# Patient Record
Sex: MALE | Race: BLACK OR AFRICAN AMERICAN | Employment: OTHER | URBAN - METROPOLITAN AREA
[De-identification: names, ages, dates, MRNs, and addresses within clinical notes are randomized per-mention and may not be internally consistent; named-entity substitution may affect disease eponyms.]

---

## 2017-12-03 ENCOUNTER — HOSPITAL ENCOUNTER (EMERGENCY)
Age: 77
Discharge: HOME | End: 2017-12-03
Attending: EMERGENCY MEDICINE
Payer: MEDICARE

## 2017-12-03 VITALS
HEIGHT: 68 IN | SYSTOLIC BLOOD PRESSURE: 158 MMHG | WEIGHT: 194 LBS | HEART RATE: 78 BPM | BODY MASS INDEX: 29.4 KG/M2 | TEMPERATURE: 98.5 F | OXYGEN SATURATION: 94 % | RESPIRATION RATE: 25 BRPM | DIASTOLIC BLOOD PRESSURE: 74 MMHG

## 2017-12-03 DIAGNOSIS — I10 ESSENTIAL HYPERTENSION: Primary | ICD-10-CM

## 2017-12-03 LAB
ANION GAP BLD CALC-SCNC: 18 MMOL/L (ref 5–15)
ATRIAL RATE: 72 BPM
BUN BLD-MCNC: 12 MG/DL (ref 9–20)
CA-I BLD-MCNC: 1.23 MMOL/L (ref 1.12–1.32)
CALCULATED T AXIS, ECG11: 5 DEGREES
CHLORIDE BLD-SCNC: 104 MMOL/L (ref 98–107)
CO2 BLD-SCNC: 25 MMOL/L (ref 21–32)
CREAT BLD-MCNC: 1.2 MG/DL (ref 0.6–1.3)
DIAGNOSIS, 93000: NORMAL
GLUCOSE BLD-MCNC: 121 MG/DL (ref 65–100)
HCT VFR BLD CALC: 46 % (ref 36.6–50.3)
HGB BLD-MCNC: 15.6 GM/DL (ref 12.1–17)
P-R INTERVAL, ECG05: 216 MS
POTASSIUM BLD-SCNC: 3.9 MMOL/L (ref 3.5–5.1)
Q-T INTERVAL, ECG07: 388 MS
QRS DURATION, ECG06: 82 MS
QTC CALCULATION (BEZET), ECG08: 424 MS
SERVICE CMNT-IMP: ABNORMAL
SODIUM BLD-SCNC: 142 MMOL/L (ref 136–145)
TROPONIN I BLD-MCNC: <0.04 NG/ML (ref 0–0.08)
VENTRICULAR RATE, ECG03: 72 BPM

## 2017-12-03 PROCEDURE — 36415 COLL VENOUS BLD VENIPUNCTURE: CPT | Performed by: EMERGENCY MEDICINE

## 2017-12-03 PROCEDURE — 80047 BASIC METABLC PNL IONIZED CA: CPT

## 2017-12-03 PROCEDURE — 93005 ELECTROCARDIOGRAM TRACING: CPT

## 2017-12-03 PROCEDURE — 84484 ASSAY OF TROPONIN QUANT: CPT

## 2017-12-03 PROCEDURE — 99285 EMERGENCY DEPT VISIT HI MDM: CPT

## 2017-12-03 RX ORDER — ATORVASTATIN CALCIUM 10 MG/1
10 TABLET, FILM COATED ORAL DAILY
COMMUNITY
End: 2018-03-19 | Stop reason: SDUPTHER

## 2017-12-03 RX ORDER — METOPROLOL SUCCINATE 25 MG/1
25 TABLET, EXTENDED RELEASE ORAL DAILY
COMMUNITY
End: 2018-02-26 | Stop reason: DRUGHIGH

## 2017-12-03 RX ORDER — SODIUM CHLORIDE 0.9 % (FLUSH) 0.9 %
5-10 SYRINGE (ML) INJECTION AS NEEDED
Status: DISCONTINUED | OUTPATIENT
Start: 2017-12-03 | End: 2017-12-03 | Stop reason: HOSPADM

## 2017-12-03 RX ORDER — HYDRALAZINE HYDROCHLORIDE 20 MG/ML
10 INJECTION INTRAMUSCULAR; INTRAVENOUS
Status: DISCONTINUED | OUTPATIENT
Start: 2017-12-03 | End: 2017-12-03

## 2017-12-03 RX ORDER — SODIUM CHLORIDE 0.9 % (FLUSH) 0.9 %
5-10 SYRINGE (ML) INJECTION EVERY 8 HOURS
Status: DISCONTINUED | OUTPATIENT
Start: 2017-12-03 | End: 2017-12-03 | Stop reason: HOSPADM

## 2017-12-03 RX ORDER — LISINOPRIL 10 MG/1
10 TABLET ORAL DAILY
COMMUNITY
End: 2018-02-26 | Stop reason: DRUGHIGH

## 2017-12-03 RX ADMIN — Medication 10 ML: at 10:30

## 2017-12-03 NOTE — DISCHARGE INSTRUCTIONS
High Blood Pressure: Care Instructions  Your Care Instructions    If your blood pressure is usually above 140/90, you have high blood pressure, or hypertension. That means the top number is 140 or higher or the bottom number is 90 or higher, or both. Despite what a lot of people think, high blood pressure usually doesn't cause headaches or make you feel dizzy or lightheaded. It usually has no symptoms. But it does increase your risk for heart attack, stroke, and kidney or eye damage. The higher your blood pressure, the more your risk increases. Your doctor will give you a goal for your blood pressure. Your goal will be based on your health and your age. An example of a goal is to keep your blood pressure below 140/90. Lifestyle changes, such as eating healthy and being active, are always important to help lower blood pressure. You might also take medicine to reach your blood pressure goal.  Follow-up care is a key part of your treatment and safety. Be sure to make and go to all appointments, and call your doctor if you are having problems. It's also a good idea to know your test results and keep a list of the medicines you take. How can you care for yourself at home? Medical treatment  · If you stop taking your medicine, your blood pressure will go back up. You may take one or more types of medicine to lower your blood pressure. Be safe with medicines. Take your medicine exactly as prescribed. Call your doctor if you think you are having a problem with your medicine. · Talk to your doctor before you start taking aspirin every day. Aspirin can help certain people lower their risk of a heart attack or stroke. But taking aspirin isn't right for everyone, because it can cause serious bleeding. · See your doctor regularly. You may need to see the doctor more often at first or until your blood pressure comes down.   · If you are taking blood pressure medicine, talk to your doctor before you take decongestants or anti-inflammatory medicine, such as ibuprofen. Some of these medicines can raise blood pressure. · Learn how to check your blood pressure at home. Lifestyle changes  · Stay at a healthy weight. This is especially important if you put on weight around the waist. Losing even 10 pounds can help you lower your blood pressure. · If your doctor recommends it, get more exercise. Walking is a good choice. Bit by bit, increase the amount you walk every day. Try for at least 30 minutes on most days of the week. You also may want to swim, bike, or do other activities. · Avoid or limit alcohol. Talk to your doctor about whether you can drink any alcohol. · Try to limit how much sodium you eat to less than 2,300 milligrams (mg) a day. Your doctor may ask you to try to eat less than 1,500 mg a day. · Eat plenty of fruits (such as bananas and oranges), vegetables, legumes, whole grains, and low-fat dairy products. · Lower the amount of saturated fat in your diet. Saturated fat is found in animal products such as milk, cheese, and meat. Limiting these foods may help you lose weight and also lower your risk for heart disease. · Do not smoke. Smoking increases your risk for heart attack and stroke. If you need help quitting, talk to your doctor about stop-smoking programs and medicines. These can increase your chances of quitting for good. When should you call for help? Call 911 anytime you think you may need emergency care. This may mean having symptoms that suggest that your blood pressure is causing a serious heart or blood vessel problem. Your blood pressure may be over 180/110. ? For example, call 911 if:  ? · You have symptoms of a heart attack. These may include:  ¨ Chest pain or pressure, or a strange feeling in the chest.  ¨ Sweating. ¨ Shortness of breath. ¨ Nausea or vomiting.   ¨ Pain, pressure, or a strange feeling in the back, neck, jaw, or upper belly or in one or both shoulders or arms.  ¨ Lightheadedness or sudden weakness. ¨ A fast or irregular heartbeat. ? · You have symptoms of a stroke. These may include:  ¨ Sudden numbness, tingling, weakness, or loss of movement in your face, arm, or leg, especially on only one side of your body. ¨ Sudden vision changes. ¨ Sudden trouble speaking. ¨ Sudden confusion or trouble understanding simple statements. ¨ Sudden problems with walking or balance. ¨ A sudden, severe headache that is different from past headaches. ? · You have severe back or belly pain. ?Do not wait until your blood pressure comes down on its own. Get help right away. ?Call your doctor now or seek immediate care if:  ? · Your blood pressure is much higher than normal (such as 180/110 or higher), but you don't have symptoms. ? · You think high blood pressure is causing symptoms, such as:  ¨ Severe headache. ¨ Blurry vision. ? Watch closely for changes in your health, and be sure to contact your doctor if:  ? · Your blood pressure measures 140/90 or higher at least 2 times. That means the top number is 140 or higher or the bottom number is 90 or higher, or both. ? · You think you may be having side effects from your blood pressure medicine. ? · Your blood pressure is usually normal, but it goes above normal at least 2 times. Where can you learn more? Go to http://prabhjot-willie.info/. Enter J546 in the search box to learn more about \"High Blood Pressure: Care Instructions. \"  Current as of: September 21, 2016  Content Version: 11.4  © 4832-9606 [employer]. Care instructions adapted under license by [employer] (which disclaims liability or warranty for this information). If you have questions about a medical condition or this instruction, always ask your healthcare professional. [place] [address] any warranty or liability for your use of this information.          We hope that we have addressed all of your medical concerns. The examination and treatment you received in the Emergency Department were for an emergent problem and were not intended as complete care. It is important that you follow up with your healthcare provider(s) for ongoing care. If your symptoms worsen or do not improve as expected, and you are unable to reach your usual health care provider(s), you should return to the Emergency Department. Today's healthcare is undergoing tremendous change, and patient satisfaction surveys are one of the many tools to assess the quality of medical care. You may receive a survey from the [place] regarding your experience in the Emergency Department. I hope that your experience has been completely positive, particularly the medical care that I provided. As such, please participate in the survey; anything less than excellent does not meet my expectations or intentions. [facility] and [facility] participate in nationally recognized quality of care measures. If your blood pressure is greater than 120/80, as reported below, we urge that you seek medical care to address the potential of high blood pressure, commonly known as hypertension. Hypertension can be hereditary or can be caused by certain medical conditions, pain, stress, or \"white coat syndrome. \"       Please make an appointment with your health care provider(s) for follow up of your Emergency Department visit. VITALS:   Patient Vitals for the past 8 hrs:   Temp Pulse Resp BP SpO2   12/03/17 1030 - 78 25 158/74 94 %   12/03/17 1008 - - - (!) 184/109 -   12/03/17 1005 98.5 °F (36.9 °C) 91 16 (!) 191/106 98 %          Thank you for allowing us to provide you with medical care today. We realize that you have many choices for your emergency care needs. Please choose us in the future for any continued health care needs. Regards,           Richardson Pendleton, [address] [facility]: [phone]            Recent Results (from the past 24 hour(s))   EKG, 12 LEAD, INITIAL    Collection Time: 12/03/17 10:23 AM   Result Value Ref Range    Ventricular Rate 72 BPM    Atrial Rate 72 BPM    P-R Interval 216 ms    QRS Duration 82 ms    Q-T Interval 388 ms    QTC Calculation (Bezet) 424 ms    Calculated T Axis 5 degrees    Diagnosis       Sinus rhythm with marked sinus arrhythmia with 1st degree AV block  Nonspecific ST abnormality  Abnormal ECG  No previous ECGs available     POC TROPONIN-I    Collection Time: 12/03/17 10:32 AM   Result Value Ref Range    Troponin-I (POC) <0.04 0.00 - 0.08 ng/mL   POC CHEM8    Collection Time: 12/03/17 10:40 AM   Result Value Ref Range    Calcium, ionized (POC) 1.23 1.12 - 1.32 MMOL/L    Sodium (POC) 142 136 - 145 MMOL/L    Potassium (POC) 3.9 3.5 - 5.1 MMOL/L    Chloride (POC) 104 98 - 107 MMOL/L    CO2 (POC) 25 21 - 32 MMOL/L    Anion gap (POC) 18 (H) 5 - 15 mmol/L    Glucose (POC) 121 (H) 65 - 100 MG/DL    BUN (POC) 12 9 - 20 MG/DL    Creatinine (POC) 1.2 0.6 - 1.3 MG/DL    GFRAA, POC >60 >60 ml/min/1.73m2    GFRNA, POC 59 (L) >60 ml/min/1.73m2    Hemoglobin (POC) 15.6 12.1 - 17.0 GM/DL    Hematocrit (POC) 46 36.6 - 50.3 %    Comment Comment Not Indicated. No results found.

## 2017-12-03 NOTE — ED PROVIDER NOTES
HPI Comments: Patient is a very pleasant 27-year-old black male who presents to the emergency department with a chief complaint of hypertension. He states that he had a difficult time sleeping last night, and checked his blood pressure on numerous occasions and found to be running much higher than it normally does. He states that his baseline blood pressures run in the 438-727 systolic range. His pressures since last night has been in the 180-190 range. He states that he just feels generally \"off\", but denies chest pain, headache, weakness, gait instability, shortness of breath. He states his urine output has been normal. He has no other complaints at this time. He states that he has been under somewhat increased stress due to to his son's hospitalization recently, but other than that he identifies no other recent stressors. He denies feeling anxious. He does currently take 3 antihypertensive medications but normally cuts to have them in half because of feelings of lightheadedness and hypotension in the past.    The history is provided by the patient. Past Medical History:   Diagnosis Date    Diverticulitis     High cholesterol     Hypertension     Irregular heart beat        History reviewed. No pertinent surgical history. History reviewed. No pertinent family history. Social History     Social History    Marital status: N/A     Spouse name: N/A    Number of children: N/A    Years of education: N/A     Occupational History    Not on file. Social History Main Topics    Smoking status: Never Smoker    Smokeless tobacco: Never Used    Alcohol use Yes      Comment: socially    Drug use: No    Sexual activity: Not on file     Other Topics Concern    Not on file     Social History Narrative    No narrative on file         ALLERGIES: Review of patient's allergies indicates no known allergies. Review of Systems   Constitutional: Negative.   Negative for appetite change, fever and unexpected weight change. HENT: Negative. Negative for ear pain, hearing loss, nosebleeds, rhinorrhea, sore throat and trouble swallowing. Respiratory: Negative. Negative for cough, chest tightness and shortness of breath. Cardiovascular: Negative. Negative for chest pain and palpitations. Gastrointestinal: Negative. Negative for abdominal distention, abdominal pain, blood in stool and vomiting. Endocrine: Negative. Genitourinary: Negative for dysuria and hematuria. Musculoskeletal: Negative. Negative for back pain and myalgias. Skin: Negative. Negative for rash. Allergic/Immunologic: Negative. Neurological: Negative. Negative for dizziness, syncope, weakness and numbness. Hematological: Negative. Psychiatric/Behavioral: Positive for sleep disturbance. The patient is nervous/anxious. All other systems reviewed and are negative. Vitals:    12/03/17 1005 12/03/17 1008   BP: (!) 191/106 (!) 184/109   Pulse: 91    Resp: 16    Temp: 98.5 °F (36.9 °C)    SpO2: 98%    Weight: 88 kg (194 lb 0.1 oz)    Height: 5' 8\" (1.727 m)             Physical Exam   Constitutional: He is oriented to person, place, and time. He appears well-developed and well-nourished. No distress. HENT:   Head: Normocephalic and atraumatic. Right Ear: External ear normal.   Left Ear: External ear normal.   Nose: Nose normal.   Mouth/Throat: Oropharynx is clear and moist.   Eyes: Conjunctivae and EOM are normal. Pupils are equal, round, and reactive to light. Neck: Normal range of motion. Neck supple. No JVD present. No thyromegaly present. Cardiovascular: Normal rate, normal heart sounds and intact distal pulses. No murmur heard. Irregular     Pulmonary/Chest: Effort normal and breath sounds normal. No respiratory distress. He has no wheezes. He has no rales. Abdominal: Soft. Bowel sounds are normal. He exhibits no distension. There is no tenderness. Musculoskeletal: Normal range of motion.  He exhibits no edema. Neurological: He is alert and oriented to person, place, and time. No cranial nerve deficit. Skin: Skin is warm and dry. No rash noted. Psychiatric: He has a normal mood and affect. His behavior is normal. Thought content normal.   Mildly anxious     Vitals reviewed. MDM  Number of Diagnoses or Management Options  Diagnosis management comments: Assessment and plan: Essential hypertension. No signs of hypertensive emergency at this time. Increase in pressure may be secondary due to recent stress. Patient was not given antihypertensive meds in the  emergency department due to spontaneous lowering of his pressure down to 150's. He feels well and has no complaints. He was provided with reassurance. I have encouraged him to follow up with his primary care physician later this week for recheck. Amount and/or Complexity of Data Reviewed  Clinical lab tests: ordered and reviewed    Risk of Complications, Morbidity, and/or Mortality  Presenting problems: moderate  Diagnostic procedures: moderate  Management options: moderate    Patient Progress  Patient progress: improved    ED Course       Procedures    EKG interpretation: An EKG was performed at 10:23 AM. Rate is 72. It is a normal sinus rhythm with a first degree AV block and a sinus arrhythmia. ST-T segments are unremarkable, there is LVH with a mild strain pattern. There is no evidence of acute injury.

## 2017-12-03 NOTE — ED TRIAGE NOTES
Pt ambulates to treatment area he states that since yesterday afternoon his BP has been high and he has been feeling his heart beating in his chest.  He denies any chest pain, headache, nausea or dizziness with the high BP. He has taken his medication for the blood pressure but he feels it is not helping him today.

## 2017-12-12 ENCOUNTER — HOSPITAL ENCOUNTER (EMERGENCY)
Age: 77
Discharge: HOME | End: 2017-12-12
Attending: EMERGENCY MEDICINE
Payer: MEDICARE

## 2017-12-12 ENCOUNTER — APPOINTMENT (OUTPATIENT)
Dept: CT IMAGING | Age: 77
End: 2017-12-12
Attending: EMERGENCY MEDICINE
Payer: MEDICARE

## 2017-12-12 VITALS
OXYGEN SATURATION: 96 % | HEART RATE: 79 BPM | SYSTOLIC BLOOD PRESSURE: 181 MMHG | DIASTOLIC BLOOD PRESSURE: 90 MMHG | TEMPERATURE: 97.7 F | RESPIRATION RATE: 18 BRPM | BODY MASS INDEX: 29.63 KG/M2 | WEIGHT: 194.89 LBS

## 2017-12-12 DIAGNOSIS — H69.83 EUSTACHIAN TUBE DYSFUNCTION, BILATERAL: ICD-10-CM

## 2017-12-12 DIAGNOSIS — H93.13 TINNITUS OF BOTH EARS: Primary | ICD-10-CM

## 2017-12-12 PROCEDURE — 70450 CT HEAD/BRAIN W/O DYE: CPT

## 2017-12-12 PROCEDURE — 99282 EMERGENCY DEPT VISIT SF MDM: CPT

## 2017-12-12 NOTE — DISCHARGE INSTRUCTIONS
We hope that we have addressed all of your medical concerns. The examination and treatment you received in the Emergency Department were for an emergent problem and were not intended as complete care. It is important that you follow up with your healthcare provider(s) for ongoing care. If your symptoms worsen or do not improve as expected, and you are unable to reach your usual health care provider(s), you should return to the Emergency Department. Today's healthcare is undergoing tremendous change, and patient satisfaction surveys are one of the many tools to assess the quality of medical care. You may receive a survey from the [employer] regarding your experience in the Emergency Department. I hope that your experience has been completely positive, particularly the medical care that I provided. As such, please participate in the survey; anything less than excellent does not meet my expectations or intentions. [facility] and [address] [place] [address] participate in nationally recognized quality of care measures. If your blood pressure is greater than 120/80, as reported below, we urge that you seek medical care to address the potential of high blood pressure, commonly known as hypertension. Hypertension can be hereditary or can be caused by certain medical conditions, pain, stress, or \"white coat syndrome. \"       Please make an appointment with your health care provider(s) for follow up of your Emergency Department visit. VITALS:   Patient Vitals for the past 8 hrs:   Temp Pulse Resp BP SpO2   12/12/17 0204 - - - 153/70 -   12/12/17 0147 97.7 °F (36.5 °C) 85 18 (!) 218/88 95 %          Thank you for allowing us to provide you with medical care today. We realize that you have many choices for your emergency care needs. Please choose us in the future for any continued health care needs. Fredo Capellan, [address]: [phone]            No results found for this or any previous visit (from the past 24 hour(s)). Ct Head Wo Cont    Result Date: 12/12/2017  EXAM:  CT HEAD WO CONT INDICATION: Frontal headache and bilateral tinnitus since 1:00 AM. COMPARISON: None TECHNIQUE: Noncontrast head CT. Coronal and sagittal reformats. CT dose reduction was achieved through the use of a standardized protocol tailored for this examination and automatic exposure control for dose modulation. FINDINGS: The ventricles and sulci are age-appropriate without hydrocephalus. There is no mass effect or midline shift. There is no intracranial hemorrhage or extra-axial fluid collection. Faint wedge-shaped hypoattenuation in the right cerebellar hemisphere. Focal hypoattenuation in the right thalamus. The calvarium is intact. The visualized paranasal sinuses and mastoid air cells are clear. IMPRESSION: 1. No mass or intracranial hemorrhage. 2. 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 Tinnitus: Care Instructions  Your Care Instructions    Many people have some ringing sounds in their ears once in a while. You may hear a roar, a hiss, a tinkle, or a buzz. The sound usually lasts only a few minutes. If it goes on all the time, you may have tinnitus. Tinnitus is usually caused by long-term exposure to loud noise. This damages the nerves in the inner ear. It can occur with all types of hearing loss. It may be a symptom of almost any ear problem. Tinnitus may be caused by a buildup of earwax. Or it may be caused by ear infections or certain medicines (especially antibiotics or large amounts of aspirin). You can also hear noises in your ears because of an injury to the ears, drinking too much alcohol or caffeine, or a medical condition. You may need tests to evaluate your hearing and to find causes of long-lasting tinnitus.   Your doctor may suggest one or more treatments to help you cope with it. You can also do things at home to help reduce symptoms. Follow-up care is a key part of your treatment and safety. Be sure to make and go to all appointments, and call your doctor if you are having problems. It's also a good idea to know your test results and keep a list of the medicines you take. How can you care for yourself at home? · Limit or cut out alcohol and caffeine. They can make your symptoms worse. · Do not smoke or use other tobacco products. Nicotine reduces blood flow to the ear and makes tinnitus worse. If you need help quitting, talk to your doctor about stop-smoking programs and medicines. These can increase your chances of quitting for good. · Talk to your doctor about whether to stop taking aspirin and similar products such as ibuprofen or naproxen. · Get exercise often. It can improve blood flow to the ear. Ways to cope with noise  Some tinnitus may last a long time. To cope with noise, try to:  · Avoid noises that you think caused your tinnitus. If you can't avoid loud noises, wear earplugs or earmuffs. · Ignore the sound by paying attention to other things. · Relax using biofeedback, meditation, or yoga. Feeling stressed and being tired can make tinnitus worse. · Play music or white noise to help you sleep. Background noise may cover up the noise that you hear in your ears. You can buy a machine that makes soothing sounds, such as ocean waves. When should you call for help? Call 911 anytime you think you may need emergency care. For example, call if:  ? · You have symptoms of a stroke. These may include:  ¨ Sudden numbness, tingling, weakness, or loss of movement in your face, arm, or leg, especially on only one side of your body. ¨ Sudden vision changes. ¨ Sudden trouble speaking. ¨ Sudden confusion or trouble understanding simple statements. ¨ Sudden problems with walking or balance. ¨ A sudden, severe headache that is different from past headaches. ?Call your doctor now or seek immediate medical care if:  ? · You develop other symptoms. These may include hearing loss (or worse hearing loss), balance problems, dizziness, nausea, or vomiting. ? Watch closely for changes in your health, and be sure to contact your doctor if:  ? · Your tinnitus moves from both ears to one ear. ? · Your hearing loss gets worse within 1 day after an ear injury. ? · Your tinnitus or hearing loss does not get better within 1 week after an ear injury. ? · Your tinnitus bothers you enough that you want to take medicines to help you cope with it. Where can you learn more? Go to http://prabhjot-willie.info/. Enter S165 in the search box to learn more about \"Tinnitus: Care Instructions. \"  Current as of: May 12, 2017  Content Version: 11.4  © 1154-8079 [employer]. Care instructions adapted under license by [employer] (which disclaims liability or warranty for this information). If you have questions about a medical condition or this instruction, always ask your healthcare professional. [place] [address] any warranty or liability for your use of this information.

## 2017-12-12 NOTE — ED PROVIDER NOTES
HPI Comments: Jose Nance is a 78 y.o. male who presents via private vehicle to the ED with a c/o ringing in his ears. Pt states that it started about 24 hours ago. Pt denies any pain or lightheadedness. Pt also states that he has had some floaters in both eyes. PCP: Barbi Deleon  PMHx significant for: HTN, hypercholesterolemia, diverticulitis  PSHx significant for: none  Social Hx: Tobacco: denies EtOH: yes Illicit drug use: denies    There are no further complaints or symptoms at this time. Patient is a 78 y.o. male presenting with tinnitus. The history is provided by the patient. No  was used. Ringing in Ear    This is a new problem. The current episode started 12 to 24 hours ago. The problem occurs constantly. The problem has not changed since onset. Patient complains that both ears are affected. There has been no fever. The patient is experiencing no pain. Pertinent negatives include no headaches, no hearing loss, no rhinorrhea, no abdominal pain, no vomiting, no cough and no rash. Past Medical History:   Diagnosis Date    Diverticulitis     High cholesterol     Hypertension     Irregular heart beat        History reviewed. No pertinent surgical history. History reviewed. No pertinent family history. Social History     Social History    Marital status: SINGLE     Spouse name: N/A    Number of children: N/A    Years of education: N/A     Occupational History    Not on file. Social History Main Topics    Smoking status: Never Smoker    Smokeless tobacco: Never Used    Alcohol use Yes      Comment: socially    Drug use: No    Sexual activity: Not on file     Other Topics Concern    Not on file     Social History Narrative         ALLERGIES: Review of patient's allergies indicates no known allergies. Review of Systems   Constitutional: Negative for appetite change, chills, fever and unexpected weight change. HENT: Positive for tinnitus. Negative for ear pain, hearing loss, rhinorrhea and trouble swallowing. Eyes: Negative for pain and visual disturbance. Respiratory: Negative for cough, chest tightness and shortness of breath. Cardiovascular: Negative for chest pain and palpitations. Gastrointestinal: Negative for abdominal distention, abdominal pain, blood in stool and vomiting. Genitourinary: Negative for dysuria, hematuria and urgency. Musculoskeletal: Negative for back pain and myalgias. Skin: Negative for rash. Neurological: Negative for dizziness, syncope, weakness, numbness and headaches. Psychiatric/Behavioral: Negative for confusion and suicidal ideas. All other systems reviewed and are negative. Vitals:    12/12/17 0147 12/12/17 0204   BP: (!) 218/88 153/70   Pulse: 85    Resp: 18    Temp: 97.7 °F (36.5 °C)    SpO2: 95%    Weight: 88.4 kg (194 lb 14.2 oz)             Physical Exam   Constitutional: He is oriented to person, place, and time. He appears well-developed and well-nourished. No distress. HENT:   Head: Normocephalic and atraumatic. Right Ear: Hearing, external ear and ear canal normal. Tympanic membrane is retracted. Left Ear: Hearing, external ear and ear canal normal. Tympanic membrane is retracted. Nose: Nose normal.   Mouth/Throat: Uvula is midline. No uvula swelling. Posterior oropharyngeal erythema present. No oropharyngeal exudate, posterior oropharyngeal edema or tonsillar abscesses. Eyes: Conjunctivae and EOM are normal. Pupils are equal, round, and reactive to light. Right eye exhibits no discharge. Left eye exhibits no discharge. No scleral icterus. Neck: Normal range of motion. Neck supple. No JVD present. No tracheal deviation present. Cardiovascular: Normal rate, regular rhythm, normal heart sounds and intact distal pulses. Exam reveals no gallop and no friction rub. No murmur heard. Pulmonary/Chest: Effort normal and breath sounds normal. No stridor.  No respiratory distress. He has no decreased breath sounds. He has no wheezes. He has no rhonchi. He has no rales. He exhibits no tenderness. Abdominal: Soft. Bowel sounds are normal. He exhibits no distension. There is no tenderness. There is no rebound and no guarding. Musculoskeletal: Normal range of motion. He exhibits no edema or tenderness. Neurological: He is alert and oriented to person, place, and time. He has normal strength and normal reflexes. No cranial nerve deficit or sensory deficit. He exhibits normal muscle tone. Coordination normal. GCS eye subscore is 4. GCS verbal subscore is 5. GCS motor subscore is 6. Skin: Skin is warm and dry. No rash noted. He is not diaphoretic. No erythema. No pallor. Psychiatric: He has a normal mood and affect. His behavior is normal. Judgment and thought content normal.   Nursing note and vitals reviewed. MDM  Number of Diagnoses or Management Options  Eustachian tube dysfunction, bilateral:   Tinnitus of both ears:      Amount and/or Complexity of Data Reviewed  Tests in the radiology section of CPT®: ordered and reviewed    Risk of Complications, Morbidity, and/or Mortality  Presenting problems: moderate  Diagnostic procedures: moderate  Management options: moderate    Patient Progress  Patient progress: stable    ED Course       Procedures  Chief Complaint   Patient presents with   Jerelene [facility] in Ear       The patients presenting problems have been discussed, and they are in agreement with the care plan formulated and outlined with them. I have encouraged them to ask questions as they arise throughout their visit. MEDICATIONS GIVEN:  Medications - No data to display    LABS REVIEWED:  No results found for this or any previous visit (from the past 24 hour(s)).     VITAL SIGNS:  Patient Vitals for the past 12 hrs:   Temp Pulse Resp BP SpO2   12/12/17 0330 - 79 18 181/90 96 %   12/12/17 0204 - - - 153/70 -   12/12/17 0147 97.7 °F (36.5 °C) 85 18 (!) 218/88 95 % RADIOLOGY RESULTS:  The following have been ordered and reviewed:  CT HEAD WO CONT   Final Result        Study Result      EXAM:  CT HEAD WO CONT     INDICATION: Frontal headache and bilateral tinnitus since 1:00 AM.     COMPARISON: None     TECHNIQUE: Noncontrast head CT. Coronal and sagittal reformats. CT dose  reduction was achieved through the use of a standardized protocol tailored for  this examination and automatic exposure control for dose modulation.     FINDINGS: The ventricles and sulci are age-appropriate without hydrocephalus. There is no mass effect or midline shift. There is no intracranial hemorrhage or  extra-axial fluid collection. Faint wedge-shaped hypoattenuation in the right  cerebellar hemisphere. Focal hypoattenuation in the right thalamus.     The calvarium is intact. The visualized paranasal sinuses and mastoid air cells  are clear.     IMPRESSION  IMPRESSION:         1. No mass or intracranial hemorrhage. 2. 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 PROGRESS NOTES:  Pt feels better. Discussed results and plan with patient. Discussed OTC treatments for eustachian tube dysfunction. Patient will be discharged home with PCP and ENT followup. Patient instructed to return to the emergency room for any worsening symptoms or any other concerns. DIAGNOSIS:    1. Tinnitus of both ears    2.  Eustachian tube dysfunction, bilateral        PLAN:  Follow-up Information     Follow up With Details Comments Contact Info    Charleen Liu MD Schedule an appointment as soon as possible for a visit  [address] [facility] [phone]      Drake Morel MD Call in 1 week As needed [address] [place] [address],[address]      [address] [facility] DEPT  If symptoms worsen [facility] 14  [phone]        Discharge Medication List as of 12/12/2017  3:27 AM      CONTINUE these medications which have NOT CHANGED    Details   lisinopril (PRINIVIL, ZESTRIL) 10 mg tablet Take 10 mg by mouth daily. , Historical Med      atorvastatin (LIPITOR) 10 mg tablet Take 10 mg by mouth daily. , Historical Med      metoprolol succinate (TOPROL-XL) 25 mg XL tablet Take 25 mg by mouth daily. , Historical Med               ED COURSE: The patients hospital course has been uncomplicated.

## 2018-01-10 ENCOUNTER — HOSPITAL ENCOUNTER (EMERGENCY)
Age: 78
Discharge: HOME | End: 2018-01-10
Attending: EMERGENCY MEDICINE
Payer: MEDICARE

## 2018-01-10 VITALS
BODY MASS INDEX: 28.79 KG/M2 | HEART RATE: 74 BPM | RESPIRATION RATE: 14 BRPM | SYSTOLIC BLOOD PRESSURE: 188 MMHG | HEIGHT: 68 IN | OXYGEN SATURATION: 100 % | DIASTOLIC BLOOD PRESSURE: 88 MMHG | WEIGHT: 190 LBS | TEMPERATURE: 97.8 F

## 2018-01-10 DIAGNOSIS — H93.13 TINNITUS OF BOTH EARS: Primary | ICD-10-CM

## 2018-01-10 PROCEDURE — 99283 EMERGENCY DEPT VISIT LOW MDM: CPT

## 2018-01-10 RX ORDER — ALPRAZOLAM 0.25 MG/1
0.25 TABLET ORAL
Qty: 12 TAB | Refills: 0 | Status: SHIPPED | OUTPATIENT
Start: 2018-01-10 | End: 2018-06-20 | Stop reason: ALTCHOICE

## 2018-01-10 NOTE — DISCHARGE INSTRUCTIONS
Tinnitus: Care Instructions  Your Care Instructions    Many people have some ringing sounds in their ears once in a while. You may hear a roar, a hiss, a tinkle, or a buzz. The sound usually lasts only a few minutes. If it goes on all the time, you may have tinnitus. Tinnitus is usually caused by long-term exposure to loud noise. This damages the nerves in the inner ear. It can occur with all types of hearing loss. It may be a symptom of almost any ear problem. Tinnitus may be caused by a buildup of earwax. Or it may be caused by ear infections or certain medicines (especially antibiotics or large amounts of aspirin). You can also hear noises in your ears because of an injury to the ears, drinking too much alcohol or caffeine, or a medical condition. You may need tests to evaluate your hearing and to find causes of long-lasting tinnitus. Your doctor may suggest one or more treatments to help you cope with it. You can also do things at home to help reduce symptoms. Follow-up care is a key part of your treatment and safety. Be sure to make and go to all appointments, and call your doctor if you are having problems. It's also a good idea to know your test results and keep a list of the medicines you take. How can you care for yourself at home? · Limit or cut out alcohol and caffeine. They can make your symptoms worse. · Do not smoke or use other tobacco products. Nicotine reduces blood flow to the ear and makes tinnitus worse. If you need help quitting, talk to your doctor about stop-smoking programs and medicines. These can increase your chances of quitting for good. · Talk to your doctor about whether to stop taking aspirin and similar products such as ibuprofen or naproxen. · Get exercise often. It can improve blood flow to the ear. Ways to cope with noise  Some tinnitus may last a long time. To cope with noise, try to:  · Avoid noises that you think caused your tinnitus.  If you can't avoid loud noises, wear earplugs or earmuffs. · Ignore the sound by paying attention to other things. · Relax using biofeedback, meditation, or yoga. Feeling stressed and being tired can make tinnitus worse. · Play music or white noise to help you sleep. Background noise may cover up the noise that you hear in your ears. You can buy a machine that makes soothing sounds, such as ocean waves. When should you call for help? Call 911 anytime you think you may need emergency care. For example, call if:  ? · You have symptoms of a stroke. These may include:  ¨ Sudden numbness, tingling, weakness, or loss of movement in your face, arm, or leg, especially on only one side of your body. ¨ Sudden vision changes. ¨ Sudden trouble speaking. ¨ Sudden confusion or trouble understanding simple statements. ¨ Sudden problems with walking or balance. ¨ A sudden, severe headache that is different from past headaches. ?Call your doctor now or seek immediate medical care if:  ? · You develop other symptoms. These may include hearing loss (or worse hearing loss), balance problems, dizziness, nausea, or vomiting. ? Watch closely for changes in your health, and be sure to contact your doctor if:  ? · Your tinnitus moves from both ears to one ear. ? · Your hearing loss gets worse within 1 day after an ear injury. ? · Your tinnitus or hearing loss does not get better within 1 week after an ear injury. ? · Your tinnitus bothers you enough that you want to take medicines to help you cope with it. Where can you learn more? Go to http://prabhjot-willie.info/. Enter S165 in the search box to learn more about \"Tinnitus: Care Instructions. \"  Current as of: May 12, 2017  Content Version: 11.4  © 2452-4689 [employer]. Care instructions adapted under license by [employer] (which disclaims liability or warranty for this information).  If you have questions about a medical condition or this instruction, always ask your healthcare professional. [place] [address] any warranty or liability for your use of this information. We hope that we have addressed all of your medical concerns. The examination and treatment you received in the Emergency Department were for an emergent problem and were not intended as complete care. It is important that you follow up with your healthcare provider(s) for ongoing care. If your symptoms worsen or do not improve as expected, and you are unable to reach your usual health care provider(s), you should return to the Emergency Department. Today's healthcare is undergoing tremendous change, and patient satisfaction surveys are one of the many tools to assess the quality of medical care. You may receive a survey from the [facility] regarding your experience in the Emergency Department. I hope that your experience has been completely positive, particularly the medical care that I provided. As such, please participate in the survey; anything less than excellent does not meet my expectations or intentions. [facility] and [address] [place] [address] participate in nationally recognized quality of care measures. If your blood pressure is greater than 120/80, as reported below, we urge that you seek medical care to address the potential of high blood pressure, commonly known as hypertension. Hypertension can be hereditary or can be caused by certain medical conditions, pain, stress, or \"white coat syndrome. \"       Please make an appointment with your health care provider(s) for follow up of your Emergency Department visit. VITALS:   Patient Vitals for the past 8 hrs:   Temp Pulse Resp BP SpO2   01/10/18 1014 97.8 °F (36.6 °C) 74 14 188/88 100 %          Thank you for allowing us to provide you with medical care today. We realize that you have many choices for your emergency care needs.   Please choose us in the future for any continued health care needs. Felipa Me Alferd Bumpers, [address]: [phone]            No results found for this or any previous visit (from the past 24 hour(s)). No results found.

## 2018-01-10 NOTE — ED PROVIDER NOTES
Patient is a 78 y.o. male presenting with tinnitus and headaches. Ringing in Ear    Associated symptoms include headaches. Headache       The patient is a 79-year-old who presents to the emergency room with acute onset of one and a half months of ringing in the ear associated with a very mild headache and some stiffness in his upper back. He was seen here previously for this and had a negative CT scan per his history he states that the ringing has gotten much worse in the last few days and he has an appointment to see ENT on January 26 but he states it is difficult for him to sleep due to the ringing in the ears. Past Medical History:   Diagnosis Date    Diverticulitis     High cholesterol     Hypertension     Irregular heart beat        History reviewed. No pertinent surgical history. History reviewed. No pertinent family history. Social History     Social History    Marital status: SINGLE     Spouse name: N/A    Number of children: N/A    Years of education: N/A     Occupational History    Not on file. Social History Main Topics    Smoking status: Never Smoker    Smokeless tobacco: Never Used    Alcohol use Yes      Comment: socially    Drug use: No    Sexual activity: Not on file     Other Topics Concern    Not on file     Social History Narrative         ALLERGIES: Review of patient's allergies indicates no known allergies. Review of Systems   HENT: Positive for tinnitus. Neurological: Positive for headaches. Vitals:    01/10/18 1014   BP: 188/88   Pulse: 74   Resp: 14   Temp: 97.8 °F (36.6 °C)   SpO2: 100%   Weight: 86.2 kg (190 lb)   Height: 5' 8\" (1.727 m)            Physical Exam   Constitutional: He is oriented to person, place, and time. He appears well-developed and well-nourished. HENT:   Head: Normocephalic and atraumatic. Mouth/Throat: Oropharynx is clear and moist. No oropharyngeal exudate. Eyes: Conjunctivae are normal. No scleral icterus.    Neck: Neck supple. No thyromegaly present. Cardiovascular: Normal rate, regular rhythm and normal heart sounds. Exam reveals no gallop and no friction rub. No murmur heard. Pulmonary/Chest: Effort normal and breath sounds normal. No stridor. No respiratory distress. He has no wheezes. He has no rales. Abdominal: Soft. Bowel sounds are normal. There is no tenderness. There is no rebound and no guarding. Musculoskeletal: Normal range of motion. Lymphadenopathy:     He has no cervical adenopathy. Neurological: He is alert and oriented to person, place, and time. Skin: Skin is warm and dry. Psychiatric: He has a normal mood and affect.         [facility]  ED Course       Procedures

## 2018-01-10 NOTE — ED NOTES
The patient was discharged home by Dr. Juan Miguel Sandy in stable condition. The patient is alert and oriented, in no respiratory distress. The patient's diagnosis, condition and treatment were explained. The patient expressed understanding. One prescriptions given. No work/school note given. A discharge plan has been developed. A  was not involved in the process. Aftercare instructions were given. Pt ambulatory out of the ED.

## 2018-01-25 ENCOUNTER — OFFICE VISIT (OUTPATIENT)
Dept: FAMILY MEDICINE CLINIC | Age: 78
End: 2018-01-25

## 2018-01-25 DIAGNOSIS — Z76.89 ENCOUNTER TO ESTABLISH CARE: Primary | ICD-10-CM

## 2018-01-30 ENCOUNTER — OFFICE VISIT (OUTPATIENT)
Dept: FAMILY MEDICINE CLINIC | Age: 78
End: 2018-01-30

## 2018-01-30 VITALS
HEIGHT: 68 IN | DIASTOLIC BLOOD PRESSURE: 90 MMHG | SYSTOLIC BLOOD PRESSURE: 160 MMHG | WEIGHT: 195 LBS | OXYGEN SATURATION: 100 % | HEART RATE: 70 BPM | RESPIRATION RATE: 16 BRPM | TEMPERATURE: 98.2 F | BODY MASS INDEX: 29.55 KG/M2

## 2018-01-30 DIAGNOSIS — F41.9 ANXIETY: ICD-10-CM

## 2018-01-30 DIAGNOSIS — G47.09 OTHER INSOMNIA: ICD-10-CM

## 2018-01-30 DIAGNOSIS — I10 UNCONTROLLED HYPERTENSION: ICD-10-CM

## 2018-01-30 DIAGNOSIS — R53.83 MALAISE AND FATIGUE: ICD-10-CM

## 2018-01-30 DIAGNOSIS — I10 ESSENTIAL HYPERTENSION: ICD-10-CM

## 2018-01-30 DIAGNOSIS — E55.9 VITAMIN D DEFICIENCY: ICD-10-CM

## 2018-01-30 DIAGNOSIS — M25.511 CHRONIC PAIN OF BOTH SHOULDERS: ICD-10-CM

## 2018-01-30 DIAGNOSIS — R53.81 MALAISE AND FATIGUE: ICD-10-CM

## 2018-01-30 DIAGNOSIS — H93.12 TINNITUS OF LEFT EAR: Primary | ICD-10-CM

## 2018-01-30 DIAGNOSIS — M25.512 CHRONIC PAIN OF BOTH SHOULDERS: ICD-10-CM

## 2018-01-30 DIAGNOSIS — I49.9 IRREGULAR HEART RATE: ICD-10-CM

## 2018-01-30 DIAGNOSIS — E78.00 PURE HYPERCHOLESTEROLEMIA: ICD-10-CM

## 2018-01-30 DIAGNOSIS — G89.29 CHRONIC PAIN OF BOTH SHOULDERS: ICD-10-CM

## 2018-01-30 RX ORDER — METOPROLOL SUCCINATE 25 MG/1
25 TABLET, EXTENDED RELEASE ORAL
Refills: 0 | COMMUNITY
Start: 2017-12-27 | End: 2018-02-26 | Stop reason: SDUPTHER

## 2018-01-30 RX ORDER — ALPRAZOLAM 0.25 MG/1
0.25 TABLET ORAL
Refills: 0 | COMMUNITY
Start: 2018-01-10 | End: 2018-01-30 | Stop reason: SDUPTHER

## 2018-01-30 RX ORDER — LISINOPRIL 10 MG/1
10 TABLET ORAL
Refills: 0 | COMMUNITY
Start: 2018-01-16 | End: 2018-02-26 | Stop reason: SDUPTHER

## 2018-01-30 RX ORDER — FLUTICASONE PROPIONATE 50 MCG
2 SPRAY, SUSPENSION (ML) NASAL
Refills: 1 | COMMUNITY
Start: 2018-01-04 | End: 2018-02-26 | Stop reason: ALTCHOICE

## 2018-01-30 RX ORDER — ATORVASTATIN CALCIUM 10 MG/1
10 TABLET, FILM COATED ORAL
COMMUNITY
Start: 2018-01-29 | End: 2018-02-26 | Stop reason: SDUPTHER

## 2018-01-30 RX ORDER — LOSARTAN POTASSIUM 50 MG/1
50 TABLET ORAL DAILY
Qty: 30 TAB | Refills: 5 | Status: SHIPPED | OUTPATIENT
Start: 2018-01-30 | End: 2018-02-07

## 2018-01-30 RX ORDER — ALPRAZOLAM 0.25 MG/1
0.25 TABLET ORAL
Qty: 12 TAB | Refills: 0 | Status: SHIPPED | OUTPATIENT
Start: 2018-01-30 | End: 2018-02-26 | Stop reason: SDUPTHER

## 2018-01-30 NOTE — MR AVS SNAPSHOT
[address] [place], [place] [address] [address] [facility] 
[phone] Patient: Murali Beckwith MRN: [id number] OES:[id number] Visit Information Date & Time Provider Department Dept. Phone Encounter #  
 1/30/2018 10:00 AM Quinten Ochoa [phone] [id number] Upcoming Health Maintenance Date Due DTaP/Tdap/Td series (1 - Tdap) 12/24/1963 ZOSTER VACCINE AGE 60> 10/24/2002 GLAUCOMA SCREENING Q2Y 12/24/2007 Pneumococcal 65+ Low/Medium Risk (1 of 2 - PCV13) 12/24/2007 MEDICARE YEARLY EXAM 12/24/2007 Influenza Age 5 to Adult 8/1/2017 Allergies as of 1/30/2018  Review Complete On: 1/30/2018 By: Maryjane Lay MD  
 No Known Allergies Current Immunizations  Never Reviewed No immunizations on file. Not reviewed this visit You Were Diagnosed With   
  
 Codes Comments Tinnitus of left ear    -  Primary ICD-10-CM: H93.12 
ICD-9-CM: 388.30 Essential hypertension     ICD-10-CM: I10 
ICD-9-CM: 401.9 Pure hypercholesterolemia     ICD-10-CM: E78.00 ICD-9-CM: 272.0 Chronic pain of both shoulders     ICD-10-CM: M25.511, G89.29, M25.512 ICD-9-CM: 719.41, 338.29 Malaise and fatigue     ICD-10-CM: R53.81, R53.83 ICD-9-CM: 780.79 Other insomnia     ICD-10-CM: G47.09 
ICD-9-CM: 780.52 Vitamin D deficiency     ICD-10-CM: E55.9 ICD-9-CM: 268.9 Irregular heart rate     ICD-10-CM: I49.9 ICD-9-CM: 427.9 Uncontrolled hypertension     ICD-10-CM: I10 
ICD-9-CM: 401.9 Anxiety     ICD-10-CM: F41.9 ICD-9-CM: 300.00 Vitals BP Pulse Temp Resp Height(growth percentile) Weight(growth percentile) 160/90 (BP 1 Location: Right arm, BP Patient Position: Sitting) 70 98.2 °F (36.8 °C) (Oral) 16 5' 8\" (1.727 m) 195 lb (88.5 kg) SpO2 BMI Smoking Status 100% 29.65 kg/m2 Never Smoker Vitals History BMI and BSA Data Body Mass Index Body Surface Area  
 29.65 kg/m 2 2.06 m 2 Preferred Pharmacy Pharmacy Name Phone [facility][id number] [place], [address] [phone] Your Updated Medication List  
  
   
This list is accurate as of: 1/30/18 11:52 AM.  Always use your most recent med list.  
  
  
  
  
 ALPRAZolam 0.25 mg tablet Commonly known as:  Meri Marking Take 1 Tab by mouth every eight (8) hours as needed. Max Daily Amount: 0.75 mg.  
  
 atorvastatin 10 mg tablet Commonly known as:  LIPITOR Take 10 mg by mouth nightly. fluticasone 50 mcg/actuation nasal spray Commonly known as:  Angy Dillon 2 Sprays by Both Nostrils route once over twenty-four (24) hours. LIPOFLAVONOID PO Take 1 Tab by mouth once over twenty-four (24) hours. lisinopril 10 mg tablet Commonly known as:  Ara Cage Take 10 mg by mouth once over twenty-four (24) hours. losartan 50 mg tablet Commonly known as:  COZAAR Take 1 Tab by mouth daily for 30 days. metoprolol succinate 25 mg XL tablet Commonly known as:  TOPROL-XL Take 25 mg by mouth once over twenty-four (24) hours. multivitamin, tx-iron-ca-min 9 mg iron-400 mcg Tab tablet Commonly known as:  THERA-M w/ IRON Take 1 Tab by mouth daily. Prescriptions Printed Refills ALPRAZolam (XANAX) 0.25 mg tablet 0 Sig: Take 1 Tab by mouth every eight (8) hours as needed. Max Daily Amount: 0.75 mg. Class: Print Route: Oral  
  
Prescriptions Sent to Pharmacy Refills  
 losartan (COZAAR) 50 mg tablet 5 Sig: Take 1 Tab by mouth daily for 30 days. Class: Normal  
 Pharmacy: [facility]/pharmacy [id number], [address] Ph #: [phone] Route: Oral  
  
We Performed the Following AMB POC EKG ROUTINE W/ 12 LEADS, INTER & REP [74110 CPT(R)] CBC WITH AUTOMATED DIFF [62402 CPT(R)] CRP, HIGH SENSITIVITY [33464 CPT(R)] LIPID PANEL [42961 CPT(R)] METABOLIC PANEL, COMPREHENSIVE [49808 CPT(R)] REFERRAL TO CARDIOLOGY [RXT18 Custom] Comments:  
 77[facility] as [facility] today. He has had a long history of Tinnitus and recnelty over the past month, things have progressed with feeling very anxious, not sleeping well at night and also noted today on exam to have a very irregular heart rate. I note that the patient has seen ENT and had a previous provider and saw that person about 1-month ago. He recalls that when he was in the ER sometime ago, that another provider may have asked about possible A.fib, but he has never had a work up. Today, his EKG is suspicious for A.fib. He has been on metoprolol. I have sent for labs and he may benefit from further cardiology work up. I note that his EKG also seems to indicate a possible old infarct, but this could be because of the A.fib. Hopefully, by the time he sees Dr. Vani Manriquez, his labs will be back. I note that he had been on Lisinopril low dose, but it did not seem to help his BP and I wonder about possible side effects. Changed to Cozaar and he is also on XR Metoprolol. He has never seen a cardiologist.  
 [address] [facility] [place] [[id number] Custom] VITAMIN B12 & FOLATE [28556 CPT(R)] VITAMIN D, 25 HYDROXY [id number] CPT(R)] Referral Information Referral ID Referred By Referred To  
  
 [id number] Beto Carlisle MD   
   [facility] [address] Phone: [phone] Fax: [phone] Visits Status Start Date End Date 1 New Request 1/30/18 1/30/19 If your referral has a status of pending review or denied, additional information will be sent to support the outcome of this decision. Introducing [facility]! [facility] introduces [employer] patient portal. Now you can access parts of your medical record, email your doctor's office, and request medication refills online. 1. In your internet browser, go to [email or web address] 2. Click on the First Time User? Click Here link in the Sign In box. You will see the New Member Sign Up page. 3. Enter your PlaySquare Access Code exactly as it appears below. You will not need to use this code after youve completed the sign-up process. If you do not sign up before the expiration date, you must request a new code. · PlayS[employer] Access Code: [email or web address] Expires: 4/30/2018 11:38 AM 
 
4. Enter the last four digits of your Social Security Number (xxxx) and Date of Birth (mm/dd/yyyy) as indicated and click Submit. You will be taken to the next sign-up page. 5. Create a PoweredAnalyticst ID. This will be your PlaySquare login ID and cannot be changed, so think of one that is secure and easy to remember. 6. Create a PlaySquare password. You can change your password at any time. 7. Enter your Password Reset Question and Answer. This can be used at a later time if you forget your password. 8. Enter your e-mail address. You will receive e-mail notification when new information is available in [address] 9. Click Sign Up. You can now view and download portions of your medical record. 10. Click the Download Summary menu link to download a portable copy of your medical information. If you have questions, please visit the Frequently Asked Questions section of the Play[employer] website. Remember, PlaySquare is NOT to be used for urgent needs. For medical emergencies, dial 911. Now available from your iPhone and Android! Please provide this summary of care documentation to your next provider. Your primary care clinician is listed as [address]. If you have any questions after today's visit, please call [phone].

## 2018-01-30 NOTE — PROGRESS NOTES
Identified pt with two pt identifiers(name and ). Chief Complaint   Patient presents with    New Patient     Establish as NP - transferring from past PCP, Dr. Trena Anderson as this office is closer    Hypertension     BP fluctuates - did not take BP medication today    Ringing in Ear     has been seen by ENT and dx with Tinnitis    Labs     has had coffee with sweetener    Sleep Problem     experiencing trouble sleeping he believes d/t BP        Health Maintenance Due   Topic    DTaP/Tdap/Td series (1 - Tdap)    ZOSTER VACCINE AGE 60>     GLAUCOMA SCREENING Q2Y     Pneumococcal 65+ Low/Medium Risk (1 of 2 - PCV13)    MEDICARE YEARLY EXAM     Influenza Age 5 to Adult        Wt Readings from Last 3 Encounters:   18 195 lb (88.5 kg)     Temp Readings from Last 3 Encounters:   18 98.2 °F (36.8 °C) (Oral)     BP Readings from Last 3 Encounters:   18 160/90     Pulse Readings from Last 3 Encounters:   18 70         Learning Assessment:  :     Learning Assessment 2018   PRIMARY LEARNER Patient   BARRIERS PRIMARY LEARNER VISUAL   CO-LEARNER CAREGIVER No   PRIMARY LANGUAGE ENGLISH   LEARNER PREFERENCE PRIMARY DEMONSTRATION     LISTENING     READING   ANSWERED BY patient   RELATIONSHIP SELF       Depression Screening:  :     PHQ over the last two weeks 2018   Little interest or pleasure in doing things Not at all   Feeling down, depressed or hopeless Not at all   Total Score PHQ 2 0       Fall Risk Assessment:  :     Fall Risk Assessment, last 12 mths 2018   Able to walk? Yes   Fall in past 12 months? No       Abuse Screening:  :     Abuse Screening Questionnaire 2018   Do you ever feel afraid of your partner? N   Are you in a relationship with someone who physically or mentally threatens you? N   Is it safe for you to go home? Y           Coordination of Care Questionnaire:  :     1) Have you been to an emergency room, urgent care clinic since your last visit? Yes, seen in the ER beginning of this month for ringing in ears   Hospitalized since your last visit? no             2) Have you seen or consulted any other health care providers outside of [address] [place] [address] since your last visit? no  (Include any pap smears or colon screenings in this section.)    3) Do you have an Advance Directive on file? no  Are you interested in receiving information about Advance Directives? no    Patient is accompanied by self. Reviewed record in preparation for visit and have obtained necessary documentation. Medication reconciliation up to date and corrected with patient at this time.

## 2018-01-30 NOTE — PROGRESS NOTES
CHIEF COMPLAINT:   Chief Complaint   Patient presents with    New Patient     Establish as NP - transferring from past PCP, Dr. Lázaro Shaw as this office is closer    Hypertension     BP fluctuates - did not take BP medication today    Ringing in Ear     has been seen by ENT and dx with Tinnitis    Labs     has had coffee with sweetener    Sleep Problem     experiencing trouble sleeping he believes d/t BP       HISTORY OF PRESENT ILLNESS:   77M who is new to our practice. He is here to establish care, but also has exp      PAST MEDICAL HISTORY:  Past Medical History:   Diagnosis Date    Hyperlipidemia     Hypertension     Tinnitus          PAST SURGICAL HISTORY:  History reviewed. No pertinent surgical history. MEDICATIONS:  Current Outpatient Prescriptions   Medication Sig Dispense Refill    lisinopril (PRINIVIL, ZESTRIL) 10 mg tablet Take 10 mg by mouth once over twenty-four (24) hours. 0    ALPRAZolam (XANAX) 0.25 mg tablet Take 0.25 mg by mouth every eight (8) hours as needed. 0    fluticasone (FLONASE) 50 mcg/actuation nasal spray 2 Sprays by Both Nostrils route once over twenty-four (24) hours. 1    metoprolol succinate (TOPROL-XL) 25 mg XL tablet Take 25 mg by mouth once over twenty-four (24) hours. 0    atorvastatin (LIPITOR) 10 mg tablet Take 10 mg by mouth nightly.  multivitamin, tx-iron-ca-min (THERA-M W/ IRON) 9 mg iron-400 mcg tab tablet Take 1 Tab by mouth daily.  BIOFLAV,LEMON/VIT BCOMP,C (LIPOFLAVONOID PO) Take 1 Tab by mouth once over twenty-four (24) hours.              ALLERGIES:  No Known Allergies       SOCIAL HISTORY:     Social History    Marital status: SINGLE     Spouse name: N/A    Number of children: 1 son    Years of education: 11th grade     Occupational History    Retired      Social History Main Topics    Smoking status: Never Smoker    Smokeless tobacco: Never Used    Alcohol use No    Drug use: No    Sexual activity: No FAMILY HISTORY:   Family History   Problem Relation Age of Onset    Hypertension Mother     Stroke Father     Diabetes Sister     Hypertension Sister        REVIEW OF SYSTEMS:  Review of Systems - Negative except for documented in the HPI. PHYSICAL EXAMINATION:      LABORATORY DATA:  Pending./ordered      IMPRESSION AND PLAN:     ICD-10-CM ICD-9-CM    1. Tinnitus of left ear H93.12 388.30 REFERRAL TO CARDIOLOGY   2. Essential hypertension I10 401.9 losartan (COZAAR) 50 mg tablet      METABOLIC PANEL, COMPREHENSIVE   3. Pure hypercholesterolemia Y72.46 629.2 METABOLIC PANEL, COMPREHENSIVE      REFERRAL TO CARDIOLOGY   4. Chronic pain of both shoulders M25.511 719.41 LIPID PANEL    G89.29 338.29 CRP, HIGH SENSITIVITY    M25.512     5. Malaise and fatigue R53.81 780.79 THYROID CASCADE PROFILE    R53.83  CBC WITH AUTOMATED DIFF      VITAMIN B12 & FOLATE   6. Other insomnia G47.09 780.52 REFERRAL TO CARDIOLOGY   7. Vitamin D deficiency E55.9 268.9 VITAMIN D, 25 HYDROXY   8. Irregular heart rate I49.9 427.9 AMB POC EKG ROUTINE W/ 12 LEADS, INTER & REP      REFERRAL TO CARDIOLOGY      ALPRAZolam (XANAX) 0.25 mg tablet   9. Uncontrolled hypertension I10 401.9 REFERRAL TO CARDIOLOGY   10. Anxiety F41.9 300.00 ALPRAZolam (XANAX) 0.25 mg tablet     I have discussed the diagnosis with the patient and the intended treatment plan as seen in the above orders. The patient has received an after-visit summary and questions were answered concerning future plans. Asked to return should symptoms worsen or not improve with treatment. Any pending labs and studies will be relayed to patient when they become available. Pt verbalizes understanding of plan of care and denies further questions or concerns at this time. Follow-up Disposition:  Return if symptoms worsen or fail to improve.

## 2018-01-31 LAB
25(OH)D3+25(OH)D2 SERPL-MCNC: 46.6 NG/ML (ref 30–100)
ALBUMIN SERPL-MCNC: 4.3 G/DL (ref 3.5–4.8)
ALBUMIN/GLOB SERPL: 1.5 {RATIO} (ref 1.2–2.2)
ALP SERPL-CCNC: 70 IU/L (ref 39–117)
ALT SERPL-CCNC: 37 IU/L (ref 0–44)
AST SERPL-CCNC: 32 IU/L (ref 0–40)
BASOPHILS # BLD AUTO: 0 X10E3/UL (ref 0–0.2)
BASOPHILS NFR BLD AUTO: 1 %
BILIRUB SERPL-MCNC: 0.7 MG/DL (ref 0–1.2)
BUN SERPL-MCNC: 8 MG/DL (ref 8–27)
BUN/CREAT SERPL: 8 (ref 10–24)
CALCIUM SERPL-MCNC: 9.3 MG/DL (ref 8.6–10.2)
CHLORIDE SERPL-SCNC: 100 MMOL/L (ref 96–106)
CHOLEST SERPL-MCNC: 135 MG/DL (ref 100–199)
CO2 SERPL-SCNC: 25 MMOL/L (ref 18–29)
CREAT SERPL-MCNC: 1.05 MG/DL (ref 0.76–1.27)
CRP SERPL HS-MCNC: 4.19 MG/L (ref 0–3)
EOSINOPHIL # BLD AUTO: 0.2 X10E3/UL (ref 0–0.4)
EOSINOPHIL NFR BLD AUTO: 6 %
ERYTHROCYTE [DISTWIDTH] IN BLOOD BY AUTOMATED COUNT: 12.8 % (ref 12.3–15.4)
FOLATE SERPL-MCNC: >20 NG/ML
GFR SERPLBLD CREATININE-BSD FMLA CKD-EPI: 69 ML/MIN/1.73
GFR SERPLBLD CREATININE-BSD FMLA CKD-EPI: 80 ML/MIN/1.73
GLOBULIN SER CALC-MCNC: 2.8 G/DL (ref 1.5–4.5)
GLUCOSE SERPL-MCNC: 95 MG/DL (ref 65–99)
HCT VFR BLD AUTO: 43.1 % (ref 37.5–51)
HDLC SERPL-MCNC: 41 MG/DL
HGB BLD-MCNC: 14.4 G/DL (ref 13–17.7)
IMM GRANULOCYTES # BLD: 0 X10E3/UL (ref 0–0.1)
IMM GRANULOCYTES NFR BLD: 1 %
INTERPRETATION, 910389: NORMAL
LDLC SERPL CALC-MCNC: 68 MG/DL (ref 0–99)
LYMPHOCYTES # BLD AUTO: 1 X10E3/UL (ref 0.7–3.1)
LYMPHOCYTES NFR BLD AUTO: 34 %
MCH RBC QN AUTO: 31.2 PG (ref 26.6–33)
MCHC RBC AUTO-ENTMCNC: 33.4 G/DL (ref 31.5–35.7)
MCV RBC AUTO: 94 FL (ref 79–97)
MONOCYTES # BLD AUTO: 0.3 X10E3/UL (ref 0.1–0.9)
MONOCYTES NFR BLD AUTO: 11 %
NEUTROPHILS # BLD AUTO: 1.4 X10E3/UL (ref 1.4–7)
NEUTROPHILS NFR BLD AUTO: 47 %
PLATELET # BLD AUTO: 169 X10E3/UL (ref 150–379)
POTASSIUM SERPL-SCNC: 4.4 MMOL/L (ref 3.5–5.2)
PROT SERPL-MCNC: 7.1 G/DL (ref 6–8.5)
RBC # BLD AUTO: 4.61 X10E6/UL (ref 4.14–5.8)
SODIUM SERPL-SCNC: 143 MMOL/L (ref 134–144)
TRIGL SERPL-MCNC: 128 MG/DL (ref 0–149)
TSH SERPL DL<=0.005 MIU/L-ACNC: 2.32 UIU/ML (ref 0.45–4.5)
VIT B12 SERPL-MCNC: 890 PG/ML (ref 232–1245)
VLDLC SERPL CALC-MCNC: 26 MG/DL (ref 5–40)
WBC # BLD AUTO: 3 X10E3/UL (ref 3.4–10.8)

## 2018-02-01 NOTE — PROGRESS NOTES
Pt was advised and verbalized understanding. He is scheduled to see the cardiologist on 02/15/18 and scheduled to f/u with you on 02/19/2018.

## 2018-02-01 NOTE — PROGRESS NOTES
Labs looked great. Few things to discuss when he comes back. Believe that his symptoms are coming from his heart and hopefully, he will see cardiology soon. Return to see me in 2-weeks.

## 2018-02-06 ENCOUNTER — TELEPHONE (OUTPATIENT)
Dept: FAMILY MEDICINE CLINIC | Age: 78
End: 2018-02-06

## 2018-02-06 NOTE — TELEPHONE ENCOUNTER
Pt was started on new blood pressure medication about a week ago and pt is stating he is having side effects. Pt experiences nervousness, fast heart rate. Pt last took medication on 1/31/18 and stated he stopped taking that new medication and went back to taking the lisinopril 2/2/18.  Please call pt and advise

## 2018-02-06 NOTE — TELEPHONE ENCOUNTER
Returned call to pt and he reports he did not like the s/e of losartan and has resumed taking lisinopril effective 01/31/2018. Upon review of his chart he was prescribed the losartan on 01/30/2018. He denies all sx of HBP today.  He could not come to the office today and has scheduled a f/u appt to discuss with Dr. Austyn Gallego tomorrow at 330 pm.

## 2018-02-07 ENCOUNTER — OFFICE VISIT (OUTPATIENT)
Dept: FAMILY MEDICINE CLINIC | Age: 78
End: 2018-02-07

## 2018-02-07 VITALS
HEART RATE: 71 BPM | DIASTOLIC BLOOD PRESSURE: 70 MMHG | RESPIRATION RATE: 16 BRPM | BODY MASS INDEX: 29.55 KG/M2 | OXYGEN SATURATION: 97 % | SYSTOLIC BLOOD PRESSURE: 149 MMHG | WEIGHT: 195 LBS | TEMPERATURE: 98.3 F | HEIGHT: 68 IN

## 2018-02-07 DIAGNOSIS — M62.838 MUSCLE SPASM OF LEFT SHOULDER: Primary | ICD-10-CM

## 2018-02-07 RX ORDER — TIZANIDINE 2 MG/1
TABLET ORAL
Qty: 15 TAB | Refills: 0 | Status: SHIPPED | OUTPATIENT
Start: 2018-02-07 | End: 2018-02-26 | Stop reason: SINTOL

## 2018-02-07 NOTE — PROGRESS NOTES
CC:  Chief Complaint   Patient presents with    Hypertension      he did not like the s/e of losartan and has resumed taking lisinopril effective 01/31/2018 - reports heart palpatations and nervousness     HISTORY OF PRESENT ILLNESS  Fern Lopes is a 78 y.o. male. HPI Comments: 80[facility] This is his second visit. He had been concerned about Lisinopril and possibility that it was causing symptoms with his BP and tinnitus. We switched it to Lorsartan, but he felt like his heart was very fast and he was nervous. He stopped this and went back to the Lisinopril and symptoms subsided. He is still in the process of being seen by Cardiology and hopefully will have an appointment soon. The hold up has been [employer], his insurance, who did not want to approve him seeing a cardiologist.     Now he is c/o pain in the upper L-shoulder blade that has been on and off for months. Feels like the muscle is spasming. ROS:  Review of Systems   All other systems reviewed and are negative. OBJECTIVE:  Visit Vitals    /70    Pulse 71    Temp 98.3 °F (36.8 °C) (Oral)    Resp 16    Ht 5' 8\" (1.727 m)    Wt 195 lb (88.5 kg)    SpO2 97%    BMI 29.65 kg/m2   Physical Exam   Constitutional:   Anxious. NAD. Cardiovascular: Normal rate and regular rhythm. Pulmonary/Chest: Effort normal and breath sounds normal.   Musculoskeletal: Normal range of motion. Neurological: He is alert. Skin: Skin is warm. Nursing note and vitals reviewed. ASSESSMENT and PLAN    ICD-10-CM ICD-9-CM    1. Muscle spasm of left shoulder M62.838 728.85 tiZANidine (ZANAFLEX) 2 mg tablet     I have discussed the diagnosis with the patient and the intended treatment plan as seen in the above orders. The patient has received an after-visit summary and questions were answered concerning future plans. Asked to return should symptoms worsen or not improve with treatment.  Any pending labs and studies will be relayed to patient when they become available. Pt verbalizes understanding of plan of care and denies further questions or concerns at this time. Follow-up Disposition:  Return if symptoms worsen or fail to improve.

## 2018-02-07 NOTE — PATIENT INSTRUCTIONS
Muscle Cramps: Care Instructions  Your Care Instructions    A muscle cramp occurs when a muscle tightens up suddenly. A cramp often happens in the legs. A muscle cramp is also called a muscle spasm or a charley horse. Muscle cramps usually last less than a minute. However, the pain may last for several minutes. Leg cramps that occur at night may wake you up. Heavy exercise, dehydration, and being overweight can increase your risk of getting cramps. An imbalance of certain chemicals in your blood, called electrolytes, can also lead to muscle cramps. Pregnant women sometimes get muscle cramps during sleep. Muscle cramps can be treated by stretching and massaging the muscle. If cramps keep coming back, your doctor may prescribe medicine that relaxes your muscles. Follow-up care is a key part of your treatment and safety. Be sure to make and go to all appointments, and call your doctor if you are having problems. It's also a good idea to know your test results and keep a list of the medicines you take. How can you care for yourself at home? · Drink plenty of fluids to prevent dehydration. Choose water and other caffeine-free clear liquids until you feel better. If you have kidney, heart, or liver disease and have to limit fluids, talk with your doctor before you increase the amount of fluids you drink. · Stretch your muscles every day, especially before and after exercise and at bedtime. Regular stretching can relax your muscles and may prevent cramps. · Do not suddenly increase the amount of exercise you get. Increase your exercise a little each week. · When you get a cramp, stretch and massage the muscle. You can also take a warm shower or bath to relax the muscle. A heating pad placed on the muscle can also help. · Take a daily multivitamin supplement. · Ask your doctor if you can take an over-the-counter pain medicine, such as acetaminophen (Tylenol), ibuprofen (Advil, Motrin), or naproxen (Aleve). Be safe with medicines. Read and follow all instructions on the label. When should you call for help? Watch closely for changes in your health, and be sure to contact your doctor if:  ? · You get muscle cramps often that do not go away after home treatment. ? · Your muscle cramps often wake you up at night. ? · You do not get better as expected. Where can you learn more? Go to http://prabhjot-willie.info/. Enter W679 in the search box to learn more about \"Muscle Cramps: Care Instructions. \"  Current as of: March 21, 2017  Content Version: 11.4  © 0024-5815 [employer]. Care instructions adapted under license by [employer] (which disclaims liability or warranty for this information). If you have questions about a medical condition or this instruction, always ask your healthcare professional. [address] any warranty or liability for your use of this information.

## 2018-02-07 NOTE — PROGRESS NOTES
Identified pt with two pt identifiers(name and ). Chief Complaint   Patient presents with    Hypertension      he did not like the s/e of losartan and has resumed taking lisinopril effective 2018 - reports heart palpatations and nervousness        Health Maintenance Due   Topic    DTaP/Tdap/Td series (1 - Tdap)    ZOSTER VACCINE AGE 60>     GLAUCOMA SCREENING Q2Y     Pneumococcal 65+ Low/Medium Risk (1 of 2 - PCV13)    MEDICARE YEARLY EXAM     Influenza Age 5 to Adult        Wt Readings from Last 3 Encounters:   18 195 lb (88.5 kg)   18 195 lb (88.5 kg)     Temp Readings from Last 3 Encounters:   18 98.3 °F (36.8 °C) (Oral)   18 98.2 °F (36.8 °C) (Oral)     BP Readings from Last 3 Encounters:   18 149/70   18 160/90     Pulse Readings from Last 3 Encounters:   18 71   18 70         Learning Assessment:  :     Learning Assessment 2018   PRIMARY LEARNER Patient   BARRIERS PRIMARY LEARNER VISUAL   CO-LEARNER CAREGIVER No   PRIMARY LANGUAGE ENGLISH   LEARNER PREFERENCE PRIMARY DEMONSTRATION     LISTENING     READING   ANSWERED BY patient   RELATIONSHIP SELF       Depression Screening:  :     PHQ over the last two weeks 2018   Little interest or pleasure in doing things Not at all   Feeling down, depressed or hopeless Not at all   Total Score PHQ 2 0       Fall Risk Assessment:  :     Fall Risk Assessment, last 12 mths 2018   Able to walk? Yes   Fall in past 12 months? No       Abuse Screening:  :     Abuse Screening Questionnaire 2018   Do you ever feel afraid of your partner? N   Are you in a relationship with someone who physically or mentally threatens you? N   Is it safe for you to go home?  Y       Coordination of Care Questionnaire:  :     1) Have you been to an emergency room, urgent care clinic since your last visit? no   Hospitalized since your last visit? no             2) Have you seen or consulted any other health care providers outside of [address] [place] [address] since your last visit? no  (Include any pap smears or colon screenings in this section.)    3) Do you have an Advance Directive on file? no  Are you interested in receiving information about Advance Directives? no    Patient is accompanied by self. Reviewed record in preparation for visit and have obtained necessary documentation. Medication reconciliation up to date and corrected with patient at this time.

## 2018-02-07 NOTE — MR AVS SNAPSHOT
[address] [facility] 
 
 
 [address] [facility] 
[phone] Patient: Raul Peterson MRN: [id number] [facility]:93/66/9232 Visit Information Date & Time Provider Department Dept. Phone Encounter #  
 2/7/2018  3:30 PM Florin Hinds Quinten Alfonso [phone] [id number] Your Appointments 2/15/2018 11:00 AM  
New Patient with Tree Kumar MD  
[address] ([facility]) Appt Note: Pt ref by PCP Dr. Quyen Padilla DX: irregular heartbeat SK  
 [address] [facility] [address] [facility] [address]  
[phone]  
  
   
 [address] [facility] [address] [facility] [address]  
  
    
 2/19/2018 10:15 AM  
ACUTE CARE with Florin Hinds MD  
[address] [facility]) Appt Note: follow up cardiology appt/recent labs [address] [facility] 860 1067 [facility]  
  
   
 [address] Upcoming Health Maintenance Date Due DTaP/Tdap/Td series (1 - Tdap) 12/24/1963 ZOSTER VACCINE AGE 60> 10/24/2002 GLAUCOMA SCREENING Q2Y 12/24/2007 Pneumococcal 65+ Low/Medium Risk (1 of 2 - PCV13) 12/24/2007 MEDICARE YEARLY EXAM 12/24/2007 Influenza Age 5 to Adult 8/1/2017 Allergies as of 2/7/2018  Review Complete On: 2/7/2018 By: Robb Dyson LPN Severity Noted Reaction Type Reactions Losartan  02/07/2018    Other (comments) Shaking, feeling unbalanced. Current Immunizations  Never Reviewed No immunizations on file. Not reviewed this visit You Were Diagnosed With   
  
 Codes Comments Muscle spasm of left shoulder    -  Primary ICD-10-CM: B08.202 ICD-9-CM: 728.85 Vitals BP Pulse Temp Resp Height(growth percentile) Weight(growth percentile) 149/70 71 98.3 °F (36.8 °C) (Oral) 16 5' 8\" (1.727 m) 195 lb (88.5 kg) SpO2 BMI Smoking Status 97% 29.65 kg/m2 Never Smoker Vitals History BMI and BSA Data Body Mass Index Body Surface Area  
 29.65 kg/m 2 2.06 m 2 Preferred Pharmacy Pharmacy Name Phone [facility] #[id number] [address] [phone] Your Updated Medication List  
  
   
This list is accurate as of: 2/7/18  4:26 PM.  Always use your most recent med list.  
  
  
  
  
 ALPRAZolam 0.25 mg tablet Commonly known as:  Carolyn Snuffer Take 1 Tab by mouth every eight (8) hours as needed. Max Daily Amount: 0.75 mg.  
  
 atorvastatin 10 mg tablet Commonly known as:  LIPITOR Take 10 mg by mouth nightly. fluticasone 50 mcg/actuation nasal spray Commonly known as:  Annie Paget 2 Sprays by Both Nostrils route once over twenty-four (24) hours. LIPOFLAVONOID PO Take 1 Tab by mouth once over twenty-four (24) hours. lisinopril 10 mg tablet Commonly known as:  [place] Art Take 10 mg by mouth once over twenty-four (24) hours. metoprolol succinate 25 mg XL tablet Commonly known as:  TOPROL-XL Take 25 mg by mouth once over twenty-four (24) hours. multivitamin, tx-iron-ca-min 9 mg iron-400 mcg Tab tablet Commonly known as:  THERA-M w/ IRON Take 1 Tab by mouth daily. tiZANidine 2 mg tablet Commonly known as:  Lazaro Barkley Take 1 tablet 1-2 hours prior to bedtime. Prescriptions Sent to Pharmacy Refills  
 tiZANidine (ZANAFLEX) 2 mg tablet 0 Sig: Take 1 tablet 1-2 hours prior to bedtime. Class: Normal  
 Pharmacy: [facility]/pharmacy [address], [address] [facility] Ph #: [phone] Patient Instructions Muscle Cramps: Care Instructions Your Care Instructions A muscle cramp occurs when a muscle tightens up suddenly. A cramp often happens in the legs. A muscle cramp is also called a muscle spasm or a charley horse. Muscle cramps usually last less than a minute.  However, the pain may last for several minutes. Leg cramps that occur at night may wake you up. Heavy exercise, dehydration, and being overweight can increase your risk of getting cramps. An imbalance of certain chemicals in your blood, called electrolytes, can also lead to muscle cramps. Pregnant women sometimes get muscle cramps during sleep. Muscle cramps can be treated by stretching and massaging the muscle. If cramps keep coming back, your doctor may prescribe medicine that relaxes your muscles. Follow-up care is a key part of your treatment and safety. Be sure to make and go to all appointments, and call your doctor if you are having problems. It's also a good idea to know your test results and keep a list of the medicines you take. How can you care for yourself at home? · Drink plenty of fluids to prevent dehydration. Choose water and other caffeine-free clear liquids until you feel better. If you have kidney, heart, or liver disease and have to limit fluids, talk with your doctor before you increase the amount of fluids you drink. · Stretch your muscles every day, especially before and after exercise and at bedtime. Regular stretching can relax your muscles and may prevent cramps. · Do not suddenly increase the amount of exercise you get. Increase your exercise a little each week. · When you get a cramp, stretch and massage the muscle. You can also take a warm shower or bath to relax the muscle. A heating pad placed on the muscle can also help. · Take a daily multivitamin supplement. · Ask your doctor if you can take an over-the-counter pain medicine, such as acetaminophen (Tylenol), ibuprofen (Advil, Motrin), or naproxen (Aleve). Be safe with medicines. Read and follow all instructions on the label. When should you call for help? Watch closely for changes in your health, and be sure to contact your doctor if: 
? · You get muscle cramps often that do not go away after home treatment. ? · Your muscle cramps often wake you up at night. ? · You do not get better as expected. Where can you learn more? Go to http://prabhjot-willie.info/. Enter P117 in the search box to learn more about \"Muscle Cramps: Care Instructions. \" Current as of: March 21, 2017 Content Version: 11.4 © 0227-4763 PredictAd. Care instructions adapted under license by [employer]ist [employer] Shout (which disclaims liability or warranty for this information). If you have questions about a medical condition or this instruction, always ask your healthcare professional. [address] any warranty or liability for your use of this information. Introducing [facility] & HEALTH SERVICES! Eris Calixto introduces AudioTag patient portal. Now you can access parts of your medical record, email your doctor's office, and request medication refills online. 1. In your internet browser, go to [email or web address] 2. Click on the First Time User? Click Here link in the Sign In box. You will see the New Member Sign Up page. 3. Enter your AudioTag Access Code exactly as it appears below. You will not need to use this code after youve completed the sign-up process. If you do not sign up before the expiration date, you must request a new code. · AudioTag Access Code: [email or web address] Expires: 4/30/2018 11:38 AM 
 
4. Enter the last four digits of your Social Security Number (xxxx) and Date of Birth (mm/dd/yyyy) as indicated and click Submit. You will be taken to the next sign-up page. 5. Create a [email or web address] ID. This will be your AudioTag login ID and cannot be changed, so think of one that is secure and easy to remember. 6. Create a AudioTag password. You can change your password at any time. 7. Enter your Password Reset Question and Answer. This can be used at a later time if you forget your password. 8. Enter your e-mail address.  You will receive e-mail notification when new information is available in Postling. 9. Click Sign Up. You can now view and download portions of your medical record. 10. Click the Download Summary menu link to download a portable copy of your medical information. If you have questions, please visit the Frequently Asked Questions section of the Postling website. Remember, Postling is NOT to be used for urgent needs. For medical emergencies, dial 911. Now available from your iPhone and Android! Please provide this summary of care documentation to your next provider. Your primary care clinician is listed as [address]. If you have any questions after today's visit, please call [phone].

## 2018-02-09 ENCOUNTER — OFFICE VISIT (OUTPATIENT)
Dept: CARDIOLOGY CLINIC | Age: 78
End: 2018-02-09

## 2018-02-09 ENCOUNTER — TELEPHONE (OUTPATIENT)
Dept: FAMILY MEDICINE CLINIC | Age: 78
End: 2018-02-09

## 2018-02-09 VITALS
HEIGHT: 68 IN | HEART RATE: 97 BPM | DIASTOLIC BLOOD PRESSURE: 94 MMHG | SYSTOLIC BLOOD PRESSURE: 182 MMHG | WEIGHT: 192.2 LBS | RESPIRATION RATE: 18 BRPM | OXYGEN SATURATION: 95 % | BODY MASS INDEX: 29.13 KG/M2

## 2018-02-09 DIAGNOSIS — I10 ESSENTIAL HYPERTENSION: ICD-10-CM

## 2018-02-09 DIAGNOSIS — R94.31 ABNORMAL EKG: ICD-10-CM

## 2018-02-09 DIAGNOSIS — I49.9 IRREGULAR HEART BEAT: Primary | ICD-10-CM

## 2018-02-09 DIAGNOSIS — H93.12 TINNITUS OF LEFT EAR: ICD-10-CM

## 2018-02-09 DIAGNOSIS — E78.00 PURE HYPERCHOLESTEROLEMIA: ICD-10-CM

## 2018-02-09 RX ORDER — ASPIRIN 81 MG/1
TABLET ORAL DAILY
COMMUNITY
End: 2019-03-21

## 2018-02-09 NOTE — TELEPHONE ENCOUNTER
----- Message from Rni Pérez sent at 2/9/2018 12:07 PM EST -----  Regarding: Dr. Nik Joshua with [facility] through [place] is requesting supporting documentation of the pt's cardiac arrhythmia and hypertension in order to provide approval to see the cardiovascular specialist Dr. Marvin Jones referred pt to. Please fax documentation to [phone], please reference authorization #[id number]. Best contact number [phone].

## 2018-02-09 NOTE — PROGRESS NOTES
Reason for Consult: Irregular heart beat    Referring: Mary Vences MD    HPI: Kamran Gerardo is a 78 y.o. male with past medical history significant for hypertension, dyslipidemia and tinnitus is here for evaluation of irregular heartbeat. This was found on routine examination by Dr. Ramy Tsai. He was asked to see me for follow-up. He denies symptoms of chest pain, palpitations, lightheadedness or dizziness. He has no previous history of cardiac arrhythmias or CAD. He does not recall having previous cardiac evaluation. His blood pressure in the office today was elevated however he conveys that he checks his blood pressure every day in fact he has checked his blood pressure this morning and it was 130/78. His blood pressure usually is less than 706 systolic. EKG today demonstrated normal sinus rhythm with occasional PAC. Normal ST segment. Normal axis. Plan:    1. Irregular heartbeat: EKG today demonstrated occasional PAC however need to rule out if he has any atrial arrhythmias. We will do a 24-hour Holter monitor. Check an echocardiogram for structural heart disease. 2.  Hypertension: Blood pressure elevated in the office today however his own account conveys that he takes blood pressure readings at home and has been normal and less than 614 systolic. Continue to monitor this at home. If he has any significant high blood pressure then we will need to consider adding chlorthalidone. 3.  Tinnitus: He has seen ENT and evaluation was normal.  Will do a bilateral carotid ultrasound to rule out if he has any carotid disease. 4.  Phone follow-up of the test results. Past Medical History:   Diagnosis Date    Hyperlipidemia     Hypertension     Tinnitus             History reviewed. No pertinent surgical history.           Family History   Problem Relation Age of Onset    Hypertension Mother     Stroke Father     Diabetes Sister     Hypertension Sister            Social History Social History    Marital status: SINGLE     Spouse name: N/A    Number of children: N/A    Years of education: N/A     Occupational History    Not on file. Social History Main Topics    Smoking status: Never Smoker    Smokeless tobacco: Never Used    Alcohol use 1.8 - 2.4 oz/week     3 - 4 Standard drinks or equivalent per week    Drug use: No    Sexual activity: No     Other Topics Concern    Not on file     Social History Narrative         Allergies   Allergen Reactions    Losartan Other (comments)     Shaking, feeling unbalanced. Current Outpatient Prescriptions   Medication Sig Dispense Refill    aspirin delayed-release 81 mg tablet Take  by mouth daily.  tiZANidine (ZANAFLEX) 2 mg tablet Take 1 tablet 1-2 hours prior to bedtime. 15 Tab 0    lisinopril (PRINIVIL, ZESTRIL) 10 mg tablet Take 10 mg by mouth once over twenty-four (24) hours. 0    atorvastatin (LIPITOR) 10 mg tablet Take 10 mg by mouth nightly.  multivitamin, tx-iron-ca-min (THERA-M W/ IRON) 9 mg iron-400 mcg tab tablet Take 1 Tab by mouth daily.  BIOFLAV,LEMON/VIT BCOMP,C (LIPOFLAVONOID PO) Take 1 Tab by mouth once over twenty-four (24) hours.  ALPRAZolam (XANAX) 0.25 mg tablet Take 1 Tab by mouth every eight (8) hours as needed. Max Daily Amount: 0.75 mg. 12 Tab 0    fluticasone (FLONASE) 50 mcg/actuation nasal spray 2 Sprays by Both Nostrils route once over twenty-four (24) hours. 1    metoprolol succinate (TOPROL-XL) 25 mg XL tablet Take 25 mg by mouth once over twenty-four (24) hours. 0        ROS:  12 point review of systems was performed.  All negative except for HPI     Physical Exam:  Visit Vitals    BP (!) 182/94 (BP 1 Location: Left arm, BP Patient Position: Sitting)    Pulse 97    Resp 18    Ht 5' 8\" (1.727 m)    Wt 192 lb 3.2 oz (87.2 kg)    SpO2 95%    BMI 29.22 kg/m2       Gen:  Well-developed, well-nourished, in no acute distress  HEENT:  Pink conjunctivae, PERRL, hearing intact to voice, moist mucous membranes  Neck:  Supple, without masses, thyroid non-tender  Resp:  No accessory muscle use, clear breath sounds without wheezes rales or rhonchi  Card:  No murmurs, normal S1, S2 without thrills, bruits or peripheral edema  Abd:  Soft, non-tender, non-distended, normoactive bowel sounds are present, no palpable organomegaly and no detectable hernias  Lymph:  No cervical or inguinal adenopathy  Musc:  No cyanosis or clubbing  Skin:  No rashes or ulcers, skin turgor is good  Neuro:  Cranial nerves are grossly intact, no focal motor weakness, follows commands appropriately  Psych:  Good insight, oriented to person, place and time, alert     Labs:     Lab Results   Component Value Date/Time    WBC 3.0 (L) 01/30/2018 11:56 AM    HGB 14.4 01/30/2018 11:56 AM    HCT 43.1 01/30/2018 11:56 AM    PLATELET 082 55/65/8088 11:56 AM    MCV 94 01/30/2018 11:56 AM     Lab Results   Component Value Date/Time    Glucose 95 01/30/2018 11:56 AM    LDL, calculated 68 01/30/2018 11:56 AM    Creatinine 1.05 01/30/2018 11:56 AM      Lab Results   Component Value Date/Time    Cholesterol, total 135 01/30/2018 11:56 AM    HDL Cholesterol 41 01/30/2018 11:56 AM    LDL, calculated 68 01/30/2018 11:56 AM    Triglyceride 128 01/30/2018 11:56 AM     Lab Results   Component Value Date/Time    ALT (SGPT) 37 01/30/2018 11:56 AM    AST (SGOT) 32 01/30/2018 11:56 AM    Alk.  phosphatase 70 01/30/2018 11:56 AM    Bilirubin, total 0.7 01/30/2018 11:56 AM    Albumin 4.3 01/30/2018 11:56 AM    Protein, total 7.1 01/30/2018 11:56 AM    PLATELET 482 68/12/2159 11:56 AM     No results found for: INR, PTMR, PTP, PT1, PT2   Lab Results   Component Value Date/Time    GFR est non-AA 69 01/30/2018 11:56 AM    GFR est AA 80 01/30/2018 11:56 AM    Creatinine 1.05 01/30/2018 11:56 AM    BUN 8 01/30/2018 11:56 AM    Sodium 143 01/30/2018 11:56 AM    Potassium 4.4 01/30/2018 11:56 AM    Chloride 100 01/30/2018 11:56 AM    CO2 25 01/30/2018 11:56 AM     No results found for: PSA, PSA2, PSAR1, Dala Pill, PSAR3, [id number], [id number], PSALT  Lab Results   Component Value Date/Time    TSH 2.320 01/30/2018 11:56 AM      Lab Results   Component Value Date/Time    Glucose 95 01/30/2018 11:56 AM      No results found for: CPK, RCK1, RCK2, RCK3, RCK4, CKMB, CKNDX, CKND1, TROPT, TROIQ, BNPP, BNP   No results found for: BNP, BNPP, BNPPPOC, XBNPT, BNPNT   Lab Results   Component Value Date/Time    Sodium 143 01/30/2018 11:56 AM    Potassium 4.4 01/30/2018 11:56 AM    Chloride 100 01/30/2018 11:56 AM    CO2 25 01/30/2018 11:56 AM    Glucose 95 01/30/2018 11:56 AM    BUN 8 01/30/2018 11:56 AM    Creatinine 1.05 01/30/2018 11:56 AM    BUN/Creatinine ratio 8 (L) 01/30/2018 11:56 AM    GFR est AA 80 01/30/2018 11:56 AM    GFR est non-AA 69 01/30/2018 11:56 AM    Calcium 9.3 01/30/2018 11:56 AM      Lab Results   Component Value Date/Time    Sodium 143 01/30/2018 11:56 AM    Potassium 4.4 01/30/2018 11:56 AM    Chloride 100 01/30/2018 11:56 AM    CO2 25 01/30/2018 11:56 AM    Glucose 95 01/30/2018 11:56 AM    BUN 8 01/30/2018 11:56 AM    Creatinine 1.05 01/30/2018 11:56 AM    BUN/Creatinine ratio 8 (L) 01/30/2018 11:56 AM    GFR est AA 80 01/30/2018 11:56 AM    GFR est non-AA 69 01/30/2018 11:56 AM    Calcium 9.3 01/30/2018 11:56 AM    Bilirubin, total 0.7 01/30/2018 11:56 AM    ALT (SGPT) 37 01/30/2018 11:56 AM    AST (SGOT) 32 01/30/2018 11:56 AM    Alk. phosphatase 70 01/30/2018 11:56 AM    Protein, total 7.1 01/30/2018 11:56 AM    Albumin 4.3 01/30/2018 11:56 AM    A-G Ratio 1.5 01/30/2018 11:56 AM      No results found for: HBA1C, YYI1YTTQ, HGBE8, ESF6SEJL, JVW2WUBA      No results for input(s): CPK, CKMB, TROIQ in the last 72 hours.     No lab exists for component: CKQMB, CPKMB        Problem List:     Problem List  Date Reviewed: 2/9/2018          Codes Class Noted    Tinnitus of left ear ICD-10-CM: H93.12  ICD-9-CM: 388.30  1/30/2018 Essential hypertension ICD-10-CM: I10  ICD-9-CM: 401.9  1/30/2018        Pure hypercholesterolemia ICD-10-CM: E78.00  ICD-9-CM: 272.0  1/30/2018                Charly Ramirez MD, [facility]

## 2018-02-21 ENCOUNTER — CLINICAL SUPPORT (OUTPATIENT)
Dept: CARDIOLOGY CLINIC | Age: 78
End: 2018-02-21

## 2018-02-21 DIAGNOSIS — I49.1 ATRIAL PREMATURE DEPOLARIZATION: Primary | ICD-10-CM

## 2018-02-21 DIAGNOSIS — I49.9 IRREGULAR HEART BEAT: Primary | ICD-10-CM

## 2018-02-21 DIAGNOSIS — I10 ESSENTIAL HYPERTENSION: ICD-10-CM

## 2018-02-21 DIAGNOSIS — E78.00 PURE HYPERCHOLESTEROLEMIA: ICD-10-CM

## 2018-02-21 DIAGNOSIS — R09.89 CAROTID BRUIT, UNSPECIFIED LATERALITY: Primary | ICD-10-CM

## 2018-02-21 NOTE — PROGRESS NOTES
Applied holter monitor for pt per Dr Fish Room  Dx: palps. Pt has #[id number] & is due back on Mon 2/26/18.

## 2018-02-21 NOTE — PROCEDURES
[facility]  *** FINAL REPORT ***    Name: Arpita Walker  MRN: [id number]      Outpatient  : 24 Dec 1940  HIS Order #: [id number]  [address] [facility] Visit #: [id number]  Date: 2018    TYPE OF TEST: Cerebrovascular Duplex    REASON FOR TEST  Carotid bruit    Right Carotid:-             Proximal               Mid                 Distal  cm/s  Systolic  Diastolic  Systolic  Diastolic  Systolic  Diastolic  CCA:     56.9      10.0                            58.0      11.0  Bulb:  ECA:     94.0       8.0  ICA:     44.0       7.0       43.0      14.0       85.0      24.0  ICA/CCA:  0.8       0.6    ICA Stenosis: Normal    Right Vertebral:-  Finding: Antegrade  Sys:       64.0  Lashawn:       16.0    Right Subclavian:    Left Carotid:-            Proximal                Mid                 Distal  cm/s  Systolic  Diastolic  Systolic  Diastolic  Systolic  Diastolic  CCA:    174.0      18.0                            64.0      15.0  Bulb:  ECA:     94.0       8.0  ICA:     22.0       8.0       63.0      20.0       91.0      30.0  ICA/CCA:  0.3       0.5    ICA Stenosis: Normal    Left Vertebral:-  Finding: Antegrade  Sys:       81.0  Lashawn:       20.0    Left Subclavian:    INTERPRETATION/FINDINGS  PROCEDURE:  Evaluation of the extracranial cerebrovascular arteries  with ultrasound (Grayscale imaging, pulsed Doppler color Doppler). Includes the common carotid, internal carotid, external carotid and  vertebral arteries. FINDINGS:  Mild initmal thickening is present within the common  carotid arteries and the proximal segments of the internal carotid  arteries bilaterally. No appreciable filling defect is seen on color  imaging and peak systolic velocities are within normal limits. There  is mild focal plaque exhibited within the origin of the left external  carotid artery. IMPRESSION:  1)  0-9% stenosis of the right internal carotid artery. 2)  0-9% stenosis of the left internal carotid artery.   3) Vertebral arteries are patent with antegrade flow. ADDITIONAL COMMENTS    I have personally reviewed the data relevant to the interpretation of  this  study.     TECHNOLOGIST: LAURE Everett  Signed: 02/21/2018 04:14 PM    PHYSICIAN: Kylie Willis MD  Signed: 02/21/2018 05:13 PM

## 2018-02-26 ENCOUNTER — TELEPHONE (OUTPATIENT)
Dept: CARDIOLOGY CLINIC | Age: 78
End: 2018-02-26

## 2018-02-26 ENCOUNTER — OFFICE VISIT (OUTPATIENT)
Dept: FAMILY MEDICINE CLINIC | Age: 78
End: 2018-02-26

## 2018-02-26 VITALS
HEIGHT: 68 IN | BODY MASS INDEX: 28.95 KG/M2 | HEART RATE: 82 BPM | TEMPERATURE: 98.1 F | OXYGEN SATURATION: 98 % | SYSTOLIC BLOOD PRESSURE: 135 MMHG | WEIGHT: 191 LBS | DIASTOLIC BLOOD PRESSURE: 66 MMHG | RESPIRATION RATE: 16 BRPM

## 2018-02-26 DIAGNOSIS — G47.09 OTHER INSOMNIA: ICD-10-CM

## 2018-02-26 DIAGNOSIS — I10 ESSENTIAL HYPERTENSION: Primary | ICD-10-CM

## 2018-02-26 RX ORDER — METOPROLOL SUCCINATE 25 MG/1
25 TABLET, EXTENDED RELEASE ORAL DAILY
COMMUNITY
End: 2018-04-18 | Stop reason: ALTCHOICE

## 2018-02-26 RX ORDER — TRAZODONE HYDROCHLORIDE 50 MG/1
50 TABLET ORAL
Qty: 30 TAB | Refills: 1 | Status: SHIPPED | OUTPATIENT
Start: 2018-02-26 | End: 2018-03-28

## 2018-02-26 RX ORDER — LISINOPRIL 10 MG/1
10 TABLET ORAL DAILY
COMMUNITY
End: 2018-03-19 | Stop reason: SDUPTHER

## 2018-02-26 NOTE — MR AVS SNAPSHOT
[address] [facility] 
 
 
 [facility] [address] [phone] Patient: Kevin Loja MRN: [id number] VGT:[id number] Visit Information Date & Time Provider Department Dept. Phone Encounter #  
 2/26/2018 11:00 AM Budd Spatz, [place] [phone] [id number] Upcoming Health Maintenance Date Due DTaP/Tdap/Td series (1 - Tdap) 12/24/1963 ZOSTER VACCINE AGE 60> 10/24/2002 GLAUCOMA SCREENING Q2Y 12/24/2007 Pneumococcal 65+ Low/Medium Risk (1 of 2 - PCV13) 12/24/2007 MEDICARE YEARLY EXAM 12/24/2007 Influenza Age 5 to Adult 8/1/2017 Allergies as of 2/26/2018  Review Complete On: 2/26/2018 By: Bebe Daley LPN Severity Noted Reaction Type Reactions Losartan  02/07/2018    Other (comments) Shaking, feeling unbalanced. Current Immunizations  Never Reviewed No immunizations on file. Not reviewed this visit You Were Diagnosed With   
  
 Codes Comments Essential hypertension    -  Primary ICD-10-CM: I10 
ICD-9-CM: 401.9 Vitals BP Pulse Temp Resp Height(growth percentile) Weight(growth percentile) 135/66 (BP 1 Location: Left arm, BP Patient Position: Sitting) 82 98.1 °F (36.7 °C) (Oral) 16 5' 8\" (1.727 m) 191 lb (86.6 kg) SpO2 BMI Smoking Status 98% 29.04 kg/m2 Never Smoker Vitals History BMI and BSA Data Body Mass Index Body Surface Area 29.04 kg/m 2 2.04 m 2 Preferred Pharmacy Pharmacy Name Phone [id number] [phone] Your Updated Medication List  
  
   
This list is accurate as of 2/26/18 11:58 AM.  Always use your most recent med list.  
  
  
  
  
 ALPRAZolam 0.25 mg tablet Commonly known as:  Tarsha Tucker Take 1 Tab by mouth every eight (8) hours as needed for Anxiety. Max Daily Amount: 0.75 mg.  
  
 aspirin delayed-release 81 mg tablet Take  by mouth daily. atorvastatin 10 mg tablet Commonly known as:  LIPITOR Take 10 mg by mouth daily. LIPOFLAVONOID PO Take 1 Tab by mouth once over twenty-four (24) hours. lisinopril 10 mg tablet Commonly known as:  Era Elaine Take 10 mg by mouth daily. metoprolol succinate 25 mg XL tablet Commonly known as:  TOPROL-XL Take 25 mg by mouth daily. multivitamin, tx-iron-ca-min 9 mg iron-400 mcg Tab tablet Commonly known as:  THERA-M w/ IRON Take 1 Tab by mouth daily. Introducing [facility] & HEALTH SERVICES! Irish Singh introduces tenKsolar patient portal. Now you can access parts of your medical record, email your doctor's office, and request medication refills online. 1. In your internet browser, go to [email or web address] 2. Click on the First Time User? Click Here link in the Sign In box. You will see the New Member Sign Up page. 3. Enter your tenKsolar Access Code exactly as it appears below. You will not need to use this code after youve completed the sign-up process. If you do not sign up before the expiration date, you must request a new code. · tenKsolar Access Code: [email or web address] Expires: 4/30/2018 11:38 AM 
 
4. Enter the last four digits of your Social Security Number (xxxx) and Date of Birth (mm/dd/yyyy) as indicated and click Submit. You will be taken to the next sign-up page. 5. Create a tenKsolar ID. This will be your tenKsolar login ID and cannot be changed, so think of one that is secure and easy to remember. 6. Create a tenKsolar password. You can change your password at any time. 7. Enter your Password Reset Question and Answer. This can be used at a later time if you forget your password. 8. Enter your e-mail address. You will receive e-mail notification when new information is available in [address]. 9. Click Sign Up. You can now view and download portions of your medical record. 10. Click the Download Summary menu link to download a portable copy of your medical information. If you have questions, please visit the Frequently Asked Questions section of the Actimagine website. Remember, Actimagine is NOT to be used for urgent needs. For medical emergencies, dial 911. Now available from your iPhone and Android! Please provide this summary of care documentation to your next provider. Your primary care clinician is listed as [address]. If you have any questions after today's visit, please call [phone].

## 2018-02-26 NOTE — PROGRESS NOTES
Subjective:     Karen Manzo is a 78 y.o. male who presents for follow up of hypertension. Since his last evaluation, he has been seen by cardiology. Still awaiting all of his results. However, so far, it looks like his heart is in good condition and probably not contributing to the persistent tinnitus. He reports that the tinnitus is lessening. He also tends to be very anxious and was made more so when we changed his Lisinopril to Losartan. His last PCP had also been giving him Xanax from time to time. I discussed with him that long term use of benzo's was not recommended. He reported that he had onset of palpitations and really did not like how the Losartan made him feel. He is now back on Lisinopril and feeling better. We discussed the possibility of having him revisit either ENT or neurology in the future. Diet and Lifestyle: generally follows a low fat low cholesterol diet, generally follows a low sodium diet  Home BP Monitoring: is well controlled at home, ranging 130's/80's    Cardiovascular ROS: taking medications as instructed, no medication side effects noted, no TIA's, no chest pain on exertion, no dyspnea on exertion, no swelling of ankles. New concerns:   Chief Complaint   Patient presents with    Hypertension     here for follow up after change in medication - pt brought his blood pressure cuff from home to test against our cuff and his BP cuff reading was 143/60    Medication Evaluation       Allergies: Allergies   Allergen Reactions    Losartan Other (comments)     Shaking, feeling unbalanced. Past Medical History:  Past Medical History:   Diagnosis Date    Diverticulitis     High cholesterol     Hyperlipidemia     Hypertension     Irregular heart beat     Tinnitus        Medications:  Current Outpatient Prescriptions   Medication Sig Dispense Refill    metoprolol succinate (TOPROL-XL) 25 mg XL tablet Take 25 mg by mouth daily.       lisinopril (Baljinder [place]) 10 mg tablet Take 10 mg by mouth daily.  traZODone (DESYREL) 50 mg tablet Take 1 Tab by mouth nightly for 30 days. 30 Tab 1    aspirin delayed-release 81 mg tablet Take  by mouth daily.  multivitamin, tx-iron-ca-min (THERA-M W/ IRON) 9 mg iron-400 mcg tab tablet Take 1 Tab by mouth daily.  BIOFLAV,LEMON/VIT BCOMP,C (LIPOFLAVONOID PO) Take 1 Tab by mouth once over twenty-four (24) hours.  ALPRAZolam (XANAX) 0.25 mg tablet Take 1 Tab by mouth every eight (8) hours as needed for Anxiety. Max Daily Amount: 0.75 mg. 12 Tab 0    atorvastatin (LIPITOR) 10 mg tablet Take 10 mg by mouth daily. Social History:  Social History   Substance Use Topics    Smoking status: Never Smoker    Smokeless tobacco: Never Used    Alcohol use 1.8 - 2.4 oz/week     3 - 4 Standard drinks or equivalent per week      Comment: socially           Review of Systems, additional:  Pertinent items are noted in HPI. Objective:     Visit Vitals    /66 (BP 1 Location: Left arm, BP Patient Position: Sitting)    Pulse 82    Temp 98.1 °F (36.7 °C) (Oral)    Resp 16    Ht 5' 8\" (1.727 m)    Wt 191 lb (86.6 kg)    SpO2 98%    BMI 29.04 kg/m2     Appearance: alert, well appearing, and in no distress and normal appearing weight. General exam: CVS exam BP noted to be well controlled today in office, S1, S2 normal, no gallop, no murmur, chest clear, no JVD, no HSM, no edema, peripheral vascular exam both carotids normal upstroke without bruits, neurological exam alert, oriented, normal speech, no focal findings or movement disorder noted. Lab review: no lab studies available for review at time of visit. Assessment/Plan:       ICD-10-CM ICD-9-CM    1. Essential hypertension I10 401.9 Well controlled   2. Other insomnia G47.09 780.52 traZODone (DESYREL) 50 mg tablet     77M who I am just getting to know. He has had ongoing problems with tinnitus and recently had some problems with adjusting to new medications. He seems to have an affinity for sedating medications. He is c/o insomnia. Will try Trazodone. Potential side effects discussed. Would again avoid benzo's. He will let me know if the Trazodone if helpful. I have discussed the diagnosis with the patient and the intended treatment plan as seen in the above orders. The patient has received an after-visit summary and questions were answered concerning future plans. Asked to return should symptoms worsen or not improve with treatment. Any pending labs and studies will be relayed to patient when they become available. Pt verbalizes understanding of plan of care and denies further questions or concerns at this time. Follow-up Disposition:  Return if symptoms worsen or fail to improve.

## 2018-02-26 NOTE — PROGRESS NOTES
Identified pt with two pt identifiers(name and ). Chief Complaint   Patient presents with    Hypertension     here for follow up after change in medication - pt brought his blood pressure cuff from home to test against our cuff and his BP cuff reading was 143/60    Medication Evaluation        Health Maintenance Due   Topic    DTaP/Tdap/Td series (1 - Tdap)    ZOSTER VACCINE AGE 60>     GLAUCOMA SCREENING Q2Y     Pneumococcal 65+ Low/Medium Risk (1 of 2 - PCV13)    MEDICARE YEARLY EXAM     Influenza Age 5 to Adult        Wt Readings from Last 3 Encounters:   18 191 lb (86.6 kg)   18 192 lb 3.2 oz (87.2 kg)   18 195 lb (88.5 kg)     Temp Readings from Last 3 Encounters:   18 98.1 °F (36.7 °C) (Oral)   18 98.3 °F (36.8 °C) (Oral)   18 98.2 °F (36.8 °C) (Oral)     BP Readings from Last 3 Encounters:   18 135/66   18 (!) 182/94   18 149/70     Pulse Readings from Last 3 Encounters:   18 82   18 97   18 71         Learning Assessment:  :     Learning Assessment 2018   PRIMARY LEARNER Patient   BARRIERS PRIMARY LEARNER VISUAL   CO-LEARNER CAREGIVER No   PRIMARY LANGUAGE ENGLISH   LEARNER PREFERENCE PRIMARY DEMONSTRATION     LISTENING     READING   ANSWERED BY patient   RELATIONSHIP SELF       Depression Screening:  :     PHQ over the last two weeks 2018   Little interest or pleasure in doing things Not at all   Feeling down, depressed or hopeless Not at all   Total Score PHQ 2 0       Fall Risk Assessment:  :     Fall Risk Assessment, last 12 mths 2018   Able to walk? Yes   Fall in past 12 months? No       Abuse Screening:  :     Abuse Screening Questionnaire 2018   Do you ever feel afraid of your partner? N   Are you in a relationship with someone who physically or mentally threatens you? N   Is it safe for you to go home?  Y       Coordination of Care Questionnaire:  :     1) Have you been to an emergency room, urgent care clinic since your last visit? no   Hospitalized since your last visit? no             2) Have you seen or consulted any other health care providers outside of [address] [place] [address] since your last visit? yes, Yes, has seen ENT and cardiology since his last office visit  (Include any pap smears or colon screenings in this section.)    3) Do you have an Advance Directive on file? no  Are you interested in receiving information about Advance Directives? no    Patient is accompanied by self. Reviewed record in preparation for visit and have obtained necessary documentation. Medication reconciliation up to date and corrected with patient at this time.

## 2018-02-27 ENCOUNTER — DOCUMENTATION ONLY (OUTPATIENT)
Dept: CARDIOLOGY CLINIC | Age: 78
End: 2018-02-27

## 2018-02-27 NOTE — TELEPHONE ENCOUNTER
Pt calling to get his echo test results. Please give him a call back @ [phone]. Thanks!   Deisy Gao

## 2018-02-28 ENCOUNTER — HOSPITAL ENCOUNTER (OUTPATIENT)
Dept: LAB | Age: 78
Discharge: HOME | End: 2018-02-28

## 2018-02-28 NOTE — TELEPHONE ENCOUNTER
Called and spoke to pt, informed of ECHO results. Pt wants holter results as well.  Informed we will call him back once Dr. Jovanny Park has reviewed

## 2018-03-01 NOTE — PROGRESS NOTES
Pls notify>> minimal plaque in the carotids. This is not the cause of his Tinnitis. Continue Lipitor.

## 2018-03-20 RX ORDER — LISINOPRIL 10 MG/1
TABLET ORAL
Qty: 90 TAB | Refills: 0 | Status: SHIPPED | OUTPATIENT
Start: 2018-03-20 | End: 2018-04-18 | Stop reason: SDUPTHER

## 2018-03-20 RX ORDER — ATORVASTATIN CALCIUM 10 MG/1
TABLET, FILM COATED ORAL
Qty: 30 TAB | Refills: 0 | Status: SHIPPED | OUTPATIENT
Start: 2018-03-20 | End: 2018-04-12 | Stop reason: SDUPTHER

## 2018-03-31 ENCOUNTER — HOSPITAL ENCOUNTER (EMERGENCY)
Age: 78
Discharge: HOME | End: 2018-03-31
Attending: EMERGENCY MEDICINE
Payer: MEDICARE

## 2018-03-31 VITALS
SYSTOLIC BLOOD PRESSURE: 128 MMHG | OXYGEN SATURATION: 100 % | HEART RATE: 63 BPM | RESPIRATION RATE: 13 BRPM | DIASTOLIC BLOOD PRESSURE: 59 MMHG

## 2018-03-31 DIAGNOSIS — R00.2 PALPITATIONS: Primary | ICD-10-CM

## 2018-03-31 LAB
ALBUMIN SERPL-MCNC: 3.7 G/DL (ref 3.5–5)
ALBUMIN/GLOB SERPL: 1 {RATIO} (ref 1.1–2.2)
ALP SERPL-CCNC: 72 U/L (ref 45–117)
ALT SERPL-CCNC: 38 U/L (ref 12–78)
ANION GAP SERPL CALC-SCNC: 11 MMOL/L (ref 5–15)
AST SERPL-CCNC: 31 U/L (ref 15–37)
BASOPHILS # BLD: 0 K/UL (ref 0–0.1)
BASOPHILS NFR BLD: 1 % (ref 0–1)
BILIRUB SERPL-MCNC: 0.6 MG/DL (ref 0.2–1)
BUN SERPL-MCNC: 16 MG/DL (ref 6–20)
BUN/CREAT SERPL: 13 (ref 12–20)
CALCIUM SERPL-MCNC: 9 MG/DL (ref 8.5–10.1)
CHLORIDE SERPL-SCNC: 106 MMOL/L (ref 97–108)
CO2 SERPL-SCNC: 25 MMOL/L (ref 21–32)
CREAT SERPL-MCNC: 1.24 MG/DL (ref 0.7–1.3)
DIFFERENTIAL METHOD BLD: ABNORMAL
EOSINOPHIL # BLD: 0.3 K/UL (ref 0–0.4)
EOSINOPHIL NFR BLD: 9 % (ref 0–7)
ERYTHROCYTE [DISTWIDTH] IN BLOOD BY AUTOMATED COUNT: 11.9 % (ref 11.5–14.5)
GLOBULIN SER CALC-MCNC: 3.7 G/DL (ref 2–4)
GLUCOSE SERPL-MCNC: 96 MG/DL (ref 65–100)
HCT VFR BLD AUTO: 41.6 % (ref 36.6–50.3)
HGB BLD-MCNC: 14 G/DL (ref 12.1–17)
IMM GRANULOCYTES # BLD: 0 K/UL (ref 0–0.04)
IMM GRANULOCYTES NFR BLD AUTO: 0 % (ref 0–0.5)
LYMPHOCYTES # BLD: 1.4 K/UL (ref 0.8–3.5)
LYMPHOCYTES NFR BLD: 41 % (ref 12–49)
MAGNESIUM SERPL-MCNC: 1.8 MG/DL (ref 1.6–2.4)
MCH RBC QN AUTO: 31 PG (ref 26–34)
MCHC RBC AUTO-ENTMCNC: 33.7 G/DL (ref 30–36.5)
MCV RBC AUTO: 92.2 FL (ref 80–99)
MONOCYTES # BLD: 0.4 K/UL (ref 0–1)
MONOCYTES NFR BLD: 11 % (ref 5–13)
NEUTS SEG # BLD: 1.3 K/UL (ref 1.8–8)
NEUTS SEG NFR BLD: 38 % (ref 32–75)
NRBC # BLD: 0 K/UL (ref 0–0.01)
NRBC BLD-RTO: 0 PER 100 WBC
PLATELET # BLD AUTO: 162 K/UL (ref 150–400)
PMV BLD AUTO: 10 FL (ref 8.9–12.9)
POTASSIUM SERPL-SCNC: 3.6 MMOL/L (ref 3.5–5.1)
PROT SERPL-MCNC: 7.4 G/DL (ref 6.4–8.2)
RBC # BLD AUTO: 4.51 M/UL (ref 4.1–5.7)
SODIUM SERPL-SCNC: 142 MMOL/L (ref 136–145)
TROPONIN I SERPL-MCNC: <0.04 NG/ML
WBC # BLD AUTO: 3.5 K/UL (ref 4.1–11.1)

## 2018-03-31 PROCEDURE — 74011250637 HC RX REV CODE- 250/637: Performed by: EMERGENCY MEDICINE

## 2018-03-31 PROCEDURE — 83735 ASSAY OF MAGNESIUM: CPT | Performed by: EMERGENCY MEDICINE

## 2018-03-31 PROCEDURE — 99285 EMERGENCY DEPT VISIT HI MDM: CPT

## 2018-03-31 PROCEDURE — 36415 COLL VENOUS BLD VENIPUNCTURE: CPT | Performed by: EMERGENCY MEDICINE

## 2018-03-31 PROCEDURE — 84484 ASSAY OF TROPONIN QUANT: CPT | Performed by: EMERGENCY MEDICINE

## 2018-03-31 PROCEDURE — 85025 COMPLETE CBC W/AUTO DIFF WBC: CPT | Performed by: EMERGENCY MEDICINE

## 2018-03-31 PROCEDURE — 93005 ELECTROCARDIOGRAM TRACING: CPT

## 2018-03-31 PROCEDURE — 80053 COMPREHEN METABOLIC PANEL: CPT | Performed by: EMERGENCY MEDICINE

## 2018-03-31 RX ORDER — SODIUM CHLORIDE 0.9 % (FLUSH) 0.9 %
5-10 SYRINGE (ML) INJECTION EVERY 8 HOURS
Status: DISCONTINUED | OUTPATIENT
Start: 2018-03-31 | End: 2018-03-31 | Stop reason: HOSPADM

## 2018-03-31 RX ORDER — IBUPROFEN 800 MG/1
800 TABLET ORAL
Status: COMPLETED | OUTPATIENT
Start: 2018-03-31 | End: 2018-03-31

## 2018-03-31 RX ORDER — SODIUM CHLORIDE 0.9 % (FLUSH) 0.9 %
5-10 SYRINGE (ML) INJECTION AS NEEDED
Status: DISCONTINUED | OUTPATIENT
Start: 2018-03-31 | End: 2018-03-31 | Stop reason: HOSPADM

## 2018-03-31 RX ADMIN — IBUPROFEN 800 MG: 800 TABLET ORAL at 08:23

## 2018-03-31 NOTE — ED TRIAGE NOTES
C/O Chest pain, radiating to left arm/neck  Woke up coughing,diaphoretic, nauseous  Patient roomed to obtain EKG, complete triage

## 2018-03-31 NOTE — DISCHARGE INSTRUCTIONS
Palpitations: Care Instructions  Your Care Instructions    Heart palpitations are the uncomfortable sensation that your heart is beating fast or irregularly. You might feel pounding or fluttering in your chest. It might feel like your heart is skipping a beat. Although palpitations may be caused by a heart problem, they also occur because of stress, fatigue, or use of alcohol, caffeine, or nicotine. Many medicines, including diet pills, antihistamines, decongestants, and some herbal products, can cause heart palpitations. Nearly everyone has palpitations from time to time. Depending on your symptoms, your doctor may need to do more tests to try to find the cause of your palpitations. Follow-up care is a key part of your treatment and safety. Be sure to make and go to all appointments, and call your doctor if you are having problems. It's also a good idea to know your test results and keep a list of the medicines you take. How can you care for yourself at home? · Avoid caffeine, nicotine, and excess alcohol. · Do not take illegal drugs, such as methamphetamines and cocaine. · Do not take weight loss or diet medicines unless you talk with your doctor first.  · Get plenty of sleep. · Do not overeat. · If you have palpitations again, take deep breaths and try to relax. This may slow a racing heart. · If you start to feel lightheaded, lie down to avoid injuries that might result if you pass out and fall down. · Keep a record of your palpitations and bring it to your next doctor's appointment. Write down:  ¨ The date and time. ¨ Your pulse. (If your heart is beating fast, it may be hard to count your pulse.)  ¨ What you were doing when the palpitations started. ¨ How long the palpitations lasted. ¨ Any other symptoms. · If an activity causes palpitations, slow down or stop. Talk to your doctor before you do that activity again. · Take your medicines exactly as prescribed.  Call your doctor if you think you are having a problem with your medicine. When should you call for help? Call 911 anytime you think you may need emergency care. For example, call if:  ? · You passed out (lost consciousness). ? · You have symptoms of a heart attack. These may include:  ¨ Chest pain or pressure, or a strange feeling in the chest.  ¨ Sweating. ¨ Shortness of breath. ¨ Pain, pressure, or a strange feeling in the back, neck, jaw, or upper belly or in one or both shoulders or arms. ¨ Lightheadedness or sudden weakness. ¨ A fast or irregular heartbeat. After you call 911, the  may tell you to chew 1 adult-strength or 2 to 4 low-dose aspirin. Wait for an ambulance. Do not try to drive yourself. ? · You have symptoms of a stroke. These may include:  ¨ Sudden numbness, tingling, weakness, or loss of movement in your face, arm, or leg, especially on only one side of your body. ¨ Sudden vision changes. ¨ Sudden trouble speaking. ¨ Sudden confusion or trouble understanding simple statements. ¨ Sudden problems with walking or balance. ¨ A sudden, severe headache that is different from past headaches. ?Call your doctor now or seek immediate medical care if:  ? · You have heart palpitations and:  ¨ Are dizzy or lightheaded, or you feel like you may faint. ¨ Have new or increased shortness of breath. ? Watch closely for changes in your health, and be sure to contact your doctor if:  ? · You continue to have heart palpitations. Where can you learn more? Go to http://prabhjot-willie.info/. Enter R508 in the search box to learn more about \"Palpitations: Care Instructions. \"  Current as of: September 21, 2016  Content Version: 11.4  © 2732-6895 [employer]. Care instructions adapted under license by [employer] (which disclaims liability or warranty for this information).  If you have questions about a medical condition or this instruction, always ask your healthcare professional. [address] any warranty or liability for your use of this information.

## 2018-03-31 NOTE — ED PROVIDER NOTES
HPI Comments: 78 y.o. male with past medical history significant for high cholesterol, irregular heart beat, diverticulitis, HTN, hyperlipidemia and tinnitus who presents from home with chief complaint of palpitations. Per pt, he woke up this morning around 0200, with sudden onset of a sensation of a rapid heart heart beat and diaphoresis. The pt adds that he experienced onset of a nausea several minutes later, yet did not actively vomit. Pt notes that he has hx of arrhythmia in the past, which he believes was related to a-fib. Per pt, he has no hx of similar palpitations in the past. He makes it known that he is currently taking lisinopril as well as metoprolol, with his last dose of the metoprolol taken this morning PTA. Pt states that he is not currently on Coumadin or Xarelto. While in the ED, the pt states that he remains with a rapid HR sensation as well as some nausea. Per pt, he has been experiencing intermittent left sided head numbness for the past two weeks, which he states he has experienced in the past. The pt reports that he was seen by a ENT specialist in October and informed that the sensation was likely due to tinitus. Over the past couple days,the pt adds that he has also experienced a pain down his LUE, which has not been present with similar palpitations or diaphoresis. The pt rates his current pain 4/10. Per pt, his pain appears exacerbated at times when turning over in bed. Pt denies fever, chills, vomiting, diarrhea, CP, SOB, back pain, abd pain, headache, decreased appetite and urinary symptoms. There are no other acute medical concerns at this time. Social hx: Non-smoker, Current ETOH consumption    PCP: Janie Palacio MD    Cardiologist: Michele Suarez MD     Note written by Juanito Doss, as dictated by Jayme Arce MD 7:24 AM          The history is provided by the patient. No  was used.         Past Medical History:   Diagnosis Date    Diverticulitis     High cholesterol     Hyperlipidemia     Hypertension     Irregular heart beat     Tinnitus        No past surgical history on file. Family History:   Problem Relation Age of Onset    Hypertension Mother     Stroke Father     Diabetes Sister     Hypertension Sister        Social History     Social History    Marital status: SINGLE     Spouse name: N/A    Number of children: N/A    Years of education: N/A     Occupational History    Not on file. Social History Main Topics    Smoking status: Never Smoker    Smokeless tobacco: Never Used    Alcohol use 1.8 - 2.4 oz/week     3 - 4 Standard drinks or equivalent per week      Comment: socially    Drug use: No    Sexual activity: No     Other Topics Concern    Not on file     Social History Narrative    ** Merged History Encounter **              ALLERGIES: Losartan    Review of Systems   Constitutional: Positive for diaphoresis. Negative for appetite change, chills and fever. HENT: Negative for rhinorrhea, sore throat and trouble swallowing. Eyes: Negative for photophobia. Respiratory: Negative for cough and shortness of breath. Cardiovascular: Positive for palpitations (onset this morning ). Negative for chest pain. Gastrointestinal: Negative for abdominal pain, nausea and vomiting. Genitourinary: Negative for dysuria, frequency and hematuria. Musculoskeletal: Positive for myalgias (intermittent LUE pain over past couple days ). Negative for arthralgias and back pain. Neurological: Positive for dizziness and numbness (Intermittent left sided head numbness for 2x weeks. ). Negative for syncope, weakness and headaches. Psychiatric/Behavioral: Negative for behavioral problems. The patient is not nervous/anxious. All other systems reviewed and are negative.       Vitals:    03/31/18 0715 03/31/18 0716 03/31/18 0730   BP:  157/68    Pulse:  66 63   Resp:  18 15   SpO2: 94% 97% 99%            Physical Exam Constitutional: He is oriented to person, place, and time. He appears well-developed and well-nourished. No distress. HENT:   Head: Normocephalic and atraumatic. Eyes: Conjunctivae are normal. No scleral icterus. Neck: Neck supple. No tracheal deviation present. Cardiovascular: Normal rate, normal heart sounds and intact distal pulses. Exam reveals no gallop and no friction rub. No murmur heard. Irregular rhythm    Pulmonary/Chest: Effort normal and breath sounds normal. He has no wheezes. He has no rales. Abdominal: Soft. He exhibits no distension. There is no tenderness. There is no rebound and no guarding. Musculoskeletal: He exhibits no edema. Neurological: He is alert and oriented to person, place, and time. Skin: Skin is warm and dry. No rash noted. Psychiatric: He has a normal mood and affect. Nursing note and vitals reviewed. Note written by Juanito East, as dictated by Xiomara Rios MD 7:24 AM    [facility]      ED Course       Procedures    ED EKG interpretation:  Rhythm: normal sinus rhythm with frequent PAC/Bigeminy; Rate (approx.): 63 bpm; Axis: normal; ST/T wave: non-specific changes    Note written by Juanito East, as dictated by Xiomara Rios MD 7:24 AM    Progress Note:  Results, treatment, and follow up plan have been discussed with patient. Questions were answered. Xiomara Rios MD  8:18 AM    A/P: palpitations - EKG shows frequent PAC's in bigeminy pattern; reassuring appearance and exam; VSS; CBC, CMP, trop, EKG ok;  home with PCP/cards f/u. Xiomara Rios MD  8:20 AM

## 2018-03-31 NOTE — ED NOTES
Pt c/o left arm and chest pain starting at 0100 today, pain woke him up out of his sleep, also was diaphoretic during episode.  H/o HTN

## 2018-04-03 LAB
ATRIAL RATE: 75 BPM
CALCULATED P AXIS, ECG09: 73 DEGREES
CALCULATED R AXIS, ECG10: 7 DEGREES
CALCULATED T AXIS, ECG11: 48 DEGREES
DIAGNOSIS, 93000: NORMAL
P-R INTERVAL, ECG05: 208 MS
Q-T INTERVAL, ECG07: 386 MS
QRS DURATION, ECG06: 84 MS
QTC CALCULATION (BEZET), ECG08: 431 MS
VENTRICULAR RATE, ECG03: 75 BPM

## 2018-04-12 RX ORDER — ATORVASTATIN CALCIUM 10 MG/1
TABLET, FILM COATED ORAL
Qty: 90 TAB | Refills: 1 | Status: SHIPPED | OUTPATIENT
Start: 2018-04-12 | End: 2018-04-18 | Stop reason: SDUPTHER

## 2018-04-18 ENCOUNTER — OFFICE VISIT (OUTPATIENT)
Dept: CARDIOLOGY CLINIC | Age: 78
End: 2018-04-18

## 2018-04-18 VITALS
DIASTOLIC BLOOD PRESSURE: 80 MMHG | BODY MASS INDEX: 29.34 KG/M2 | OXYGEN SATURATION: 99 % | HEIGHT: 68 IN | HEART RATE: 72 BPM | WEIGHT: 193.6 LBS | SYSTOLIC BLOOD PRESSURE: 160 MMHG | RESPIRATION RATE: 16 BRPM

## 2018-04-18 DIAGNOSIS — I49.9 IRREGULAR HEART BEAT: Primary | ICD-10-CM

## 2018-04-18 DIAGNOSIS — I10 ESSENTIAL HYPERTENSION: ICD-10-CM

## 2018-04-18 RX ORDER — ATORVASTATIN CALCIUM 10 MG/1
TABLET, FILM COATED ORAL
Qty: 90 TAB | Refills: 0 | Status: SHIPPED | OUTPATIENT
Start: 2018-04-18 | End: 2018-06-20 | Stop reason: DRUGHIGH

## 2018-04-18 RX ORDER — LOSARTAN POTASSIUM 50 MG/1
50 TABLET ORAL DAILY
Qty: 90 TAB | Refills: 0 | Status: CANCELLED | OUTPATIENT
Start: 2018-04-18

## 2018-04-18 RX ORDER — DILTIAZEM HYDROCHLORIDE 180 MG/1
180 CAPSULE, COATED, EXTENDED RELEASE ORAL DAILY
Qty: 30 CAP | Refills: 3 | Status: SHIPPED | OUTPATIENT
Start: 2018-04-18 | End: 2018-04-27 | Stop reason: ALTCHOICE

## 2018-04-18 RX ORDER — LISINOPRIL 10 MG/1
TABLET ORAL
Qty: 90 TAB | Refills: 0 | Status: SHIPPED | OUTPATIENT
Start: 2018-04-18 | End: 2018-06-20 | Stop reason: DRUGHIGH

## 2018-04-18 NOTE — TELEPHONE ENCOUNTER
Note from 2/26/18 states pt had side effects from Losartan and was back on Lisinopril. DC Losartan. I refilled Lisinopril.

## 2018-04-18 NOTE — TELEPHONE ENCOUNTER
I noticed a note on the BP script for his lisinopril. From pharmacy? ? Pt was last seen in the ER on 03/31/2018 and advised ER staff he is taking both lisinopril and metoprolol. His last office visit with Dr. Iman Marsh was on 02/26/2018 and both medication are in that note under, \"current outpt prescriptions\".

## 2018-04-18 NOTE — PROGRESS NOTES
Chief Complaint   Patient presents with    New Patient     Referred by Dr. Varghees Crespo: Holter Report       1. Have you been to the ER, urgent care clinic since your last visit? Hospitalized since your last visit? Yes. [facility] on 3/31/18 for palps. 2. Have you seen or consulted any other health care providers outside of the [facility] since your last visit? Include any pap smears or colon screening.  No

## 2018-04-18 NOTE — PROGRESS NOTES
HISTORY OF PRESENTING ILLNESS      Shazia Madison is a 78 y.o. male with history of hypertension, dyslipidemia and palpitations. Previous EKGs have demonstrated sinus rhythm with PACs and PVCs. He underwent a 24-hour Holter monitor which demonstrated PACs which were nonconducted at times resulting in compensatory pauses. He presented to the emergency room recently with sustained palpitations. Is currently on metoprolol 25 mg daily. He reports experiencing fatigue since being on the metoprolol. He is expands palpitations throughout the day whether taking metoprolol in the evening or in the morning. Denies syncope. ACTIVE PROBLEM LIST     Patient Active Problem List    Diagnosis Date Noted    Tinnitus of left ear 01/30/2018    Essential hypertension 01/30/2018    Pure hypercholesterolemia 01/30/2018           PAST MEDICAL HISTORY     Past Medical History:   Diagnosis Date    Diverticulitis     High cholesterol     Hyperlipidemia     Hypertension     Irregular heart beat     Tinnitus            PAST SURGICAL HISTORY     No past surgical history on file. ALLERGIES     Allergies   Allergen Reactions    Losartan Other (comments)     Shaking, feeling unbalanced.            FAMILY HISTORY     Family History   Problem Relation Age of Onset    Hypertension Mother    [facility] Stroke Father     Diabetes Sister     Hypertension Sister     negative for cardiac disease       SOCIAL HISTORY     Social History     Social History    Marital status: SINGLE     Spouse name: N/A    Number of children: N/A    Years of education: N/A     Social History Main Topics    Smoking status: Never Smoker    Smokeless tobacco: Never Used    Alcohol use 1.8 - 2.4 oz/week     3 - 4 Standard drinks or equivalent per week      Comment: socially    Drug use: No    Sexual activity: No     Other Topics Concern    Not on file     Social History Narrative    ** Merged History Encounter ** MEDICATIONS     Current Outpatient Prescriptions   Medication Sig    atorvastatin (LIPITOR) 10 mg tablet TAKE 1 TABLET BY MOUTH EVERY DAY    lisinopril (PRINIVIL, ZESTRIL) 10 mg tablet TAKE 1 TABLET BY MOUTH ONCE A DAY    metoprolol succinate (TOPROL-XL) 25 mg XL tablet Take 25 mg by mouth daily.  aspirin delayed-release 81 mg tablet Take  by mouth daily.  multivitamin, tx-iron-ca-min (THERA-M W/ IRON) 9 mg iron-400 mcg tab tablet Take 1 Tab by mouth daily.  BIOFLAV,LEMON/VIT BCOMP,C (LIPOFLAVONOID PO) Take 1 Tab by mouth once over twenty-four (24) hours.  ALPRAZolam (XANAX) 0.25 mg tablet Take 1 Tab by mouth every eight (8) hours as needed for Anxiety. Max Daily Amount: 0.75 mg. No current facility-administered medications for this visit. I have reviewed the nurses notes, vitals, problem list, allergy list, medical history, family, social history and medications. REVIEW OF SYMPTOMS      General: Pt denies excessive weight gain or loss. Pt is able to conduct ADL's  HEENT: Denies blurred vision, headaches, hearing loss, epistaxis and difficulty swallowing. Respiratory: Denies cough, congestion, shortness of breath, ALANIS, wheezing or stridor. Cardiovascular: Denies precordial pain, palpitations, edema or PND  Gastrointestinal: Denies poor appetite, indigestion, abdominal pain or blood in stool  Genitourinary: Denies hematuria, dysuria, increased urinary frequency  Musculoskeletal: Denies joint pain or swelling from muscles or joints  Neurologic: Denies tremor, paresthesias, headache, or sensory motor disturbance  Psychiatric: Denies confusion, insomnia, depression  Integumentray: Denies rash, itching or ulcers. Hematologic: Denies easy bruising, bleeding       PHYSICAL EXAMINATION      There were no vitals filed for this visit. General: Well developed, in no acute distress.   HEENT: No jaundice, oral mucosa moist, no oral ulcers  Neck: Supple, no stiffness, no lymphadenopathy, supple  Heart:  Normal S1/S2 negative S3 or S4. Regular, no murmur, gallop or rub, no jugular venous distention  Respiratory: Clear bilaterally x 4, no wheezing or rales  Abdomen:   Soft, non-tender, bowel sounds are active.   Extremities:  No edema, normal cap refill, no cyanosis. Musculoskeletal: No clubbing, no deformities  Neuro: A&Ox3, speech clear, gait stable, cooperative, no focal neurologic deficits  Skin: Skin color is normal. No rashes or lesions. Non diaphoretic, moist.  Vascular: 2+ pulses symmetric in all extremities       DIAGNOSTIC DATA      EKG:        LABORATORY DATA      Lab Results   Component Value Date/Time    WBC 3.5 (L) 03/31/2018 07:33 AM    Hemoglobin (POC) 15.6 12/03/2017 10:40 AM    HGB 14.0 03/31/2018 07:33 AM    Hematocrit (POC) 46 12/03/2017 10:40 AM    HCT 41.6 03/31/2018 07:33 AM    PLATELET 000 28/09/0475 07:33 AM    MCV 92.2 03/31/2018 07:33 AM      Lab Results   Component Value Date/Time    Sodium 142 03/31/2018 07:33 AM    Potassium 3.6 03/31/2018 07:33 AM    Chloride 106 03/31/2018 07:33 AM    CO2 25 03/31/2018 07:33 AM    Anion gap 11 03/31/2018 07:33 AM    Glucose 96 03/31/2018 07:33 AM    BUN 16 03/31/2018 07:33 AM    Creatinine 1.24 03/31/2018 07:33 AM    BUN/Creatinine ratio 13 03/31/2018 07:33 AM    GFR est AA >60 03/31/2018 07:33 AM    GFR est non-AA 57 (L) 03/31/2018 07:33 AM    Calcium 9.0 03/31/2018 07:33 AM    Bilirubin, total 0.6 03/31/2018 07:33 AM    AST (SGOT) 31 03/31/2018 07:33 AM    Alk. phosphatase 72 03/31/2018 07:33 AM    Protein, total 7.4 03/31/2018 07:33 AM    Albumin 3.7 03/31/2018 07:33 AM    Globulin 3.7 03/31/2018 07:33 AM    A-G Ratio 1.0 (L) 03/31/2018 07:33 AM    ALT (SGPT) 38 03/31/2018 07:33 AM           ASSESSMENT      1. Premature atrial contractions   A. Palpitations   B. Nonconducted at times with compensatory pause  2. Hypertension  3. Dyslipidemia  4. PVCs         PLAN     Patient has compensatory pauses due to nonconducted PACs.   He is symptomatic with these as well. Will escalate drug therapy regimen and attempt to suppress these PACs and resultant palpitations and pauses. He is poorly tolerant of metoprolol. Discontinue metoprolol. Start diltiazem 180 mg daily. Repeat Holter monitor in 1 week. FOLLOW-UP     After Holter completed      Thank you, Taylor Covington MD and Dr. Jeffry Kuo for allowing me to participate in the care of this extraordinarily pleasant male. Please do not hesitate to contact me for further questions/concerns.          Marvin Fairchild MD  Cardiac Electrophysiology / Cardiology    [address] [place] [address]  [phone] / [phone] Fax   [phone] / [phone] Fax

## 2018-04-18 NOTE — TELEPHONE ENCOUNTER
Eder Harrison called stating pt needs the following 3 medications filled and needs 90 day supplies for insurance savings sent to [facility]

## 2018-04-18 NOTE — PATIENT INSTRUCTIONS
Treatment Plan:  Discontinue metoprolol. Start diltiazem 180mg daily. Repeat Holtor monitor in 1 week. Follow up pending results of holter monitoring. Holter Monitoring: About This Test  What is it? A Holter monitor is a small machine that records the electrical activity of your heart. You wear it for 24 to 48 hours while you do all your normal activities. The monitor has wires that attach to small electrode pads. These pads are taped to your chest.  This kind of machine has many different names. It is sometimes called an ambulatory monitor, an ambulatory electrocardiogram, or an ambulatory EKG. It can also be called a 24-hour EKG or a cardiac event monitor. Why is this test done? You may have this test to find out if you have a problem with your heart. Many heart problems can only be noticed when you are doing something. They may happen when you exercise, eat, have sex, or sleep. Or they may happen when you have a bowel movement or you feel stressed. Your Holter monitor will record the way your heart beats during all of these activities. Holter monitoring also will:  · Look for what may cause chest pain, dizziness, or fainting. · Check to see if treatment for an irregular heartbeat is working. How can you prepare for the test?  · Before the test, talk to your doctor about all your health problems. Tell him or her about all the medicines and vitamins you take. · Take a shower or bath before the pads are put onto your chest. You must not get the pads wet during the test.  · Wear a loose blouse or shirt. · Do not wear jewelry or clothes with metal buttons or daniel. · If you are a woman, do not wear an underwire bra. What happens before the test?  · Areas of your chest may be shaved and cleaned. · The electrode pads are attached to your chest with a paste or gel. · You wear the monitor on a strap over your shoulder or around your waist. It uses batteries and does not weigh much.   What happens during the test?  · You need to record your activities and symptoms. You will write down the time your symptoms started. And you will write down what type of activity you were doing. · You can use the clock on the monitor to help you keep track of the time your symptoms started. · When you sleep, try to stay on your back with the monitor at your side. This will prevent the pads from coming off. What else should you know about the test?  · When you wear the monitor, try to stay away from magnets, metal detectors, high-voltage areas, garage door openers, microwave ovens, and electric blankets. · Do not use an electric toothbrush or shaver. · The pads may make your skin itch a little. And your skin may look or feel irritated after the pads are removed. How long does the test take? · You usually wear the monitor for 24 to 48 hours. What happens after the test?  · You may return to the doctor's office or hospital to have the pads removed. Or you may be able to take them off yourself. · You will return the Holter monitor to your doctor's office or hospital.  · You can go back to your usual activities right away. Where can you learn more? Go to http://prabhjot-willie.info/. Enter A075 in the search box to learn more about \"Holter Monitoring: About This Test.\"  Current as of: September 21, 2016  Content Version: 11.4  © 8715-1773 [employer]. Care instructions adapted under license by [employer] (which disclaims liability or warranty for this information). If you have questions about a medical condition or this instruction, always ask your healthcare professional. [place] [address] any warranty or liability for your use of this information.

## 2018-04-26 ENCOUNTER — TELEPHONE (OUTPATIENT)
Dept: CARDIOLOGY CLINIC | Age: 78
End: 2018-04-26

## 2018-04-26 ENCOUNTER — OFFICE VISIT (OUTPATIENT)
Dept: FAMILY MEDICINE CLINIC | Age: 78
End: 2018-04-26

## 2018-04-26 VITALS
OXYGEN SATURATION: 96 % | SYSTOLIC BLOOD PRESSURE: 140 MMHG | WEIGHT: 192.2 LBS | BODY MASS INDEX: 29.13 KG/M2 | RESPIRATION RATE: 20 BRPM | HEART RATE: 74 BPM | DIASTOLIC BLOOD PRESSURE: 80 MMHG | TEMPERATURE: 97.9 F | HEIGHT: 68 IN

## 2018-04-26 DIAGNOSIS — R20.0 NUMBNESS AND TINGLING IN RIGHT HAND: Primary | ICD-10-CM

## 2018-04-26 DIAGNOSIS — T50.905A MEDICATION SIDE EFFECT, INITIAL ENCOUNTER: ICD-10-CM

## 2018-04-26 DIAGNOSIS — R20.2 NUMBNESS AND TINGLING IN RIGHT HAND: Primary | ICD-10-CM

## 2018-04-26 RX ORDER — DOXYCYCLINE 100 MG/1
CAPSULE ORAL
Refills: 0 | COMMUNITY
Start: 2018-04-16 | End: 2018-06-20 | Stop reason: ALTCHOICE

## 2018-04-26 NOTE — PROGRESS NOTES
Subjective:   Patricio Matute is a 78 y.o. male with history of hypertension, dyslipidemia and palpitations. He recently was seen by Dr. Casandra Colin and because of previous EKG which demon started PACs and PVCs. He underwent a 24-hour Holter monitor which demonstrated PACs which were nonconducted at times resulting in compensatory pauses. He presented to the emergency room recently with sustained palpitations. He had been in Metoprolol and he reported that it was causing him fatigued. So Dr. Casandra Colin changed him to Diltiazem. Now, he reports that the Diltiazem is causing him fatigue and sweating at night. Plus he reports that he has headaches in the morning. The patient seems to have intolerance to several medications. Especially BP meds. He has c/o Losartan in the past and then metoprolol and now Diltiazem. He has not been having any of the palpitations. He has not discussed the issues with his cardiologist.      Mr. Delfin Cabrera also expresses that he is having tingling and numbness in the fingers of his right hand. This started about 2-weeks ago. No trauma or injuries. It could be related to the Diltiazem as well. He has also noted swelling in his ankles and hands. Calcium channel blockers are well documented in causing some of these symptoms. In general, he may not be tolerating this. However, he has not had any recurrent palpitations. Past Medical History:  Patient Active Problem List    Diagnosis Date Noted    Tinnitus of left ear 01/30/2018    Essential hypertension 01/30/2018    Pure hypercholesterolemia 01/30/2018       Medications:  Current Outpatient Prescriptions   Medication Sig Dispense Refill    doxycycline (VIBRAMYCIN) 100 mg capsule TAKE ONE CAPSULE BY MOUTH EVERY 12 HOURS UNTIL FINISHED  0    dilTIAZem CD (CARDIZEM CD) 180 mg ER capsule Take 1 Cap by mouth daily.  30 Cap 3    atorvastatin (LIPITOR) 10 mg tablet TAKE 1 TABLET BY MOUTH EVERY DAY 90 Tab 0    lisinopril (PRINIVIL, ZESTRIL) 10 mg tablet TAKE 1 TABLET BY MOUTH ONCE A DAY 90 Tab 0    aspirin delayed-release 81 mg tablet Take  by mouth daily.  multivitamin, tx-iron-ca-min (THERA-M W/ IRON) 9 mg iron-400 mcg tab tablet Take 1 Tab by mouth daily.  BIOFLAV,LEMON/VIT BCOMP,C (LIPOFLAVONOID PO) Take 1 Tab by mouth once over twenty-four (24) hours.  ALPRAZolam (XANAX) 0.25 mg tablet Take 1 Tab by mouth every eight (8) hours as needed for Anxiety. Max Daily Amount: 0.75 mg. 12 Tab 0       Allergies: Allergies   Allergen Reactions    Losartan Other (comments)     Shaking, feeling unbalanced. Past Medical History:  Past Medical History:   Diagnosis Date    Diverticulitis     High cholesterol     Hyperlipidemia     Hypertension     Irregular heart beat     Tinnitus        Past Surgical History:   History reviewed. No pertinent surgical history. Family History   Problem Relation Age of Onset   Gume Gupta Hypertension Mother     Stroke Father     Diabetes Sister     Hypertension Sister      Social History:  Social History   Substance Use Topics    Smoking status: Never Smoker    Smokeless tobacco: Never Used    Alcohol use 1.8 - 2.4 oz/week     3 - 4 Standard drinks or equivalent per week      Comment: socially      Review of Systems  Pertinent items are noted in HPI. Objective:     /80  Pulse 74  Temp 97.9 °F (36.6 °C) (Oral)   Resp 20  Ht 5' 8\" (1.727 m)  Wt 192 lb 3.2 oz (87.2 kg)  SpO2 96%  BMI 29.22 kg/m2  [facility] exam: normal rate, regular rhythm, normal S1, S2, no murmurs, rubs, clicks or gallops, S1 and S2 normal.  Chest: clear to auscultation, no wheezes, rales or rhonchi, symmetric air entry. Exam of extremities: mild non-pitting edema of the R>>L leg to lower ankle. Also, + Tinel's testing of R-hand. Assessment/Plan:       ICD-10-CM ICD-9-CM    1. Numbness and tingling in right hand R20.0 782.0 VITAMIN B12 & FOLATE    R94.3  METABOLIC PANEL, COMPREHENSIVE   2.  Medication side effect, initial encounter T88. 7XXA E947.9      77M who presents with c/o intolerance to Diltiazem. I have directed him to call Dr. Markel Zhang to determine what else can be used to control his PAC's. He was getting fatigued on metoprolol and it was not keeping the PAC's controlled. In addition, he is experiencing some numbness, tingling and swelling of the R-hand and also mild non-pitting edema of the right foot. He reports that this seems to be concordant with starting the Diltiazem. Will have him stop this medication after he speaks with Dr. Markel Zhang. I have discussed the diagnosis with the patient and the intended treatment plan as seen in the above orders. The patient has received an after-visit summary and questions were answered concerning future plans. Asked to return should symptoms worsen or not improve with treatment. Any pending labs and studies will be relayed to patient when they become available. Pt verbalizes understanding of plan of care and denies further questions or concerns at this time. Follow-up Disposition:  Return if symptoms worsen or fail to improve.

## 2018-04-26 NOTE — MR AVS SNAPSHOT
[address] [facility] 
 
 
 [address] [place] 
[phone] Patient: Sukhdeep Morales MRN: [id number] Visit Information Date & Time Provider Department Dept. Phone Encounter #  
 4/26/2018  1:45 PM Quinten Ortega  Your Appointments 6/25/2018  9:30 AM  
ROUTINE CARE with Fela Mckeon MD  
[facility] Appt Note: 4 month follow up [address] [place] [address] [facility]  
  
   
 [address] Upcoming Health Maintenance Date Due DTaP/Tdap/Td series (1 - Tdap) 12/24/1963 ZOSTER VACCINE AGE 60> 10/24/2002 GLAUCOMA SCREENING Q2Y 12/24/2007 Pneumococcal 65+ Low/Medium Risk (1 of 2 - PCV13) 12/24/2007 Influenza Age 5 to Adult 8/1/2017 MEDICARE YEARLY EXAM 3/28/2018 Allergies as of 4/26/2018  Review Complete On: 4/26/2018 By: Guilherme Todd LPN Severity Noted Reaction Type Reactions Losartan  02/07/2018    Other (comments) Shaking, feeling unbalanced. Current Immunizations  Never Reviewed No immunizations on file. Not reviewed this visit Vitals BP Pulse Temp Resp Height(growth percentile) Weight(growth percentile) 150/74 (BP 1 Location: Right arm, BP Patient Position: Sitting) 74 97.9 °F (36.6 °C) (Oral) 20 5' 8\" (1.727 m) 192 lb 3.2 oz (87.2 kg) SpO2 BMI Smoking Status 96% 29.22 kg/m2 Never Smoker BMI and BSA Data Body Mass Index Body Surface Area  
 29.22 kg/m 2 2.05 m 2 Preferred Pharmacy Pharmacy Name Phone [id number] [phone], [address] [place] [address]  [phone] Your Updated Medication List  
  
   
This list is accurate as of 4/26/18  1:49 PM.  Always use your most recent med list.  
  
  
  
  
 ALPRAZolam 0.25 mg tablet Commonly known as:  Val  Take 1 Tab by mouth every eight (8) hours as needed for Anxiety. Max Daily Amount: 0.75 mg.  
  
 aspirin delayed-release 81 mg tablet Take  by mouth daily. atorvastatin 10 mg tablet Commonly known as:  LIPITOR  
TAKE 1 TABLET BY MOUTH EVERY DAY  
  
 dilTIAZem  mg ER capsule Commonly known as:  CARDIZEM CD Take 1 Cap by mouth daily. doxycycline 100 mg capsule Commonly known as:  VIBRAMYCIN  
TAKE ONE CAPSULE BY MOUTH EVERY 12 HOURS UNTIL FINISHED  
  
 LIPOFLAVONOID PO Take 1 Tab by mouth once over twenty-four (24) hours. lisinopril 10 mg tablet Commonly known as:  PRINIVIL, ZESTRIL  
TAKE 1 TABLET BY MOUTH ONCE A DAY  
  
 multivitamin, tx-iron-ca-min 9 mg iron-400 mcg Tab tablet Commonly known as:  THERA-M w/ IRON Take 1 Tab by mouth daily. Introducing hospitals & HEALTH SERVICES! [employer] introduces [employer] patient portal. Now you can access parts of your medical record, email your doctor's office, and request medication refills online. 1. In your internet browser, go to [email or web address] 2. Click on the First Time User? Click Here link in the Sign In box. You will see the New Member Sign Up page. 3. Enter your [employer] Access Code exactly as it appears below. You will not need to use this code after youve completed the sign-up process. If you do not sign up before the expiration date, you must request a new code. · [employer] Access Code: [email or web address] Expires: 4/30/2018 12:38 PM 
 
4. Enter the last four digits of your Social Security Number (xxxx) and Date of Birth (mm/dd/yyyy) as indicated and click Submit. You will be taken to the next sign-up page. 5. Create a [employer] ID. This will be your [employer] login ID and cannot be changed, so think of one that is secure and easy to remember. 6. Create a [employer] password. You can change your password at any time. 7. Enter your Password Reset Question and Answer. This can be used at a later time if you forget your password. 8. Enter your e-mail address. You will receive e-mail notification when new information is available in [address] 9. Click Sign Up. You can now view and download portions of your medical record. 10. Click the Download Summary menu link to download a portable copy of your medical information. If you have questions, please visit the Frequently Asked Questions section of the [employer] website. Remember, [employer] is NOT to be used for urgent needs. For medical emergencies, dial 911. Now available from your iPhone and Android! Please provide this summary of care documentation to your next provider. Your primary care clinician is listed as [address]. If you have any questions after today's visit, please call [phone].

## 2018-04-26 NOTE — TELEPHONE ENCOUNTER
[phone] called to discuss new bp medication that he was put on that causes his ankles to swell and that makes him very sluggish throughout the day. Pt wants to be taking off this med.   Thanks

## 2018-04-26 NOTE — PROGRESS NOTES
Identified pt with two pt identifiers(name and ). Chief Complaint   Patient presents with    Medication Evaluation     Complains of having side effects from Diltiazem. Tiredness, night sweats, and headaches        Health Maintenance Due   Topic    DTaP/Tdap/Td series (1 - Tdap)    ZOSTER VACCINE AGE 60>     GLAUCOMA SCREENING Q2Y     Pneumococcal 65+ Low/Medium Risk (1 of 2 - PCV13)    Influenza Age 5 to Adult     MEDICARE YEARLY EXAM        Wt Readings from Last 3 Encounters:   18 193 lb 9.6 oz (87.8 kg)   18 191 lb (86.6 kg)   18 192 lb 3.2 oz (87.2 kg)     Temp Readings from Last 3 Encounters:   18 98.1 °F (36.7 °C) (Oral)   18 98.3 °F (36.8 °C) (Oral)   18 98.2 °F (36.8 °C) (Oral)     BP Readings from Last 3 Encounters:   18 160/80   18 128/59   18 135/66     Pulse Readings from Last 3 Encounters:   18 72   18 63   18 82         Learning Assessment:  :     Learning Assessment 2018   PRIMARY LEARNER Patient   BARRIERS PRIMARY LEARNER VISUAL   CO-LEARNER CAREGIVER No   PRIMARY LANGUAGE ENGLISH   LEARNER PREFERENCE PRIMARY DEMONSTRATION     LISTENING     READING   ANSWERED BY patient   RELATIONSHIP SELF       Depression Screening:  :     PHQ over the last two weeks 2018   Little interest or pleasure in doing things Not at all   Feeling down, depressed or hopeless Not at all   Total Score PHQ 2 0       Fall Risk Assessment:  :     Fall Risk Assessment, last 12 mths 2018   Able to walk? Yes   Fall in past 12 months? No       Abuse Screening:  :     Abuse Screening Questionnaire 2018   Do you ever feel afraid of your partner? N   Are you in a relationship with someone who physically or mentally threatens you? N   Is it safe for you to go home? Y       Coordination of Care Questionnaire:  :     1) Have you been to an emergency room, urgent care clinic since your last visit? no   Hospitalized since your last visit? no             2) Have you seen or consulted any other health care providers outside of [facility] since your last visit? yes  (Include any pap smears or colon screenings in this section.)    3) Do you have an Advance Directive on file? no  Are you interested in receiving information about Advance Directives? no    Reviewed record in preparation for visit and have obtained necessary documentation. Medication reconciliation up to date and corrected with patient at this time. Patient complains that new medication since 4/18/18, \"has caused night sweats, headaches, feeling tired all the time. \"  Medication Dilitiazem was started by cardiologist.

## 2018-04-27 LAB
ALBUMIN SERPL-MCNC: 4.5 G/DL (ref 3.5–4.8)
ALBUMIN/GLOB SERPL: 1.6 {RATIO} (ref 1.2–2.2)
ALP SERPL-CCNC: 71 IU/L (ref 39–117)
ALT SERPL-CCNC: 26 IU/L (ref 0–44)
AST SERPL-CCNC: 30 IU/L (ref 0–40)
BILIRUB SERPL-MCNC: 0.5 MG/DL (ref 0–1.2)
BUN SERPL-MCNC: 17 MG/DL (ref 8–27)
BUN/CREAT SERPL: 14 (ref 10–24)
CALCIUM SERPL-MCNC: 9.8 MG/DL (ref 8.6–10.2)
CHLORIDE SERPL-SCNC: 102 MMOL/L (ref 96–106)
CO2 SERPL-SCNC: 24 MMOL/L (ref 18–29)
CREAT SERPL-MCNC: 1.24 MG/DL (ref 0.76–1.27)
FOLATE SERPL-MCNC: >20 NG/ML
GFR SERPLBLD CREATININE-BSD FMLA CKD-EPI: 57 ML/MIN/1.73
GFR SERPLBLD CREATININE-BSD FMLA CKD-EPI: 65 ML/MIN/1.73
GLOBULIN SER CALC-MCNC: 2.8 G/DL (ref 1.5–4.5)
GLUCOSE SERPL-MCNC: 102 MG/DL (ref 65–99)
INTERPRETATION: NORMAL
POTASSIUM SERPL-SCNC: 4.6 MMOL/L (ref 3.5–5.2)
PROT SERPL-MCNC: 7.3 G/DL (ref 6–8.5)
SODIUM SERPL-SCNC: 144 MMOL/L (ref 134–144)
VIT B12 SERPL-MCNC: 482 PG/ML (ref 232–1245)

## 2018-05-01 ENCOUNTER — TELEPHONE (OUTPATIENT)
Dept: FAMILY MEDICINE CLINIC | Age: 78
End: 2018-05-01

## 2018-05-01 NOTE — LETTER
5/2/2018 11:30 AM 
 
Mr. Juliano [address]ascual [address] [id number] Dear Juliano Pascual: 
 
Please find your most recent results below. Results for orders placed or performed in visit on 04/26/18 VITAMIN B12 & FOLATE Result Value Ref Range Vitamin B12 482 232 - 1245 pg/mL Folate >20.0 >3.0 ng/mL METABOLIC PANEL, COMPREHENSIVE Result Value Ref Range Glucose 102 (H) 65 - 99 mg/dL BUN 17 8 - 27 mg/dL Creatinine 1.24 0.76 - 1.27 mg/dL GFR est non-AA 57 (L) >59 mL/min/1.73 GFR est AA 65 >59 mL/min/1.73  
 BUN/Creatinine ratio 14 10 - 24 Sodium 144 134 - 144 mmol/L Potassium 4.6 3.5 - 5.2 mmol/L Chloride 102 96 - 106 mmol/L  
 CO2 24 18 - 29 mmol/L Calcium 9.8 8.6 - 10.2 mg/dL Protein, total 7.3 6.0 - 8.5 g/dL Albumin 4.5 3.5 - 4.8 g/dL GLOBULIN, TOTAL 2.8 1.5 - 4.5 g/dL A-G Ratio 1.6 1.2 - 2.2 Bilirubin, total 0.5 0.0 - 1.2 mg/dL Alk. phosphatase 71 39 - 117 IU/L  
 AST (SGOT) 30 0 - 40 IU/L  
 ALT (SGPT) 26 0 - 44 IU/L  
CKD REPORT Result Value Ref Range Interpretation Note RECOMMENDATIONS: 
Your labs were normal. No concerning features at this time. Vitamin B12 was also normal.  
Please follow up with cardiology about the Diltiazem. Please call me if you have any questions: [phone] Sincerely, Corey Lamb MD

## 2018-05-03 ENCOUNTER — DOCUMENTATION ONLY (OUTPATIENT)
Dept: CARDIOLOGY CLINIC | Age: 78
End: 2018-05-03

## 2018-05-03 NOTE — PROGRESS NOTES
Per Dr. Karlo Rubi, patient needs an exercise nuclear stress test.  Please cancel the stress echo and echocardiogram scheduled for 5/9 and reschedule patient for exercise nuclear stress test.

## 2018-05-18 ENCOUNTER — CLINICAL SUPPORT (OUTPATIENT)
Dept: CARDIOLOGY CLINIC | Age: 78
End: 2018-05-18

## 2018-05-18 DIAGNOSIS — E78.00 PURE HYPERCHOLESTEROLEMIA: ICD-10-CM

## 2018-05-18 DIAGNOSIS — R00.2 PALPITATIONS: Primary | ICD-10-CM

## 2018-05-18 DIAGNOSIS — I10 ESSENTIAL HYPERTENSION: ICD-10-CM

## 2018-05-18 NOTE — PROGRESS NOTES
Explained procedure to patient, Obtaining IV access, radiation exposure, risks and discomforts (for exercise stress test), waiting between injections and obtaining images. All concerns and questions addressed, prior to obtaining consent. See scanned report. Dr. Tunde Garza  ordered and Dr. April Garcia read study. ID verified per protocol. Pt  reported no symptoms at completion of protocol.

## 2018-05-23 ENCOUNTER — TELEPHONE (OUTPATIENT)
Dept: CARDIOLOGY CLINIC | Age: 78
End: 2018-05-23

## 2018-05-23 NOTE — TELEPHONE ENCOUNTER
Patient is calling in to get his stress test results.  Patient stated that he will be headed out of town this weekend for his sister's .  Phone [phone]  Clearance Scale

## 2018-05-24 NOTE — TELEPHONE ENCOUNTER
Call placed to discuss echo results with pt per VO of Dr. Cinthia Lane. Pt informed of normal echocardiogram.     Pt asking about Stress Cardiolite performed 5/18. Will forward to Dr. Cinthia Lane for any recommendations on stress testing. Pt also concerned about the ringing in his ear. Advised pt of speaking with his ENT or PCP.

## 2018-06-06 LAB
HBA1C MFR BLD HPLC: 5 %
LDL-C, EXTERNAL: 67

## 2018-06-20 ENCOUNTER — OFFICE VISIT (OUTPATIENT)
Dept: FAMILY MEDICINE CLINIC | Age: 78
End: 2018-06-20

## 2018-06-20 VITALS
HEIGHT: 68 IN | RESPIRATION RATE: 16 BRPM | DIASTOLIC BLOOD PRESSURE: 73 MMHG | WEIGHT: 193 LBS | HEART RATE: 65 BPM | BODY MASS INDEX: 29.25 KG/M2 | OXYGEN SATURATION: 97 % | SYSTOLIC BLOOD PRESSURE: 141 MMHG | TEMPERATURE: 97.9 F

## 2018-06-20 DIAGNOSIS — I10 ESSENTIAL HYPERTENSION: Primary | ICD-10-CM

## 2018-06-20 DIAGNOSIS — H93.12 TINNITUS OF LEFT EAR: ICD-10-CM

## 2018-06-20 DIAGNOSIS — F41.9 ANXIETY: ICD-10-CM

## 2018-06-20 RX ORDER — DILTIAZEM HYDROCHLORIDE 180 MG/1
1 CAPSULE, COATED, EXTENDED RELEASE ORAL
Refills: 3 | COMMUNITY
Start: 2018-04-18 | End: 2018-06-20 | Stop reason: SINTOL

## 2018-06-20 RX ORDER — METOPROLOL SUCCINATE 25 MG/1
25 TABLET, EXTENDED RELEASE ORAL DAILY
COMMUNITY
End: 2018-10-02 | Stop reason: SDUPTHER

## 2018-06-20 RX ORDER — LISINOPRIL 20 MG/1
20 TABLET ORAL DAILY
Qty: 30 TAB | Refills: 0 | Status: SHIPPED | OUTPATIENT
Start: 2018-06-20 | End: 2018-07-20

## 2018-06-20 RX ORDER — DICLOFENAC SODIUM 50 MG/1
1 TABLET, DELAYED RELEASE ORAL
Refills: 0 | COMMUNITY
Start: 2018-06-05 | End: 2018-07-31 | Stop reason: SDUPTHER

## 2018-06-20 RX ORDER — ATORVASTATIN CALCIUM 10 MG/1
TABLET, FILM COATED ORAL
Refills: 1 | COMMUNITY
Start: 2018-05-03 | End: 2018-09-11 | Stop reason: DRUGHIGH

## 2018-06-20 RX ORDER — BUSPIRONE HYDROCHLORIDE 5 MG/1
TABLET ORAL
Qty: 60 TAB | Refills: 0 | Status: SHIPPED | OUTPATIENT
Start: 2018-06-20 | End: 2018-07-17 | Stop reason: SDUPTHER

## 2018-06-20 NOTE — PROGRESS NOTES
Identified pt with two pt identifiers(name and ). Chief Complaint   Patient presents with    Hypertension     reports BP is good during the day, however, after 1pm BPs are reading systolically in the 777G - 190s    Cholesterol Problem    Other     upon review of health maintenance pt is advised he is due for [address] [place] Maintenance Due   Topic    DTaP/Tdap/Td series (1 - Tdap)    ZOSTER VACCINE AGE 60>     GLAUCOMA SCREENING Q2Y     MEDICARE YEARLY EXAM        Wt Readings from Last 3 Encounters:   18 193 lb (87.5 kg)   18 192 lb 3.2 oz (87.2 kg)   18 193 lb 9.6 oz (87.8 kg)     Temp Readings from Last 3 Encounters:   18 97.9 °F (36.6 °C) (Oral)   18 97.9 °F (36.6 °C) (Oral)   18 98.1 °F (36.7 °C) (Oral)     BP Readings from Last 3 Encounters:   18 141/73   18 140/80   18 160/80     Pulse Readings from Last 3 Encounters:   18 65   18 74   18 72         Learning Assessment:  :     Learning Assessment 2018   PRIMARY LEARNER Patient   BARRIERS PRIMARY LEARNER VISUAL   CO-LEARNER CAREGIVER No   PRIMARY LANGUAGE ENGLISH   LEARNER PREFERENCE PRIMARY DEMONSTRATION     LISTENING     READING   ANSWERED BY patient   RELATIONSHIP SELF       Depression Screening:  :     PHQ over the last two weeks 2018   Little interest or pleasure in doing things Not at all   Feeling down, depressed or hopeless Not at all   Total Score PHQ 2 0       Fall Risk Assessment:  :     Fall Risk Assessment, last 12 mths 2018   Able to walk? Yes   Fall in past 12 months? No       Abuse Screening:  :     Abuse Screening Questionnaire 2018   Do you ever feel afraid of your partner? N   Are you in a relationship with someone who physically or mentally threatens you? N   Is it safe for you to go home?  Y       Coordination of Care Questionnaire:  :     1) Have you been to an emergency room, urgent care clinic since your last visit? no   Hospitalized since your last visit? no             2) Have you seen or consulted any other health care providers outside of [address] [place] [address] since your last visit? no  (Include any pap smears or colon screenings in this section.)    3) Do you have an Advance Directive on file? no  Are you interested in receiving information about Advance Directives? no    Patient is accompanied by self. Reviewed record in preparation for visit and have obtained necessary documentation. Medication reconciliation up to date and corrected with patient at this time.

## 2018-06-20 NOTE — MR AVS SNAPSHOT
[facility] 
 
 
 [facility] [address] [facility] 
[phone] Patient: Mariama Mai MRN: [id number] MCK:[birth year] Visit Information Date & Time Provider Department Dept. Phone Encounter #  
 6/20/2018  4:00 PM Quinten Pina [id number] Upcoming Health Maintenance Date Due DTaP/Tdap/Td series (1 - Tdap) 12/24/1963 ZOSTER VACCINE AGE 60> 10/24/2002 GLAUCOMA SCREENING Q2Y 12/24/2007 MEDICARE YEARLY EXAM 6/20/2018 Influenza Age 5 to Adult 8/1/2018 Pneumococcal 65+ Low/Medium Risk (2 of 2 - PCV13) 6/4/2019 Allergies as of 6/20/2018  Review Complete On: 6/20/2018 By: Aris Perez MD  
  
 Severity Noted Reaction Type Reactions Losartan  02/07/2018    Other (comments) Shaking, feeling unbalanced. Current Immunizations  Never Reviewed Name Date Pneumococcal Polysaccharide (PPSV-23) 6/4/2018 Not reviewed this visit You Were Diagnosed With   
  
 Codes Comments Essential hypertension    -  Primary ICD-10-CM: I10 
ICD-9-CM: 401.9 Tinnitus of left ear     ICD-10-CM: H93.12 
ICD-9-CM: 388.30 Anxiety     ICD-10-CM: F41.9 ICD-9-CM: 300.00 Vitals BP Pulse Temp Resp Height(growth percentile) Weight(growth percentile) 141/73 (BP 1 Location: Right arm, BP Patient Position: Sitting) 65 97.9 °F (36.6 °C) (Oral) 16 5' 8\" (1.727 m) 193 lb (87.5 kg) SpO2 BMI Smoking Status 97% 29.35 kg/m2 Never Smoker Vitals History BMI and BSA Data Body Mass Index Body Surface Area  
 29.35 kg/m 2 2.05 m 2 Preferred Pharmacy Pharmacy Name Phone [id number] [address] [phone] Your Updated Medication List  
  
   
This list is accurate as of 6/20/18  4:50 PM.  Always use your most recent med list.  
  
  
  
  
 aspirin delayed-release 81 mg tablet Take  by mouth daily. atorvastatin 10 mg tablet Commonly known as:  LIPITOR  
TAKE 1 TABLET BY MOUTH EVERY DAY  
  
 busPIRone 5 mg tablet Commonly known as:  BUSPAR Start daily x 3 days, then 2 x daily until re-evaluated in 2-3 weeks. diclofenac EC 50 mg EC tablet Commonly known as:  VOLTAREN Take 1 Tab by mouth two (2) times daily as needed. LIPOFLAVONOID PO Take 1 Tab by mouth once over twenty-four (24) hours. lisinopril 20 mg tablet Commonly known as:  Pecola Cypher Take 1 Tab by mouth daily for 30 days. metoprolol succinate 25 mg XL tablet Commonly known as:  TOPROL-XL Take 25 mg by mouth daily. multivitamin, tx-iron-ca-min 9 mg iron-400 mcg Tab tablet Commonly known as:  THERA-M w/ IRON Take 1 Tab by mouth daily. Prescriptions Sent to Pharmacy Refills  
 lisinopril (PRINIVIL, ZESTRIL) 20 mg tablet 0 Sig: Take 1 Tab by mouth daily for 30 days. Class: Normal  
 Pharmacy: [facility] [address], [address] [place] [address] Ph #: [phone] Route: Oral  
 busPIRone (BUSPAR) 5 mg tablet 0 Sig: Start daily x 3 days, then 2 x daily until re-evaluated in 2-3 weeks. Class: Normal  
 Pharmacy: [facility]/pharmacy [address], [address] [place] [address] Ph #: [phone] Patient Instructions Tinnitus: Care Instructions Your Care Instructions Many people have some ringing sounds in their ears once in a while. You may hear a roar, a hiss, a tinkle, or a buzz. The sound usually lasts only a few minutes. If it goes on all the time, you may have tinnitus. Tinnitus is usually caused by long-term exposure to loud noise. This damages the nerves in the inner ear. It can occur with all types of hearing loss. It may be a symptom of almost any ear problem. Tinnitus may be caused by a buildup of earwax.  Or it may be caused by ear infections or certain medicines (especially antibiotics or large amounts of aspirin). You can also hear noises in your ears because of an injury to the ears, drinking too much alcohol or caffeine, or a medical condition. You may need tests to evaluate your hearing and to find causes of long-lasting tinnitus. Your doctor may suggest one or more treatments to help you cope with it. You can also do things at home to help reduce symptoms. Follow-up care is a key part of your treatment and safety. Be sure to make and go to all appointments, and call your doctor if you are having problems. It's also a good idea to know your test results and keep a list of the medicines you take. How can you care for yourself at home? · Limit or cut out alcohol and caffeine. They can make your symptoms worse. · Do not smoke or use other tobacco products. Nicotine reduces blood flow to the ear and makes tinnitus worse. If you need help quitting, talk to your doctor about stop-smoking programs and medicines. These can increase your chances of quitting for good. · Talk to your doctor about whether to stop taking aspirin and similar products such as ibuprofen or naproxen. · Get exercise often. It can improve blood flow to the ear. Ways to cope with noise Some tinnitus may last a long time. To cope with noise, try to: · Avoid noises that you think caused your tinnitus. If you can't avoid loud noises, wear earplugs or earmuffs. · Ignore the sound by paying attention to other things. · Relax using biofeedback, meditation, or yoga. Feeling stressed and being tired can make tinnitus worse. · Play music or white noise to help you sleep. Background noise may cover up the noise that you hear in your ears. You can buy a machine that makes soothing sounds, such as ocean waves. When should you call for help? Call 911 anytime you think you may need emergency care. For example, call if: 
? · You have symptoms of a stroke. These may include: ¨ Sudden numbness, tingling, weakness, or loss of movement in your face, arm, or leg, especially on only one side of your body. ¨ Sudden vision changes. ¨ Sudden trouble speaking. ¨ Sudden confusion or trouble understanding simple statements. ¨ Sudden problems with walking or balance. ¨ A sudden, severe headache that is different from past headaches. ?Call your doctor now or seek immediate medical care if: 
? · You develop other symptoms. These may include hearing loss (or worse hearing loss), balance problems, dizziness, nausea, or vomiting. ? Watch closely for changes in your health, and be sure to contact your doctor if: 
? · Your tinnitus moves from both ears to one ear. ? · Your hearing loss gets worse within 1 day after an ear injury. ? · Your tinnitus or hearing loss does not get better within 1 week after an ear injury. ? · Your tinnitus bothers you enough that you want to take medicines to help you cope with it. Where can you learn more? Go to http://prabhjot-willie.info/. Enter S165 in the search box to learn more about \"Tinnitus: Care Instructions. \" Current as of: May 12, 2017 Content Version: 11.4 © 2621-5961 [employer]. Care instructions adapted under license by [employer] (which disclaims liability or warranty for this information). If you have questions about a medical condition or this instruction, always ask your healthcare professional. [place] [address] any warranty or liability for your use of this information. Anxiety Disorder: Care Instructions Your Care Instructions Anxiety is a normal reaction to stress. Difficult situations can cause you to have symptoms such as sweaty palms and a nervous feeling. In an anxiety disorder, the symptoms are far more severe.  Constant worry, muscle tension, trouble sleeping, nausea and diarrhea, and other symptoms can make normal daily activities difficult or impossible. These symptoms may occur for no reason, and they can affect your work, school, or social life. Medicines, counseling, and self-care can all help. Follow-up care is a key part of your treatment and safety. Be sure to make and go to all appointments, and call your doctor if you are having problems. It's also a good idea to know your test results and keep a list of the medicines you take. How can you care for yourself at home? · Take medicines exactly as directed. Call your doctor if you think you are having a problem with your medicine. · Go to your counseling sessions and follow-up appointments. · Recognize and accept your anxiety. Then, when you are in a situation that makes you anxious, say to yourself, \"This is not an emergency. I feel uncomfortable, but I am not in danger. I can keep going even if I feel anxious. \" · Be kind to your body: ¨ Relieve tension with exercise or a massage. ¨ Get enough rest. 
¨ Avoid alcohol, caffeine, nicotine, and illegal drugs. They can increase your anxiety level and cause sleep problems. ¨ Learn and do relaxation techniques. See below for more about these techniques. · Engage your mind. Get out and do something you enjoy. Go to a funny movie, or take a walk or hike. Plan your day. Having too much or too little to do can make you anxious. · Keep a record of your symptoms. Discuss your fears with a good friend or family member, or join a support group for people with similar problems. Talking to others sometimes relieves stress. · Get involved in social groups, or volunteer to help others. Being alone sometimes makes things seem worse than they are. · Get at least 30 minutes of exercise on most days of the week to relieve stress. Walking is a good choice. You also may want to do other activities, such as running, swimming, cycling, or playing tennis or team sports. Relaxation techniques Do relaxation exercises 10 to 20 minutes a day. You can play soothing, relaxing music while you do them, if you wish. · Tell others in your house that you are going to do your relaxation exercises. Ask them not to disturb you. · Find a comfortable place, away from all distractions and noise. · Lie down on your back, or sit with your back straight. · Focus on your breathing. Make it slow and steady. · Breathe in through your nose. Breathe out through either your nose or mouth. · Breathe deeply, filling up the area between your navel and your rib cage. Breathe so that your belly goes up and down. · Do not hold your breath. · Breathe like this for 5 to 10 minutes. Notice the feeling of calmness throughout your whole body. As you continue to breathe slowly and deeply, relax by doing the following for another 5 to 10 minutes: · Tighten and relax each muscle group in your body. You can begin at your toes and work your way up to your head. · Imagine your muscle groups relaxing and becoming heavy. · Empty your mind of all thoughts. · Let yourself relax more and more deeply. · Become aware of the state of calmness that surrounds you. · When your relaxation time is over, you can bring yourself back to alertness by moving your fingers and toes and then your hands and feet and then stretching and moving your entire body. Sometimes people fall asleep during relaxation, but they usually wake up shortly afterward. · Always give yourself time to return to full alertness before you drive a car or do anything that might cause an accident if you are not fully alert. Never play a relaxation tape while you drive a car. When should you call for help? Call 911 anytime you think you may need emergency care. For example, call if: 
? · You feel you cannot stop from hurting yourself or someone else. ? Keep the numbers for these national suicide hotlines: 1-793-701-TALK (4-509-931-915-576-0511) and 3-580-EXMRVQR (3-522-974-605-569-0075). If you or someone you know talks about suicide or feeling hopeless, get help right away. ? Watch closely for changes in your health, and be sure to contact your doctor if: 
? · You have anxiety or fear that affects your life. ? · You have symptoms of anxiety that are new or different from those you had before. Where can you learn more? Go to http://prabhjot-willie.info/. Enter P754 in the search box to learn more about \"Anxiety Disorder: Care Instructions. \" Current as of: May 12, 2017 Content Version: 11.4 © 3638-6734 [employer]. Care instructions adapted under license by [employer] (which disclaims liability or warranty for this information). If you have questions about a medical condition or this instruction, always ask your healthcare professional. [address] any warranty or liability for your use of this information. Introducing [facility]! [facility] introduces Viblio patient portal. Now you can access parts of your medical record, email your doctor's office, and request medication refills online. 1. In your internet browser, go to [email or web address] 2. Click on the First Time User? Click Here link in the Sign In box. You will see the New Member Sign Up page. 3. Enter your Viblio Access Code exactly as it appears below. You will not need to use this code after youve completed the sign-up process. If you do not sign up before the expiration date, you must request a new code. · Viblio Access Code: [email or web address] Expires: 9/18/2018  4:50 PM 
 
4. Enter the last four digits of your Social Security Number (xxxx) and Date of Birth (mm/dd/yyyy) as indicated and click Submit. You will be taken to the next sign-up page. 5. Create a Viblio ID.  This will be your Viblio login ID and cannot be changed, so think of one that is secure and easy to remember. 6. Create a Instaclustr password. You can change your password at any time. 7. Enter your Password Reset Question and Answer. This can be used at a later time if you forget your password. 8. Enter your e-mail address. You will receive e-mail notification when new information is available in [address] 9. Click Sign Up. You can now view and download portions of your medical record. 10. Click the Download Summary menu link to download a portable copy of your medical information. If you have questions, please visit the Frequently Asked Questions section of the Instaclustr website. Remember, Instaclustr is NOT to be used for urgent needs. For medical emergencies, dial 911. Now available from your iPhone and Android! Please provide this summary of care documentation to your next provider. Your primary care clinician is listed as [address]. If you have any questions after today's visit, please call [phone].

## 2018-06-20 NOTE — PATIENT INSTRUCTIONS
Tinnitus: Care Instructions  Your Care Instructions    Many people have some ringing sounds in their ears once in a while. You may hear a roar, a hiss, a tinkle, or a buzz. The sound usually lasts only a few minutes. If it goes on all the time, you may have tinnitus. Tinnitus is usually caused by long-term exposure to loud noise. This damages the nerves in the inner ear. It can occur with all types of hearing loss. It may be a symptom of almost any ear problem. Tinnitus may be caused by a buildup of earwax. Or it may be caused by ear infections or certain medicines (especially antibiotics or large amounts of aspirin). You can also hear noises in your ears because of an injury to the ears, drinking too much alcohol or caffeine, or a medical condition. You may need tests to evaluate your hearing and to find causes of long-lasting tinnitus. Your doctor may suggest one or more treatments to help you cope with it. You can also do things at home to help reduce symptoms. Follow-up care is a key part of your treatment and safety. Be sure to make and go to all appointments, and call your doctor if you are having problems. It's also a good idea to know your test results and keep a list of the medicines you take. How can you care for yourself at home? · Limit or cut out alcohol and caffeine. They can make your symptoms worse. · Do not smoke or use other tobacco products. Nicotine reduces blood flow to the ear and makes tinnitus worse. If you need help quitting, talk to your doctor about stop-smoking programs and medicines. These can increase your chances of quitting for good. · Talk to your doctor about whether to stop taking aspirin and similar products such as ibuprofen or naproxen. · Get exercise often. It can improve blood flow to the ear. Ways to cope with noise  Some tinnitus may last a long time. To cope with noise, try to:  · Avoid noises that you think caused your tinnitus.  If you can't avoid loud noises, wear earplugs or earmuffs. · Ignore the sound by paying attention to other things. · Relax using biofeedback, meditation, or yoga. Feeling stressed and being tired can make tinnitus worse. · Play music or white noise to help you sleep. Background noise may cover up the noise that you hear in your ears. You can buy a machine that makes soothing sounds, such as ocean waves. When should you call for help? Call 911 anytime you think you may need emergency care. For example, call if:  ? · You have symptoms of a stroke. These may include:  ¨ Sudden numbness, tingling, weakness, or loss of movement in your face, arm, or leg, especially on only one side of your body. ¨ Sudden vision changes. ¨ Sudden trouble speaking. ¨ Sudden confusion or trouble understanding simple statements. ¨ Sudden problems with walking or balance. ¨ A sudden, severe headache that is different from past headaches. ?Call your doctor now or seek immediate medical care if:  ? · You develop other symptoms. These may include hearing loss (or worse hearing loss), balance problems, dizziness, nausea, or vomiting. ? Watch closely for changes in your health, and be sure to contact your doctor if:  ? · Your tinnitus moves from both ears to one ear. ? · Your hearing loss gets worse within 1 day after an ear injury. ? · Your tinnitus or hearing loss does not get better within 1 week after an ear injury. ? · Your tinnitus bothers you enough that you want to take medicines to help you cope with it. Where can you learn more? Go to http://prabhjot-willie.info/. Enter S165 in the search box to learn more about \"Tinnitus: Care Instructions. \"  Current as of: May 12, 2017  Content Version: 11.4  © 0635-5860 [employer]. Care instructions adapted under license by [employer] (which disclaims liability or warranty for this information).  If you have questions about a medical condition or this instruction, always ask your healthcare professional. [address] any warranty or liability for your use of this information. Anxiety Disorder: Care Instructions  Your Care Instructions    Anxiety is a normal reaction to stress. Difficult situations can cause you to have symptoms such as sweaty palms and a nervous feeling. In an anxiety disorder, the symptoms are far more severe. Constant worry, muscle tension, trouble sleeping, nausea and diarrhea, and other symptoms can make normal daily activities difficult or impossible. These symptoms may occur for no reason, and they can affect your work, school, or social life. Medicines, counseling, and self-care can all help. Follow-up care is a key part of your treatment and safety. Be sure to make and go to all appointments, and call your doctor if you are having problems. It's also a good idea to know your test results and keep a list of the medicines you take. How can you care for yourself at home? · Take medicines exactly as directed. Call your doctor if you think you are having a problem with your medicine. · Go to your counseling sessions and follow-up appointments. · Recognize and accept your anxiety. Then, when you are in a situation that makes you anxious, say to yourself, \"This is not an emergency. I feel uncomfortable, but I am not in danger. I can keep going even if I feel anxious. \"  · Be kind to your body:  ¨ Relieve tension with exercise or a massage. ¨ Get enough rest.  ¨ Avoid alcohol, caffeine, nicotine, and illegal drugs. They can increase your anxiety level and cause sleep problems. ¨ Learn and do relaxation techniques. See below for more about these techniques. · Engage your mind. Get out and do something you enjoy. Go to a funny movie, or take a walk or hike. Plan your day. Having too much or too little to do can make you anxious. · Keep a record of your symptoms.  Discuss your fears with a good friend or family member, or join a support group for people with similar problems. Talking to others sometimes relieves stress. · Get involved in social groups, or volunteer to help others. Being alone sometimes makes things seem worse than they are. · Get at least 30 minutes of exercise on most days of the week to relieve stress. Walking is a good choice. You also may want to do other activities, such as running, swimming, cycling, or playing tennis or team sports. Relaxation techniques  Do relaxation exercises 10 to 20 minutes a day. You can play soothing, relaxing music while you do them, if you wish. · Tell others in your house that you are going to do your relaxation exercises. Ask them not to disturb you. · Find a comfortable place, away from all distractions and noise. · Lie down on your back, or sit with your back straight. · Focus on your breathing. Make it slow and steady. · Breathe in through your nose. Breathe out through either your nose or mouth. · Breathe deeply, filling up the area between your navel and your rib cage. Breathe so that your belly goes up and down. · Do not hold your breath. · Breathe like this for 5 to 10 minutes. Notice the feeling of calmness throughout your whole body. As you continue to breathe slowly and deeply, relax by doing the following for another 5 to 10 minutes:  · Tighten and relax each muscle group in your body. You can begin at your toes and work your way up to your head. · Imagine your muscle groups relaxing and becoming heavy. · Empty your mind of all thoughts. · Let yourself relax more and more deeply. · Become aware of the state of calmness that surrounds you. · When your relaxation time is over, you can bring yourself back to alertness by moving your fingers and toes and then your hands and feet and then stretching and moving your entire body. Sometimes people fall asleep during relaxation, but they usually wake up shortly afterward.   · Always give yourself time to return to full alertness before you drive a car or do anything that might cause an accident if you are not fully alert. Never play a relaxation tape while you drive a car. When should you call for help? Call 911 anytime you think you may need emergency care. For example, call if:  ? · You feel you cannot stop from hurting yourself or someone else. ? Keep the numbers for these national suicide hotlines: 1-945-668-TALK (6-383.105.9829) and 0-314-GLCNCJJ (6-740.375.6172). If you or someone you know talks about suicide or feeling hopeless, get help right away. ? Watch closely for changes in your health, and be sure to contact your doctor if:  ? · You have anxiety or fear that affects your life. ? · You have symptoms of anxiety that are new or different from those you had before. Where can you learn more? Go to http://prabhjot-willie.info/. Enter P754 in the search box to learn more about \"Anxiety Disorder: Care Instructions. \"  Current as of: May 12, 2017  Content Version: 11.4  © 6993-4376 [employer]. Care instructions adapted under license by [employer] (which disclaims liability or warranty for this information). If you have questions about a medical condition or this instruction, always ask your healthcare professional. [address] any warranty or liability for your use of this information.

## 2018-06-20 NOTE — PROGRESS NOTES
CC:  Chief Complaint   Patient presents with    Hypertension     reports BP is good during the day, however, after 1pm BPs are reading systolically in the 176K - 190s    Cholesterol Problem    Other     upon review of health maintenance pt is advised he is due for [address] - pt reports this was done at [address] [place] [address]:     77M with h/o HTN, HLD, palpitations, anxiety and tinnitus. He is here for follow up and is concerned that his BP is not being well controlled on current meds. He notes that he is checking his BP several times per day. In the afternoons, his BP's are elevated and he gets anxious about this. In general, he has baseline anxiety that has been worsening over the past several weeks. With further discussion, the patient discusses that he has not been happy with his move to [place] on a personal level. He does not have a lot of friends in the area and is having some difficulty adjusting to the place. He has a room mate, but they are not close friends. Financially, he is stable, but would like to be more secure. He endorses worsening anxiety. At one point, he had been on Xanax, but due to escalating use and concern about dependency, this medication was was weaned. Recently, he has had some difficulty with palpitations and getting a good fit for his BP medications. He has been seen by  [facility]. Had normal Cardiolite stress test recently. Patient Active Problem List    Diagnosis Date Noted    Tinnitus of left ear 01/30/2018    Essential hypertension 01/30/2018    Pure hypercholesterolemia 01/30/2018     Current Outpatient Prescriptions   Medication Sig Dispense Refill    metoprolol succinate (TOPROL-XL) 25 mg XL tablet Take 25 mg by mouth daily.  lisinopril (PRINIVIL, ZESTRIL) 20 mg tablet Take 1 Tab by mouth daily for 30 days. 30 Tab 0    busPIRone (BUSPAR) 5 mg tablet Start daily x 3 days, then 2 x daily until re-evaluated in 2-3 weeks.  60 Tab 0    aspirin delayed-release 81 mg tablet Take  by mouth daily.  multivitamin, tx-iron-ca-min (THERA-M W/ IRON) 9 mg iron-400 mcg tab tablet Take 1 Tab by mouth daily.  BIOFLAV,LEMON/VIT BCOMP,C (LIPOFLAVONOID PO) Take 1 Tab by mouth once over twenty-four (24) hours.  diclofenac EC (VOLTAREN) 50 mg EC tablet Take 1 Tab by mouth two (2) times daily as needed. 0    atorvastatin (LIPITOR) 10 mg tablet TAKE 1 TABLET BY MOUTH EVERY DAY  1     Allergies   Allergen Reactions    Losartan Other (comments)     Shaking, feeling unbalanced. Past Medical History:   Diagnosis Date    Diverticulitis     High cholesterol     Hyperlipidemia     Hypertension     Irregular heart beat     Tinnitus      History reviewed. No pertinent surgical history. Family History   Problem Relation Age of Onset    Hypertension Mother     Stroke Father     Diabetes Sister     Hypertension Sister      Social History   Substance Use Topics    Smoking status: Never Smoker    Smokeless tobacco: Never Used    Alcohol use 1.8 - 2.4 oz/week     3 - 4 Standard drinks or equivalent per week      Comment: socially        Review of Systems  Pertinent items are noted in HPI. Objective:     Visit Vitals    /73 (BP 1 Location: Right arm, BP Patient Position: Sitting)    Pulse 65    Temp 97.9 °F (36.6 °C) (Oral)    Resp 16    Ht 5' 8\" (1.727 m)    Wt 193 lb (87.5 kg)    SpO2 97%    BMI 29.35 kg/m2      General appearance: alert, cooperative, appears stated age, and mildly anxious. Lungs: clear to auscultation bilaterally  Heart: regular rate and rhythm, S1, S2 normal, no murmur, click, rub or gallop  Extremities: extremities normal, atraumatic, no cyanosis or edema  Skin: Skin color, texture, turgor normal. No rashes or lesions  Neurologic: Grossly normal    Assessment/Plan:       ICD-10-CM ICD-9-CM    1. Essential hypertension I10 401.9 Stable. Improved. DASH diet, stress reduction recommended.     2. Tinnitus of left ear H93.12 388.30 Long term has seen ENT. No further treatment is recommended. 3. Anxiety F41.9 300.00 busPIRone (BUSPAR) 5 mg tablet     77M with HTN, Palpitations and clearly has increasing anxiety. Will start him on Buspar and follow carefully. We discussed relaxation techniques. Encouraged to get involved with community groups like [employer] or gym. · Patient Education:  Reviewed concept of anxiety as biochemical imbalance of neurotransmitters and rationale for treatment. Instructed patient to contact office or on-call physician promptly should condition worsen or any new symptoms appear and provided on-call telephone numbers. IF THE PATIENT HAS ANY SUICIDAL OR HOMICIDAL IDEATION, CALL THE OFFICE, DISCUSS WITH A SUPPORT MEMBER OR GO TO THE ER IMMEDIATELY. Patient was agreeable with this plan. I have discussed the diagnosis with the patient and the intended treatment plan as seen in the above orders. The patient has received an after-visit summary and questions were answered concerning future plans. Asked to return should symptoms worsen or not improve with treatment. Any pending labs and studies will be relayed to patient when they become available. Pt verbalizes understanding of plan of care and denies further questions or concerns at this time. Follow-up Disposition:  Return in about 1 month (around 7/20/2018), or if symptoms worsen or fail to improve.

## 2018-07-09 RX ORDER — LISINOPRIL 10 MG/1
TABLET ORAL
Qty: 90 TAB | Refills: 0 | Status: SHIPPED | OUTPATIENT
Start: 2018-07-09 | End: 2018-07-31 | Stop reason: SDUPTHER

## 2018-07-17 DIAGNOSIS — F41.9 ANXIETY: ICD-10-CM

## 2018-07-17 RX ORDER — BUSPIRONE HYDROCHLORIDE 5 MG/1
TABLET ORAL
Qty: 60 TAB | Refills: 0 | Status: SHIPPED | OUTPATIENT
Start: 2018-07-17 | End: 2018-09-05 | Stop reason: DRUGHIGH

## 2018-07-31 ENCOUNTER — OFFICE VISIT (OUTPATIENT)
Dept: FAMILY MEDICINE CLINIC | Age: 78
End: 2018-07-31

## 2018-07-31 VITALS
DIASTOLIC BLOOD PRESSURE: 86 MMHG | RESPIRATION RATE: 16 BRPM | WEIGHT: 193 LBS | OXYGEN SATURATION: 98 % | HEART RATE: 78 BPM | TEMPERATURE: 98.1 F | HEIGHT: 68 IN | BODY MASS INDEX: 29.25 KG/M2 | SYSTOLIC BLOOD PRESSURE: 157 MMHG

## 2018-07-31 DIAGNOSIS — I10 ESSENTIAL HYPERTENSION: ICD-10-CM

## 2018-07-31 DIAGNOSIS — M25.512 CHRONIC PAIN OF BOTH SHOULDERS: ICD-10-CM

## 2018-07-31 DIAGNOSIS — G89.29 CHRONIC PAIN OF BOTH SHOULDERS: ICD-10-CM

## 2018-07-31 DIAGNOSIS — M25.511 CHRONIC PAIN OF BOTH SHOULDERS: ICD-10-CM

## 2018-07-31 DIAGNOSIS — R14.0 ABDOMINAL BLOATING: Primary | ICD-10-CM

## 2018-07-31 DIAGNOSIS — R14.3 EXCESSIVE GAS: ICD-10-CM

## 2018-07-31 RX ORDER — LISINOPRIL 10 MG/1
TABLET ORAL
Qty: 90 TAB | Refills: 1 | Status: SHIPPED | OUTPATIENT
Start: 2018-07-31 | End: 2018-09-05

## 2018-07-31 RX ORDER — DICLOFENAC SODIUM 50 MG/1
50 TABLET, DELAYED RELEASE ORAL
Qty: 60 TAB | Refills: 2 | Status: SHIPPED | OUTPATIENT
Start: 2018-07-31 | End: 2018-11-02 | Stop reason: SDUPTHER

## 2018-07-31 NOTE — MR AVS SNAPSHOT
[address] [facility] 
 
 
 [address] [facility] 
[phone] Patient: Alexandra Melvin MRN: [id number] Visit Information Date & Time Provider Department Dept. Phone Encounter #  
 7/31/2018  9:15 AM Deepti Gonzalezjanniedonte 108 [phone] [id number] Follow-up Instructions Return if symptoms worsen or fail to improve. Your Appointments 7/31/2018  9:15 AM  
ACUTE CARE with Casimiro Emerson  [facility] ([facility]) Appt Note: stomach issues [address] [facility] 860 1067 [facility]  
  
   
 [address] Upcoming Health Maintenance Date Due DTaP/Tdap/Td series (1 - Tdap) 12/24/1963 ZOSTER VACCINE AGE 60> 10/24/2002 GLAUCOMA SCREENING Q2Y 12/24/2007 Influenza Age 5 to Adult 8/1/2018 Pneumococcal 65+ Low/Medium Risk (2 of 2 - PCV13) 6/4/2019 Allergies as of 7/31/2018  Review Complete On: 7/31/2018 By: Casimiro Emerson NP Severity Noted Reaction Type Reactions Losartan  02/07/2018    Other (comments) Shaking, feeling unbalanced. Current Immunizations  Never Reviewed Name Date Pneumococcal Polysaccharide (PPSV-23) 6/4/2018 Not reviewed this visit You Were Diagnosed With   
  
 Codes Comments Abdominal bloating    -  Primary ICD-10-CM: R14.0 ICD-9-CM: 787.3 Excessive gas     ICD-10-CM: R14.3 ICD-9-CM: 787.3 Essential hypertension     ICD-10-CM: I10 
ICD-9-CM: 401.9 Chronic pain of both shoulders     ICD-10-CM: M25.511, G89.29, M25.512 ICD-9-CM: 719.41, 338.29 Vitals BP Pulse Temp Resp Height(growth percentile) Weight(growth percentile) 157/86 (BP 1 Location: Left arm, BP Patient Position: Sitting) 78 98.1 °F (36.7 °C) (Oral) 16 5' 8\" (1.727 m) 193 lb (87.5 kg) SpO2 BMI Smoking Status 98% 29.35 kg/m2 Never Smoker Vitals History BMI and BSA Data Body Mass Index Body Surface Area  
 29.35 kg/m 2 2.05 m 2 Preferred Pharmacy Pharmacy Name Phone [id number] [address] [phone] Your Updated Medication List  
  
   
This list is accurate as of 7/31/18  9:12 AM.  Always use your most recent med list.  
  
  
  
  
 aspirin delayed-release 81 mg tablet Take  by mouth daily. atorvastatin 10 mg tablet Commonly known as:  LIPITOR  
TAKE 1 TABLET BY MOUTH EVERY DAY  
  
 busPIRone 5 mg tablet Commonly known as:  BUSPAR  
TAKE 1 TABLET BY MOUTH EVERY DAY FOR 3 DAYS THEN TWICE A DAY UNTIL RE-EVALUATED BY DOCTOR  
  
 diclofenac EC 50 mg EC tablet Commonly known as:  VOLTAREN Take 1 Tab by mouth two (2) times daily as needed. LIPOFLAVONOID PO Take 1 Tab by mouth once over twenty-four (24) hours. lisinopril 10 mg tablet Commonly known as:  PRINIVIL, ZESTRIL  
TAKE 1 TABLET BY MOUTH ONCE A DAY  
  
 metoprolol succinate 25 mg XL tablet Commonly known as:  TOPROL-XL Take 25 mg by mouth daily. multivitamin, tx-iron-ca-min 9 mg iron-400 mcg Tab tablet Commonly known as:  THERA-M w/ IRON Take 1 Tab by mouth daily. Prescriptions Sent to Pharmacy Refills  
 lisinopril (PRINIVIL, ZESTRIL) 10 mg tablet 1 Sig: TAKE 1 TABLET BY MOUTH ONCE A DAY Class: Normal  
 Pharmacy: [address] [place] [address] Ph #: [phone]  
 diclofenac EC (VOLTAREN) 50 mg EC tablet 2 Sig: Take 1 Tab by mouth two (2) times daily as needed. Class: Normal  
 Pharmacy: [facility]/pharmacy [id number], [address]38 [facility] Ph #: [phone] Route: Oral  
  
We Performed the Following CBC WITH AUTOMATED DIFF [55228 CPT(R)] H. PYLORI ABS, IGG, IGA, IGM [id number] CPT(R)] METABOLIC PANEL, COMPREHENSIVE [88022 CPT(R)] Follow-up Instructions Return if symptoms worsen or fail to improve. Patient Instructions Gas and Bloating: Care Instructions Your Care Instructions Gas and bloating can be uncomfortable and embarrassing problems. All people pass gas, but some people produce more gas than others, sometimes enough to cause distress. It is normal to pass gas from 6 to 20 times per day. Excess gas usually is not caused by a serious health problem. Gas and bloating usually are caused by something you eat or drink, including some food supplements and medicines. Gas and bloating are usually harmless and go away without treatment. However, changing your diet can help end the problem. Some over-the-counter medicines can help prevent gas and relieve bloating. Follow-up care is a key part of your treatment and safety. Be sure to make and go to all appointments, and call your doctor if you are having problems. It's also a good idea to know your test results and keep a list of the medicines you take. How can you care for yourself at home? · Keep a food diary if you think a food gives you gas. Write down what you eat or drink. Also record when you get gas. If you notice that a food seems to cause your gas each time, avoid it and see if the gas goes away. Examples of foods that cause gas include: ¨ Fried and fatty foods. ¨ Beans. ¨ Vegetables such as artichokes, asparagus, broccoli, brussels sprouts, cabbage, cauliflower, cucumbers, green peppers, onions, peas, radishes, and raw potatoes. ¨ Fruits such as apricots, bananas, melons, peaches, pears, prunes, and raw apples. ¨ Wheat and wheat bran. · Soak dry beans in water overnight, then dump the water and cook the soaked beans in new water. This can help prevent gas and bloating. · If you have problems with lactose, avoid dairy products such as milk and cheese. · Try not to swallow air. Do not drink through a straw, gulp your food, or chew gum. · Take an over-the-counter medicine. Read and follow all instructions on the label. ¨ Food enzymes, such as Beano, can be added to gas-producing foods to prevent gas. ¨ Antacids, such as Maalox Anti-Gas and Mylanta Gas, can relieve bloating by making you burp. Be careful when you take over-the-counter antacid medicines. Many of these medicines have aspirin in them. Read the label to make sure that you are not taking more than the recommended dose. Too much aspirin can be harmful. ¨ Activated charcoal tablets, such as CharcoCaps, may decrease odor from gas you pass. ¨ If you have problems with lactose, you can take medicines such as Dairy Ease and Lactaid with dairy products to prevent gas and bloating. · Get some exercise regularly. When should you call for help? Call 911 anytime you think you may need emergency care. For example, call if: 
  · You have gas and signs of a heart attack, such as: ¨ Chest pain or pressure. ¨ Sweating. ¨ Shortness of breath. ¨ Nausea or vomiting. ¨ Pain that spreads from the chest to the neck, jaw, or one or both shoulders or arms. ¨ Dizziness or lightheadedness. ¨ A fast or uneven pulse. After calling 911, chew 1 adult-strength aspirin. Wait for an ambulance. Do not try to drive yourself.  
 Call your doctor now or seek immediate medical care if: 
  · You have severe belly pain.  
  · You have blood in your stool.  
 Watch closely for changes in your health, and be sure to contact your doctor if: 
  · You have blood or pus in your urine.  
  · Your urine is cloudy or smells bad.  
  · You are burping and have trouble swallowing.  
  · You feel bloated and have swelling in your belly.  
  · You do not get better as expected. Where can you learn more? Go to http://prabhjot-willie.info/. Enter G970 in the search box to learn more about \"Gas and Bloating: Care Instructions. \" Current as of: November 20, 2017 Content Version: 11.7 © 3439-1055 Healthwise, Incorporated. Care instructions adapted under license by [employer] (which disclaims liability or warranty for this information). If you have questions about a medical condition or this instruction, always ask your healthcare professional. [address] any warranty or liability for your use of this information. Introducing [facility]! [facility] introduces [facility]. patient portal. Now you can access parts of your medical record, email your doctor's office, and request medication refills online. 1. In your internet browser, go to [email or web address] 2. Click on the First Time User? Click Here link in the Sign In box. You will see the New Member Sign Up page. 3. Enter your [facility]. Access Code exactly as it appears below. You will not need to use this code after youve completed the sign-up process. If you do not sign up before the expiration date, you must request a new code. · [facility]. Access Code: [email or web address] Expires: 9/18/2018  4:50 PM 
 
4. Enter the last four digits of your Social Security Number (xxxx) and Date of Birth (mm/dd/yyyy) as indicated and click Submit. You will be taken to the next sign-up page. 5. Create a [facility]. ID. This will be your [facility]. login ID and cannot be changed, so think of one that is secure and easy to remember. 6. Create a [facility]. password. You can change your password at any time. 7. Enter your Password Reset Question and Answer. This can be used at a later time if you forget your password. 8. Enter your e-mail address. You will receive e-mail notification when new information is available in [address] 9. Click Sign Up. You can now view and download portions of your medical record. 10. Click the Download Summary menu link to download a portable copy of your medical information.  
 
If you have questions, please visit the Frequently Asked Questions section of the [employer]. Remember, [employer] is NOT to be used for urgent needs. For medical emergencies, dial 911. Now available from your iPhone and Android! Please provide this summary of care documentation to your next provider. Your primary care clinician is listed as [address]. If you have any questions after today's visit, please call [phone].

## 2018-07-31 NOTE — PATIENT INSTRUCTIONS
Gas and Bloating: Care Instructions  Your Care Instructions    Gas and bloating can be uncomfortable and embarrassing problems. All people pass gas, but some people produce more gas than others, sometimes enough to cause distress. It is normal to pass gas from 6 to 20 times per day. Excess gas usually is not caused by a serious health problem. Gas and bloating usually are caused by something you eat or drink, including some food supplements and medicines. Gas and bloating are usually harmless and go away without treatment. However, changing your diet can help end the problem. Some over-the-counter medicines can help prevent gas and relieve bloating. Follow-up care is a key part of your treatment and safety. Be sure to make and go to all appointments, and call your doctor if you are having problems. It's also a good idea to know your test results and keep a list of the medicines you take. How can you care for yourself at home? · Keep a food diary if you think a food gives you gas. Write down what you eat or drink. Also record when you get gas. If you notice that a food seems to cause your gas each time, avoid it and see if the gas goes away. Examples of foods that cause gas include:  ¨ Fried and fatty foods. ¨ Beans. ¨ Vegetables such as artichokes, asparagus, broccoli, brussels sprouts, cabbage, cauliflower, cucumbers, green peppers, onions, peas, radishes, and raw potatoes. ¨ Fruits such as apricots, bananas, melons, peaches, pears, prunes, and raw apples. ¨ Wheat and wheat bran. · Soak dry beans in water overnight, then dump the water and cook the soaked beans in new water. This can help prevent gas and bloating. · If you have problems with lactose, avoid dairy products such as milk and cheese. · Try not to swallow air. Do not drink through a straw, gulp your food, or chew gum. · Take an over-the-counter medicine. Read and follow all instructions on the label.   ¨ Food enzymes, such as Beano, can be added to gas-producing foods to prevent gas. ¨ Antacids, such as Maalox Anti-Gas and Mylanta Gas, can relieve bloating by making you burp. Be careful when you take over-the-counter antacid medicines. Many of these medicines have aspirin in them. Read the label to make sure that you are not taking more than the recommended dose. Too much aspirin can be harmful. ¨ Activated charcoal tablets, such as CharcoCaps, may decrease odor from gas you pass. ¨ If you have problems with lactose, you can take medicines such as Dairy Ease and Lactaid with dairy products to prevent gas and bloating. · Get some exercise regularly. When should you call for help? Call 911 anytime you think you may need emergency care. For example, call if:    · You have gas and signs of a heart attack, such as:  ¨ Chest pain or pressure. ¨ Sweating. ¨ Shortness of breath. ¨ Nausea or vomiting. ¨ Pain that spreads from the chest to the neck, jaw, or one or both shoulders or arms. ¨ Dizziness or lightheadedness. ¨ A fast or uneven pulse. After calling 911, chew 1 adult-strength aspirin. Wait for an ambulance. Do not try to drive yourself.    Call your doctor now or seek immediate medical care if:    · You have severe belly pain.     · You have blood in your stool.    Watch closely for changes in your health, and be sure to contact your doctor if:    · You have blood or pus in your urine.     · Your urine is cloudy or smells bad.     · You are burping and have trouble swallowing.     · You feel bloated and have swelling in your belly.     · You do not get better as expected. Where can you learn more? Go to http://prabhjot-willie.info/. Enter R169 in the search box to learn more about \"Gas and Bloating: Care Instructions. \"  Current as of: November 20, 2017  Content Version: 11.7  © 3195-6224 [employer], [employer].  Care instructions adapted under license by [employer] (which disclaims liability or warranty for this information). If you have questions about a medical condition or this instruction, always ask your healthcare professional. [place] [address] any warranty or liability for your use of this information.

## 2018-07-31 NOTE — PROGRESS NOTES
Identified pt with two pt identifiers(name and ). No chief complaint on file. Health Maintenance Due   Topic    DTaP/Tdap/Td series (1 - Tdap)    ZOSTER VACCINE AGE 60>     GLAUCOMA SCREENING Q2Y        Wt Readings from Last 3 Encounters:   18 193 lb (87.5 kg)   18 192 lb 3.2 oz (87.2 kg)   18 193 lb 9.6 oz (87.8 kg)     Temp Readings from Last 3 Encounters:   18 97.9 °F (36.6 °C) (Oral)   18 97.9 °F (36.6 °C) (Oral)   18 98.1 °F (36.7 °C) (Oral)     BP Readings from Last 3 Encounters:   18 141/73   18 140/80   18 160/80     Pulse Readings from Last 3 Encounters:   18 65   18 74   18 72         Learning Assessment:  :     Learning Assessment 2018   PRIMARY LEARNER Patient   BARRIERS PRIMARY LEARNER VISUAL   CO-LEARNER CAREGIVER No   PRIMARY LANGUAGE ENGLISH   LEARNER PREFERENCE PRIMARY DEMONSTRATION     LISTENING     READING   ANSWERED BY patient   RELATIONSHIP SELF       Depression Screening:  :     PHQ over the last two weeks 2018   Little interest or pleasure in doing things Not at all   Feeling down, depressed, irritable, or hopeless Not at all   Total Score PHQ 2 0       Fall Risk Assessment:  :     Fall Risk Assessment, last 12 mths 2018   Able to walk? Yes   Fall in past 12 months? No       Abuse Screening:  :     Abuse Screening Questionnaire 2018   Do you ever feel afraid of your partner? N   Are you in a relationship with someone who physically or mentally threatens you? N   Is it safe for you to go home?  Y       Coordination of Care Questionnaire:  :     1) Have you been to an emergency room, urgent care clinic since your last visit? no   Hospitalized since your last visit? no             2) Have you seen or consulted any other health care providers outside of [address] [place] [address] since your last visit? no  (Include any pap smears or colon screenings in this section.)    3) Do you have an Advance Directive on file? no  Are you interested in receiving information about Advance Directives? no    Patient is accompanied by self. Reviewed record in preparation for visit and have obtained necessary documentation. Medication reconciliation up to date and corrected with patient at this time.

## 2018-07-31 NOTE — PROGRESS NOTES
HISTORY OF PRESENT ILLNESS  Juan R Martin is a 78 y.o. male. HPI  Pt presents with \"gas\"    Pt states that for about a week, he has been dealing with abdominal bloating and gas. Pt states that he had this about 6 months ago, and went to some ER, and was told that he had an abdominal infection. He was given antibiotics, which cleared his symptoms. Pt was not seen at a [facility], and no documentation is present at the visit. Pt denies any abdominal pain  No diarrhea, no vomiting  No fever  HE has a normal appetite  Pt just feels \"gassy\". In addition, he is requesting refills of Diclofenac and Lisinopril at this time. BP noted to be elevated, but he has not taken his BP medication this morning. Review of Systems   Constitutional: Negative for fever. HENT: Negative for congestion. Respiratory: Negative for cough. Gastrointestinal: Negative for abdominal pain, constipation, diarrhea, nausea and vomiting. Physical Exam   Constitutional: He is oriented to person, place, and time. He appears well-developed and well-nourished. HENT:   Head: Normocephalic and atraumatic. Cardiovascular: Normal rate, regular rhythm and normal heart sounds. Pulmonary/Chest: Effort normal and breath sounds normal.   Abdominal: Normal appearance and bowel sounds are normal. There is no tenderness. There is no rigidity, no rebound, no guarding, no CVA tenderness, no tenderness at McBurney's point and negative Childress's sign. Neurological: He is alert and oriented to person, place, and time. Skin: Skin is warm and dry. Psychiatric: He has a normal mood and affect. His behavior is normal.       ASSESSMENT and PLAN    ICD-10-CM ICD-9-CM    1. Abdominal bloating R14.0 787.3 CBC WITH AUTOMATED DIFF      METABOLIC PANEL, COMPREHENSIVE      H. PYLORI ABS, IGG, IGA, IGM   2. Excessive gas R14.3 787.3 CBC WITH AUTOMATED DIFF      METABOLIC PANEL, COMPREHENSIVE      H. PYLORI ABS, IGG, IGA, IGM   3.  Essential hypertension I10 401.9 lisinopril (PRINIVIL, ZESTRIL) 10 mg tablet   4. Chronic pain of both shoulders M25.511 719.41 diclofenac EC (VOLTAREN) 50 mg EC tablet    G89.29 338.29     M25.512       Informed patient that we will notify him when his labs return, and inform him of any change in plan of care at that time. Educated about [facility], and staying well hydrated. Pt informed to return to office with worsening of symptoms, or PRN with any questions or concerns. Pt verbalizes understanding of plan of care and denies further questions or concerns at this time.

## 2018-08-02 ENCOUNTER — TELEPHONE (OUTPATIENT)
Dept: FAMILY MEDICINE CLINIC | Age: 78
End: 2018-08-02

## 2018-08-02 DIAGNOSIS — R14.0 ABDOMINAL BLOATING: Primary | ICD-10-CM

## 2018-08-02 LAB
ALBUMIN SERPL-MCNC: 4.3 G/DL (ref 3.5–4.8)
ALBUMIN/GLOB SERPL: 1.6 {RATIO} (ref 1.2–2.2)
ALP SERPL-CCNC: 60 IU/L (ref 39–117)
ALT SERPL-CCNC: 24 IU/L (ref 0–44)
AST SERPL-CCNC: 30 IU/L (ref 0–40)
BASOPHILS # BLD AUTO: 0 X10E3/UL (ref 0–0.2)
BASOPHILS NFR BLD AUTO: 1 %
BILIRUB SERPL-MCNC: 0.6 MG/DL (ref 0–1.2)
BUN SERPL-MCNC: 13 MG/DL (ref 8–27)
BUN/CREAT SERPL: 10 (ref 10–24)
CALCIUM SERPL-MCNC: 9.2 MG/DL (ref 8.6–10.2)
CHLORIDE SERPL-SCNC: 104 MMOL/L (ref 96–106)
CO2 SERPL-SCNC: 22 MMOL/L (ref 20–29)
CREAT SERPL-MCNC: 1.26 MG/DL (ref 0.76–1.27)
EOSINOPHIL # BLD AUTO: 0.3 X10E3/UL (ref 0–0.4)
EOSINOPHIL NFR BLD AUTO: 10 %
ERYTHROCYTE [DISTWIDTH] IN BLOOD BY AUTOMATED COUNT: 13.3 % (ref 12.3–15.4)
GLOBULIN SER CALC-MCNC: 2.7 G/DL (ref 1.5–4.5)
GLUCOSE SERPL-MCNC: 111 MG/DL (ref 65–99)
H PYLORI IGA SER-ACNC: 32.9 UNITS (ref 0–8.9)
H PYLORI IGG SER IA-ACNC: <0.8 INDEX VALUE (ref 0–0.79)
H PYLORI IGM SER-ACNC: <9 UNITS (ref 0–8.9)
HCT VFR BLD AUTO: 42.3 % (ref 37.5–51)
HGB BLD-MCNC: 13.4 G/DL (ref 13–17.7)
IMM GRANULOCYTES # BLD: 0 X10E3/UL (ref 0–0.1)
IMM GRANULOCYTES NFR BLD: 0 %
INTERPRETATION: NORMAL
LYMPHOCYTES # BLD AUTO: 1.2 X10E3/UL (ref 0.7–3.1)
LYMPHOCYTES NFR BLD AUTO: 39 %
MCH RBC QN AUTO: 30.9 PG (ref 26.6–33)
MCHC RBC AUTO-ENTMCNC: 31.7 G/DL (ref 31.5–35.7)
MCV RBC AUTO: 98 FL (ref 79–97)
MONOCYTES # BLD AUTO: 0.3 X10E3/UL (ref 0.1–0.9)
MONOCYTES NFR BLD AUTO: 10 %
NEUTROPHILS # BLD AUTO: 1.2 X10E3/UL (ref 1.4–7)
NEUTROPHILS NFR BLD AUTO: 40 %
PLATELET # BLD AUTO: 163 X10E3/UL (ref 150–379)
POTASSIUM SERPL-SCNC: 4.4 MMOL/L (ref 3.5–5.2)
PROT SERPL-MCNC: 7 G/DL (ref 6–8.5)
RBC # BLD AUTO: 4.34 X10E6/UL (ref 4.14–5.8)
SODIUM SERPL-SCNC: 143 MMOL/L (ref 134–144)
WBC # BLD AUTO: 3 X10E3/UL (ref 3.4–10.8)

## 2018-08-02 NOTE — TELEPHONE ENCOUNTER
Pt requested a call back in regards to recent lab results from appt on 7/31/2018. Best contact [phone].

## 2018-08-02 NOTE — PROGRESS NOTES
Please call patient and let him know that hs labs returned and are stable, besides one antibody for H Pylori. It is not showing active infection currently, but should he continue to have symptoms he should return to office for stool collection testing for H Pylori, or should follow up with GI ASAP. Let me know what he would prefer.   Thanks

## 2018-08-02 NOTE — TELEPHONE ENCOUNTER
Noted.  I have placed order for H Pylori testing in stool. He can return to office to get the collection kit and instructions from [facility]. Does not need appointment.   Thanks

## 2018-08-02 NOTE — TELEPHONE ENCOUNTER
Spoke with patient and per NP result note advised patient that if symptoms persist or worsen, he should return to the office for further testing or see his GI doctor. Patient stated, \"I don't have a GI doctor so I'll just come back to the office. \"  Voices appreciation for the phone call.

## 2018-08-02 NOTE — PROGRESS NOTES
Pt was advised via detailed TM. I have also mailed a letter to his home with results/recommendations.

## 2018-08-08 LAB — H PYLORI AG STL QL IA: NEGATIVE

## 2018-08-08 NOTE — PROGRESS NOTES
Please call patient and let him know that his stool returned negaitve for H Pylori. If he is still having abdominal symptoms, should be seen by GI. I am happy to refer him to someone should he need this.   Thanks

## 2018-08-09 NOTE — PROGRESS NOTES
Patient was advised and verbalized understanding. He reports he would like to f/u with GI as sx are still present and was given the office information for Dr. Tera Wiggins.   Phone:[phone]  CSA:[phone]

## 2018-08-22 ENCOUNTER — TELEPHONE (OUTPATIENT)
Dept: FAMILY MEDICINE CLINIC | Age: 78
End: 2018-08-22

## 2018-08-22 NOTE — TELEPHONE ENCOUNTER
Spoke to Mr. Mariola Mesa. Complaining of ringing in his left ear. Has seen Dr. Tracy Brown in the past, referred by Dr. Stern Staff. Does not wish to go back to him. He will  the referral when ready.   States, it's not a new problem and he did not mention it when he saw the NP.

## 2018-08-22 NOTE — TELEPHONE ENCOUNTER
----- Message from Marino Garcia sent at 8/22/2018 10:14 AM EDT -----  Regarding: TANVI Leach/Telephone  Pt requesting a referral for a ENT Specialist. Best Contact Number [phone]

## 2018-08-22 NOTE — TELEPHONE ENCOUNTER
Attempted to reach Mrs Erin Roth to explain that the NP recommendation was for a GI specialist, not an ENT. Left message requesting a call back.

## 2018-09-05 ENCOUNTER — OFFICE VISIT (OUTPATIENT)
Dept: FAMILY MEDICINE CLINIC | Age: 78
End: 2018-09-05

## 2018-09-05 VITALS
HEART RATE: 56 BPM | TEMPERATURE: 98.2 F | BODY MASS INDEX: 28.85 KG/M2 | WEIGHT: 190.4 LBS | HEIGHT: 68 IN | SYSTOLIC BLOOD PRESSURE: 160 MMHG | DIASTOLIC BLOOD PRESSURE: 72 MMHG | OXYGEN SATURATION: 96 % | RESPIRATION RATE: 20 BRPM

## 2018-09-05 DIAGNOSIS — H93.12 TINNITUS OF LEFT EAR: ICD-10-CM

## 2018-09-05 DIAGNOSIS — I10 ESSENTIAL HYPERTENSION: Primary | ICD-10-CM

## 2018-09-05 DIAGNOSIS — F41.9 ANXIETY: ICD-10-CM

## 2018-09-05 RX ORDER — LISINOPRIL 20 MG/1
20 TABLET ORAL
Qty: 90 TAB | Refills: 1 | Status: SHIPPED | OUTPATIENT
Start: 2018-09-05 | End: 2018-10-31

## 2018-09-05 RX ORDER — BUSPIRONE HYDROCHLORIDE 7.5 MG/1
7.5 TABLET ORAL 2 TIMES DAILY
Qty: 60 TAB | Refills: 5 | Status: SHIPPED | OUTPATIENT
Start: 2018-09-05 | End: 2018-09-21 | Stop reason: DRUGHIGH

## 2018-09-05 RX ORDER — FLUTICASONE PROPIONATE 50 MCG
2 SPRAY, SUSPENSION (ML) NASAL DAILY
COMMUNITY
Start: 2018-08-13 | End: 2018-10-18

## 2018-09-05 NOTE — PROGRESS NOTES
Chief Complaint Patient presents with  Hypertension Patient complains that blood pressure has been high in the evenings (195) He is a 78 y.o. male who presents with c/o elevated BP especially in the evening. SBP can get as high as 190's/90. He denies increased anxiety at that time. However, he is on lisinopril in addition to Metoprolol. He has been on 10 mgs for quite some while. He has doubled up on the dose and when he does, he feels better. Additionally, he continues to have ringing in his ears and is scheduled to see Dr. Vinicio Rushing on Friday. He had been evaluated by Dr. Vinicio Rushing before. Reviewed PmHx, RxHx, FmHx, SocHx, AllgHx and updated and dated in the chart. Patient Active Problem List  
 Diagnosis  Anxiety  Tinnitus of left ear  Essential hypertension  Pure hypercholesterolemia Review of Systems - negative except as listed above in the HPI Objective:  
 
Vitals:  
 09/05/18 1113 BP: 160/72 Pulse: (!) 56 Resp: 20 Temp: 98.2 °F (36.8 °C) TempSrc: Oral  
SpO2: 96% Weight: 190 lb 6.4 oz (86.4 kg) Height: 5' 8\" (1.727 m) Physical Examination: General appearance - alert, well appearing, and in no distress Chest - clear to auscultation, no wheezes, rales or rhonchi, symmetric air entry Heart - normal rate, regular rhythm, normal S1, S2, no murmurs, rubs, clicks or gallops Neurological - alert, oriented, normal speech, no focal findings or movement disorder noted Musculoskeletal - no joint tenderness, deformity or swelling Extremities - peripheral pulses normal, no pedal edema, no clubbing or cyanosis Assessment/ Plan: ICD-10-CM ICD-9-CM 1. Essential hypertension I10 401.9 lisinopril (PRINIVIL, ZESTRIL) 20 mg tablet 2. Tinnitus of left ear H93.12 388.30   
3. Anxiety F41.9 300.00 busPIRone (BUSPAR) 7.5 mg tablet 77M who presents with episodic hypertension and mostly, I think driven by anxiety. I have increased his Lisinopril to 20 mgs daily and also increased his Buspar. We will continue to monitor. Reviewed and stressed anxiety and relaxation techniques. I have discussed the diagnosis with the patient and the intended treatment plan as seen in the above orders. The patient has received an after-visit summary and questions were answered concerning future plans. Asked to return should symptoms worsen or not improve with treatment. Any pending labs and studies will be relayed to patient when they become available. Pt verbalizes understanding of plan of care and denies further questions or concerns at this time.   
 
Damon Byers MD

## 2018-09-05 NOTE — PATIENT INSTRUCTIONS

## 2018-09-05 NOTE — MR AVS SNAPSHOT
[address] [facility] 
 
 
 [address]  [address] [facility] 
[phone] Patient: Gilles Ashraf MRN: [id number] Visit Information Date & Time Provider Department Dept. Phone Encounter #  
 9/5/2018 11:00 AM Quinten Lo Alfonso [phone] [id number] Follow-up Instructions Return if symptoms worsen or fail to improve. Upcoming Health Maintenance Date Due DTaP/Tdap/Td series (1 - Tdap) 12/24/1963 ZOSTER VACCINE AGE 60> 10/24/2002 GLAUCOMA SCREENING Q2Y 12/24/2007 Influenza Age 5 to Adult 8/1/2018 MEDICARE YEARLY EXAM 9/5/2018 Pneumococcal 65+ Low/Medium Risk (2 of 2 - PCV13) 6/4/2019 Allergies as of 9/5/2018  Review Complete On: 9/5/2018 By: Angel Kaufman MD  
  
 Severity Noted Reaction Type Reactions Losartan  02/07/2018    Other (comments) Shaking, feeling unbalanced. Current Immunizations  Never Reviewed Name Date Pneumococcal Polysaccharide (PPSV-23) 6/4/2018 Not reviewed this visit You Were Diagnosed With   
  
 Codes Comments Essential hypertension    -  Primary ICD-10-CM: I10 
ICD-9-CM: 401.9 Tinnitus of left ear     ICD-10-CM: H93.12 
ICD-9-CM: 388.30 Anxiety     ICD-10-CM: F41.9 ICD-9-CM: 300.00 Vitals BP Pulse Temp Resp Height(growth percentile) Weight(growth percentile) 160/72 (BP 1 Location: Left arm, BP Patient Position: Sitting) (!) 56 98.2 °F (36.8 °C) (Oral) 20 5' 8\" (1.727 m) 190 lb 6.4 oz (86.4 kg) SpO2 BMI Smoking Status 96% 28.95 kg/m2 Never Smoker BMI and BSA Data Body Mass Index Body Surface Area  
 28.95 kg/m 2 2.04 m 2 Preferred Pharmacy Pharmacy Name Phone [id number] [phone] Your Updated Medication List  
  
   
This list is accurate as of 9/5/18 11:48 AM.  Always use your most recent med list.  
 aspirin delayed-release 81 mg tablet Take  by mouth daily. atorvastatin 10 mg tablet Commonly known as:  LIPITOR  
TAKE 1 TABLET BY MOUTH EVERY DAY  
  
 busPIRone 7.5 mg tablet Commonly known as:  BUSPAR Take 1 Tab by mouth two (2) times a day for 30 days. diclofenac EC 50 mg EC tablet Commonly known as:  VOLTAREN Take 1 Tab by mouth two (2) times daily as needed. fluticasone 50 mcg/actuation nasal spray Commonly known as:  FLONASE  
  
 LIPOFLAVONOID PO Take 1 Tab by mouth once over twenty-four (24) hours. lisinopril 20 mg tablet Commonly known as:  Alonzo Edman Take 1 Tab by mouth nightly for 90 days. metoprolol succinate 25 mg XL tablet Commonly known as:  TOPROL-XL Take 25 mg by mouth daily. multivitamin, tx-iron-ca-min 9 mg iron-400 mcg Tab tablet Commonly known as:  THERA-M w/ IRON Take 1 Tab by mouth daily. Prescriptions Sent to Pharmacy Refills  
 lisinopril (PRINIVIL, ZESTRIL) 20 mg tablet 1 Sig: Take 1 Tab by mouth nightly for 90 days. Class: Normal  
 Pharmacy: [facility]/pharmacy [address], [address] [place] [address] Ph #: [phone] Route: Oral  
 busPIRone (BUSPAR) 7.5 mg tablet 5 Sig: Take 1 Tab by mouth two (2) times a day for 30 days. Class: Normal  
 Pharmacy: [facility]/pharmacy [address], [address] [place] [address] Ph #: [phone] Route: Oral  
  
Follow-up Instructions Return if symptoms worsen or fail to improve. Patient Instructions DASH Diet: Care Instructions Your Care Instructions The DASH diet is an eating plan that can help lower your blood pressure. DASH stands for Dietary Approaches to Stop Hypertension. Hypertension is high blood pressure. The DASH diet focuses on eating foods that are high in calcium, potassium, and magnesium. These nutrients can lower blood pressure.  The foods that are highest in these nutrients are fruits, vegetables, low-fat dairy products, nuts, seeds, and legumes. But taking calcium, potassium, and magnesium supplements instead of eating foods that are high in those nutrients does not have the same effect. The DASH diet also includes whole grains, fish, and poultry. The DASH diet is one of several lifestyle changes your doctor may recommend to lower your high blood pressure. Your doctor may also want you to decrease the amount of sodium in your diet. Lowering sodium while following the DASH diet can lower blood pressure even further than just the DASH diet alone. Follow-up care is a key part of your treatment and safety. Be sure to make and go to all appointments, and call your doctor if you are having problems. It's also a good idea to know your test results and keep a list of the medicines you take. How can you care for yourself at home? Following the DASH diet · Eat 4 to 5 servings of fruit each day. A serving is 1 medium-sized piece of fruit, ½ cup chopped or canned fruit, 1/4 cup dried fruit, or 4 ounces (½ cup) of fruit juice. Choose fruit more often than fruit juice. · Eat 4 to 5 servings of vegetables each day. A serving is 1 cup of lettuce or raw leafy vegetables, ½ cup of chopped or cooked vegetables, or 4 ounces (½ cup) of vegetable juice. Choose vegetables more often than vegetable juice. · Get 2 to 3 servings of low-fat and fat-free dairy each day. A serving is 8 ounces of milk, 1 cup of yogurt, or 1 ½ ounces of cheese. · Eat 6 to 8 servings of grains each day. A serving is 1 slice of bread, 1 ounce of dry cereal, or ½ cup of cooked rice, pasta, or cooked cereal. Try to choose whole-grain products as much as possible. · Limit lean meat, poultry, and fish to 2 servings each day. A serving is 3 ounces, about the size of a deck of cards.  
· Eat 4 to 5 servings of nuts, seeds, and legumes (cooked dried beans, lentils, and split peas) each week. A serving is 1/3 cup of nuts, 2 tablespoons of seeds, or ½ cup of cooked beans or peas. · Limit fats and oils to 2 to 3 servings each day. A serving is 1 teaspoon of vegetable oil or 2 tablespoons of salad dressing. · Limit sweets and added sugars to 5 servings or less a week. A serving is 1 tablespoon jelly or jam, ½ cup sorbet, or 1 cup of lemonade. · Eat less than 2,300 milligrams (mg) of sodium a day. If you limit your sodium to 1,500 mg a day, you can lower your blood pressure even more. Tips for success · Start small. Do not try to make dramatic changes to your diet all at once. You might feel that you are missing out on your favorite foods and then be more likely to not follow the plan. Make small changes, and stick with them. Once those changes become habit, add a few more changes. · Try some of the following: ¨ Make it a goal to eat a fruit or vegetable at every meal and at snacks. This will make it easy to get the recommended amount of fruits and vegetables each day. ¨ Try yogurt topped with fruit and nuts for a snack or healthy dessert. ¨ Add lettuce, tomato, cucumber, and onion to sandwiches. ¨ Combine a ready-made pizza crust with low-fat mozzarella cheese and lots of vegetable toppings. Try using tomatoes, squash, spinach, broccoli, carrots, cauliflower, and onions. ¨ Have a variety of cut-up vegetables with a low-fat dip as an appetizer instead of chips and dip. ¨ Sprinkle sunflower seeds or chopped almonds over salads. Or try adding chopped walnuts or almonds to cooked vegetables. ¨ Try some vegetarian meals using beans and peas. Add garbanzo or kidney beans to salads. Make burritos and tacos with mashed romeo beans or black beans. Where can you learn more? Go to http://prabhjot-willie.info/. Enter M672 in the search box to learn more about \"DASH Diet: Care Instructions. \" Current as of: December 6, 2017 Content Version: 11.7 © 1977-7154 Healthwise, Incorporated. Care instructions adapted under license by [employer] (which disclaims liability or warranty for this information). If you have questions about a medical condition or this instruction, always ask your healthcare professional. [address] any warranty or liability for your use of this information. Introducing [facility]! The [facility] introduces wildcraft patient portal. Now you can access parts of your medical record, email your doctor's office, and request medication refills online. 1. In your internet browser, go to [email or web address] 2. Click on the First Time User? Click Here link in the Sign In box. You will see the New Member Sign Up page. 3. Enter your wildcraft Access Code exactly as it appears below. You will not need to use this code after youve completed the sign-up process. If you do not sign up before the expiration date, you must request a new code. · wildcraft Access Code: [email or web address] Expires: 9/18/2018  4:50 PM 
 
4. Enter the last four digits of your Social Security Number (xxxx) and Date of Birth (mm/dd/yyyy) as indicated and click Submit. You will be taken to the next sign-up page. 5. Create a wildcraft ID. This will be your wildcraft login ID and cannot be changed, so think of one that is secure and easy to remember. 6. Create a wildcraft password. You can change your password at any time. 7. Enter your Password Reset Question and Answer. This can be used at a later time if you forget your password. 8. Enter your e-mail address. You will receive e-mail notification when new information is available in [address] 9. Click Sign Up. You can now view and download portions of your medical record. 10. Click the Download Summary menu link to download a portable copy of your medical information.  
 
If you have questions, please visit the Frequently Asked Questions section of the [employer]. Remember, [employer] is NOT to be used for urgent needs. For medical emergencies, dial 911. Now available from your iPhone and Android! Please provide this summary of care documentation to your next provider. Your primary care clinician is listed as [address]. If you have any questions after today's visit, please call [phone].

## 2018-09-05 NOTE — PROGRESS NOTES
Identified pt with two pt identifiers(name and ). Chief Complaint Patient presents with  Hypertension Patient complains that blood pressure has been high in the evenings (195) Health Maintenance Due Topic  DTaP/Tdap/Td series (1 - Tdap)  ZOSTER VACCINE AGE 60>   
 GLAUCOMA SCREENING Q2Y  Influenza Age 5 to Adult  MEDICARE YEARLY EXAM   
 
 
Wt Readings from Last 3 Encounters:  
18 193 lb (87.5 kg) 18 193 lb (87.5 kg) 18 192 lb 3.2 oz (87.2 kg) Temp Readings from Last 3 Encounters:  
18 98.1 °F (36.7 °C) (Oral) 18 97.9 °F (36.6 °C) (Oral) 18 97.9 °F (36.6 °C) (Oral) BP Readings from Last 3 Encounters:  
18 157/86  
18 141/73  
18 140/80 Pulse Readings from Last 3 Encounters:  
18 78  
18 65  
18 74 Learning Assessment: 
:  
 
Learning Assessment 2018 PRIMARY LEARNER Patient BARRIERS PRIMARY LEARNER VISUAL  
CO-LEARNER CAREGIVER No  
PRIMARY LANGUAGE ENGLISH  
LEARNER PREFERENCE PRIMARY DEMONSTRATION  
  LISTENING  
  READING  
ANSWERED BY patient RELATIONSHIP SELF Depression Screening: 
:  
 
PHQ over the last two weeks 2018 Little interest or pleasure in doing things Not at all Feeling down, depressed, irritable, or hopeless Not at all Total Score PHQ 2 0 Fall Risk Assessment: 
:  
 
Fall Risk Assessment, last 12 mths 2018 Able to walk? Yes Fall in past 12 months? No  
 
 
Abuse Screening: 
:  
 
Abuse Screening Questionnaire 2018 Do you ever feel afraid of your partner? Juan Leiva Are you in a relationship with someone who physically or mentally threatens you? Juan Leiva Is it safe for you to go home? Shira Balderas Coordination of Care Questionnaire: 
:  
 
1) Have you been to an emergency room, urgent care clinic since your last visit? no  
Hospitalized since your last visit? no          
 
 2) Have you seen or consulted any other health care providers outside of [address] [place] [address] since your last visit? no  (Include any pap smears or colon screenings in this section.) 3) Do you have an Advance Directive on file? No Paperwork given per patient's request. 
Are you interested in receiving information about Advance Directives? no 
 
Reviewed record in preparation for visit and have obtained necessary documentation. Medication reconciliation up to date and corrected with patient at this time. Patient presents with complaints of having high blood pressures with a systolic of 452 and a diastolic of >476 randomly in the evenings. The only symptoms he states is perspiration and dizziness.

## 2018-09-07 ENCOUNTER — HOSPITAL ENCOUNTER (EMERGENCY)
Age: 78
Discharge: HOME | End: 2018-09-07
Attending: EMERGENCY MEDICINE
Payer: MEDICARE

## 2018-09-07 ENCOUNTER — APPOINTMENT (OUTPATIENT)
Dept: GENERAL RADIOLOGY | Age: 78
End: 2018-09-07
Attending: EMERGENCY MEDICINE
Payer: MEDICARE

## 2018-09-07 VITALS
HEIGHT: 71 IN | SYSTOLIC BLOOD PRESSURE: 140 MMHG | BODY MASS INDEX: 26.6 KG/M2 | DIASTOLIC BLOOD PRESSURE: 76 MMHG | HEART RATE: 84 BPM | WEIGHT: 190 LBS | TEMPERATURE: 98.2 F | OXYGEN SATURATION: 99 % | RESPIRATION RATE: 16 BRPM

## 2018-09-07 DIAGNOSIS — I10 HYPERTENSION, UNSPECIFIED TYPE: Primary | ICD-10-CM

## 2018-09-07 LAB
ALBUMIN SERPL-MCNC: 3.6 G/DL (ref 3.5–5)
ALBUMIN/GLOB SERPL: 1 {RATIO} (ref 1.1–2.2)
ALP SERPL-CCNC: 62 U/L (ref 45–117)
ALT SERPL-CCNC: 47 U/L (ref 12–78)
ANION GAP SERPL CALC-SCNC: 9 MMOL/L (ref 5–15)
AST SERPL-CCNC: 30 U/L (ref 15–37)
ATRIAL RATE: 66 BPM
BASOPHILS # BLD: 0 K/UL (ref 0–0.1)
BASOPHILS NFR BLD: 1 % (ref 0–1)
BILIRUB SERPL-MCNC: 0.5 MG/DL (ref 0.2–1)
BUN SERPL-MCNC: 13 MG/DL (ref 6–20)
BUN/CREAT SERPL: 10 (ref 12–20)
CALCIUM SERPL-MCNC: 8.8 MG/DL (ref 8.5–10.1)
CALCULATED P AXIS, ECG09: 60 DEGREES
CALCULATED R AXIS, ECG10: 4 DEGREES
CALCULATED T AXIS, ECG11: 39 DEGREES
CHLORIDE SERPL-SCNC: 106 MMOL/L (ref 97–108)
CO2 SERPL-SCNC: 27 MMOL/L (ref 21–32)
CREAT SERPL-MCNC: 1.29 MG/DL (ref 0.7–1.3)
DIAGNOSIS, 93000: NORMAL
DIFFERENTIAL METHOD BLD: ABNORMAL
EOSINOPHIL # BLD: 0.3 K/UL (ref 0–0.4)
EOSINOPHIL NFR BLD: 8 % (ref 0–7)
ERYTHROCYTE [DISTWIDTH] IN BLOOD BY AUTOMATED COUNT: 11.5 % (ref 11.5–14.5)
GLOBULIN SER CALC-MCNC: 3.7 G/DL (ref 2–4)
GLUCOSE SERPL-MCNC: 116 MG/DL (ref 65–100)
HCT VFR BLD AUTO: 41.6 % (ref 36.6–50.3)
HGB BLD-MCNC: 13.7 G/DL (ref 12.1–17)
IMM GRANULOCYTES # BLD: 0 K/UL (ref 0–0.04)
IMM GRANULOCYTES NFR BLD AUTO: 0 % (ref 0–0.5)
LYMPHOCYTES # BLD: 1.3 K/UL (ref 0.8–3.5)
LYMPHOCYTES NFR BLD: 37 % (ref 12–49)
MCH RBC QN AUTO: 31.5 PG (ref 26–34)
MCHC RBC AUTO-ENTMCNC: 32.9 G/DL (ref 30–36.5)
MCV RBC AUTO: 95.6 FL (ref 80–99)
MONOCYTES # BLD: 0.3 K/UL (ref 0–1)
MONOCYTES NFR BLD: 9 % (ref 5–13)
NEUTS SEG # BLD: 1.6 K/UL (ref 1.8–8)
NEUTS SEG NFR BLD: 45 % (ref 32–75)
NRBC # BLD: 0 K/UL (ref 0–0.01)
NRBC BLD-RTO: 0 PER 100 WBC
P-R INTERVAL, ECG05: 224 MS
PLATELET # BLD AUTO: 144 K/UL (ref 150–400)
PMV BLD AUTO: 10.7 FL (ref 8.9–12.9)
POTASSIUM SERPL-SCNC: 4 MMOL/L (ref 3.5–5.1)
PROT SERPL-MCNC: 7.3 G/DL (ref 6.4–8.2)
Q-T INTERVAL, ECG07: 378 MS
QRS DURATION, ECG06: 84 MS
QTC CALCULATION (BEZET), ECG08: 396 MS
RBC # BLD AUTO: 4.35 M/UL (ref 4.1–5.7)
SODIUM SERPL-SCNC: 142 MMOL/L (ref 136–145)
TROPONIN I SERPL-MCNC: <0.05 NG/ML
VENTRICULAR RATE, ECG03: 66 BPM
WBC # BLD AUTO: 3.6 K/UL (ref 4.1–11.1)

## 2018-09-07 PROCEDURE — 71046 X-RAY EXAM CHEST 2 VIEWS: CPT

## 2018-09-07 PROCEDURE — 80053 COMPREHEN METABOLIC PANEL: CPT | Performed by: EMERGENCY MEDICINE

## 2018-09-07 PROCEDURE — 84484 ASSAY OF TROPONIN QUANT: CPT | Performed by: EMERGENCY MEDICINE

## 2018-09-07 PROCEDURE — 36415 COLL VENOUS BLD VENIPUNCTURE: CPT | Performed by: EMERGENCY MEDICINE

## 2018-09-07 PROCEDURE — 74011250637 HC RX REV CODE- 250/637: Performed by: EMERGENCY MEDICINE

## 2018-09-07 PROCEDURE — 85025 COMPLETE CBC W/AUTO DIFF WBC: CPT | Performed by: EMERGENCY MEDICINE

## 2018-09-07 PROCEDURE — 93005 ELECTROCARDIOGRAM TRACING: CPT

## 2018-09-07 PROCEDURE — 99282 EMERGENCY DEPT VISIT SF MDM: CPT

## 2018-09-07 RX ORDER — HYDRALAZINE HYDROCHLORIDE 25 MG/1
25 TABLET, FILM COATED ORAL
Status: DISCONTINUED | OUTPATIENT
Start: 2018-09-07 | End: 2018-09-07 | Stop reason: HOSPADM

## 2018-09-07 RX ORDER — IBUPROFEN 800 MG/1
800 TABLET ORAL
Status: COMPLETED | OUTPATIENT
Start: 2018-09-07 | End: 2018-09-07

## 2018-09-07 RX ADMIN — IBUPROFEN 800 MG: 800 TABLET ORAL at 11:48

## 2018-09-07 NOTE — ED PROVIDER NOTES
HPI Comments: Mr. Mariano Booth is a 71YOM with Pmhx hyperlipidemia, HTN, arrythmia, diverticular disease, tinitus presenting with complaints of asymptomatic HTN. Patient states BP elevated to 379-853 systolic, states PCP increased lisinopril earlier this week, states takes lisinopril at night metoprolol, in the morning, including this morning. He denies CP, SOB, edema, weakness, dizziness, paresthesias, visual changes, states tinnitus is at baseline. Patient is a 78 y.o. male presenting with hypertension. The history is provided by the patient. No  was used. Hypertension This is a recurrent problem. The current episode started more than 1 week ago. The problem has been gradually worsening. Pertinent negatives include no chest pain, no orthopnea, no palpitations, no PND, no anxiety, no confusion, no malaise/fatigue, no blurred vision, no headaches, no tinnitus, no neck pain, no peripheral edema, no dizziness, no shortness of breath, no nausea and no vomiting. There are no associated agents to hypertension. Risk factors include dyslipidemia, family history, male gender and hypertension. Past Medical History:  
Diagnosis Date  Diverticulitis  High cholesterol  Hyperlipidemia  Hypertension  Irregular heart beat  Tinnitus No past surgical history on file. Family History:  
Problem Relation Age of Onset  Hypertension Mother  Stroke Father  Diabetes Sister  Hypertension Sister Social History Social History  Marital status: SINGLE Spouse name: N/A  
 Number of children: N/A  
 Years of education: N/A Occupational History  Not on file. Social History Main Topics  Smoking status: Never Smoker  Smokeless tobacco: Never Used  Alcohol use 1.8 - 2.4 oz/week 3 - 4 Standard drinks or equivalent per week Comment: socially  Drug use: No  
 Sexual activity: No  
 
Other Topics Concern  Not on file Social History Narrative ** Merged History Encounter ** ALLERGIES: Losartan Review of Systems Constitutional: Negative for activity change, chills, fever and malaise/fatigue. HENT: Negative for nosebleeds, sore throat, tinnitus, trouble swallowing and voice change. Eyes: Negative for blurred vision and visual disturbance. Respiratory: Negative for shortness of breath. Cardiovascular: Negative for chest pain, palpitations, orthopnea and PND. Gastrointestinal: Negative for abdominal pain, constipation, diarrhea, nausea and vomiting. Genitourinary: Negative for difficulty urinating, dysuria, hematuria and urgency. Musculoskeletal: Negative for back pain, neck pain and neck stiffness. Skin: Negative for color change. Allergic/Immunologic: Negative for immunocompromised state. Neurological: Negative for dizziness, seizures, syncope, weakness, light-headedness, numbness and headaches. Psychiatric/Behavioral: Negative for behavioral problems, confusion, hallucinations, self-injury and suicidal ideas. Vitals:  
 09/07/18 1032 BP: (!) 234/109 Pulse: 84 Resp: 16 Temp: 98.2 °F (36.8 °C) SpO2: 99% Weight: 86.2 kg (190 lb) Height: 5' 11\" (1.803 m) Physical Exam  
Constitutional: He is oriented to person, place, and time. He appears well-developed and well-nourished. No distress. HENT:  
Head: Normocephalic and atraumatic. Eyes: Pupils are equal, round, and reactive to light. Neck: Normal range of motion. Neck supple. Cardiovascular: Normal rate and normal heart sounds. An irregularly irregular rhythm present. Exam reveals no gallop and no friction rub. No murmur heard. Pulmonary/Chest: Effort normal and breath sounds normal. No respiratory distress. He has no wheezes. Abdominal: Soft. Bowel sounds are normal. He exhibits no distension. There is no tenderness. There is no rebound and no guarding. Musculoskeletal: Normal range of motion. Neurological: He is alert and oriented to person, place, and time. Skin: Skin is warm. No rash noted. He is not diaphoretic. Psychiatric: He has a normal mood and affect. His behavior is normal. Judgment and thought content normal.  
Nursing note and vitals reviewed. MDM Number of Diagnoses or Management Options Diagnosis management comments: ED EKG interpretation: 
Rhythm: sinus arrythmia; and irregular. Rate (approx.): 66; Axis: left axis deviation; P wave: prolonged; QRS interval: normal ; ST/T wave: normal; Other findings: abnormal ekg. This EKG was interpreted by Ernst Schilling MD,ED Provider. ED Course This is a 71YOM with PMHx, ROS and PE as above presenting with complaints of asymptomatic HTN, compliant with medications, despite adjustment in BP meds by PCP earlier this week. PE remarkable for well appearing elderly male in NAD, CTAB, regular rate, irregular rhythm, w/o MGR, soft non tender abd, nonfocal neuro exam.  Suspect poorly controlled HTN w/o end organ dysfunction. Plan to offer antihypertensive, obtain CXR EGK, trop, CBC and CMP. Will likely DC home with PCP f/u for further HTN management if studies w/o acute change and BP able to be controlled. Procedures 11:28 AM 
Patient with unremarkable lab work, unchanged EKG, CXR, BP improved to 986 systolic w/o tx, likely anxiety driven. Will DC home to f/u with PCP as above.

## 2018-09-07 NOTE — ED NOTES
Discharged by provider. Results and instructions reviewed by Dr. Marcus Sabillono. Given discharge instructions by this RN. Pt denies questions at time of discharge. Pt ambulatory.

## 2018-09-11 ENCOUNTER — OFFICE VISIT (OUTPATIENT)
Dept: FAMILY MEDICINE CLINIC | Age: 78
End: 2018-09-11

## 2018-09-11 VITALS
HEIGHT: 71 IN | BODY MASS INDEX: 26.6 KG/M2 | DIASTOLIC BLOOD PRESSURE: 88 MMHG | HEART RATE: 64 BPM | WEIGHT: 190 LBS | OXYGEN SATURATION: 97 % | SYSTOLIC BLOOD PRESSURE: 178 MMHG | RESPIRATION RATE: 18 BRPM | TEMPERATURE: 98.7 F

## 2018-09-11 DIAGNOSIS — I10 ESSENTIAL HYPERTENSION: Primary | ICD-10-CM

## 2018-09-11 RX ORDER — AMLODIPINE BESYLATE 5 MG/1
5 TABLET ORAL DAILY
Qty: 30 TAB | Refills: 0 | Status: SHIPPED | OUTPATIENT
Start: 2018-09-11 | End: 2018-10-02 | Stop reason: SDUPTHER

## 2018-09-11 RX ORDER — PNEUMOCOCCAL VACCINE POLYVALENT 25; 25; 25; 25; 25; 25; 25; 25; 25; 25; 25; 25; 25; 25; 25; 25; 25; 25; 25; 25; 25; 25; 25 UG/.5ML; UG/.5ML; UG/.5ML; UG/.5ML; UG/.5ML; UG/.5ML; UG/.5ML; UG/.5ML; UG/.5ML; UG/.5ML; UG/.5ML; UG/.5ML; UG/.5ML; UG/.5ML; UG/.5ML; UG/.5ML; UG/.5ML; UG/.5ML; UG/.5ML; UG/.5ML; UG/.5ML; UG/.5ML; UG/.5ML
INJECTION, SOLUTION INTRAMUSCULAR; SUBCUTANEOUS
COMMUNITY
Start: 2018-06-04 | End: 2018-09-11 | Stop reason: ALTCHOICE

## 2018-09-11 NOTE — PROGRESS NOTES
Identified pt with two pt identifiers(name and ). Chief Complaint   Patient presents with    Hypertension     reports his blood pressure has been running high        Health Maintenance Due   Topic    DTaP/Tdap/Td series (1 - Tdap)    ZOSTER VACCINE AGE 60>     GLAUCOMA SCREENING Q2Y     Influenza Age 5 to Adult     MEDICARE YEARLY EXAM        Wt Readings from Last 3 Encounters:   18 190 lb (86.2 kg)   18 190 lb (86.2 kg)   18 190 lb 6.4 oz (86.4 kg)     Temp Readings from Last 3 Encounters:   18 98.7 °F (37.1 °C) (Oral)   18 98.2 °F (36.8 °C)   18 98.2 °F (36.8 °C) (Oral)     BP Readings from Last 3 Encounters:   18 (!) 190/98   18 140/76   18 160/72     Pulse Readings from Last 3 Encounters:   18 64   18 84   18 (!) 56         Learning Assessment:  :     Learning Assessment 2018   PRIMARY LEARNER Patient   BARRIERS PRIMARY LEARNER VISUAL   CO-LEARNER CAREGIVER No   PRIMARY LANGUAGE ENGLISH   LEARNER PREFERENCE PRIMARY DEMONSTRATION     LISTENING     READING   ANSWERED BY patient   RELATIONSHIP SELF       Depression Screening:  :     PHQ over the last two weeks 2018   Little interest or pleasure in doing things Not at all   Feeling down, depressed, irritable, or hopeless Not at all   Total Score PHQ 2 0       Fall Risk Assessment:  :     Fall Risk Assessment, last 12 mths 2018   Able to walk? Yes   Fall in past 12 months? No       Abuse Screening:  :     Abuse Screening Questionnaire 2018   Do you ever feel afraid of your partner? N   Are you in a relationship with someone who physically or mentally threatens you? N   Is it safe for you to go home? Y       Coordination of Care Questionnaire:  :     1) Have you been to an emergency room, urgent care clinic since your last visit?  yes   Hospitalized since your last visit? no             2) Have you seen or consulted any other health care providers outside of [facility] [address] [place] [address] since your last visit? no  (Include any pap smears or colon screenings in this section.)    3) Do you have an Advance Directive on file? no  Are you interested in receiving information about Advance Directives? no    Patient is accompanied by self. Reviewed record in preparation for visit and have obtained necessary documentation. Medication reconciliation up to date and corrected with patient at this time.

## 2018-09-11 NOTE — MR AVS SNAPSHOT
[address] [facility] 
[phone] Patient: Hunter Palmer MRN: [id number] Visit Information Date & Time Provider Department Dept. Phone Encounter #  
 9/11/2018  9:30 AM Quinten Khan [phone] [id number] Follow-up Instructions Return in about 1 month (around 10/11/2018), or if symptoms worsen or fail to improve, for BP re-check. Upcoming Health Maintenance Date Due DTaP/Tdap/Td series (1 - Tdap) 12/24/1963 ZOSTER VACCINE AGE 60> 10/24/2002 GLAUCOMA SCREENING Q2Y 12/24/2007 Influenza Age 5 to Adult 8/1/2018 MEDICARE YEARLY EXAM 9/8/2018 Pneumococcal 65+ Low/Medium Risk (2 of 2 - PCV13) 6/4/2019 Allergies as of 9/11/2018  Review Complete On: 9/11/2018 By: Carlos Manuel Cobb NP Severity Noted Reaction Type Reactions Losartan  02/07/2018    Other (comments) Shaking, feeling unbalanced. Current Immunizations  Never Reviewed Name Date Pneumococcal Polysaccharide (PPSV-23) 6/4/2018 Not reviewed this visit You Were Diagnosed With   
  
 Codes Comments Essential hypertension    -  Primary ICD-10-CM: I10 
ICD-9-CM: 401.9 Vitals BP Pulse Temp Resp Height(growth percentile) Weight(growth percentile) 178/88 64 98.7 °F (37.1 °C) (Oral) 18 5' 11\" (1.803 m) 190 lb (86.2 kg) SpO2 BMI Smoking Status 97% 26.5 kg/m2 Never Smoker Vitals History BMI and BSA Data Body Mass Index Body Surface Area  
 26.5 kg/m 2 2.08 m 2 Preferred Pharmacy Pharmacy Name Phone [id number], [address] [phone] Your Updated Medication List  
  
   
This list is accurate as of 9/11/18  9:38 AM.  Always use your most recent med list. amLODIPine 5 mg tablet Commonly known as:  Yasmin Ho Take 1 Tab by mouth daily. aspirin delayed-release 81 mg tablet Take  by mouth daily. busPIRone 7.5 mg tablet Commonly known as:  BUSPAR Take 1 Tab by mouth two (2) times a day for 30 days. diclofenac EC 50 mg EC tablet Commonly known as:  VOLTAREN Take 1 Tab by mouth two (2) times daily as needed. fluticasone 50 mcg/actuation nasal spray Commonly known as:  FLONASE  
  
 LIPOFLAVONOID PO Take 1 Tab by mouth once over twenty-four (24) hours. lisinopril 20 mg tablet Commonly known as:  Heather Adamson Take 1 Tab by mouth nightly for 90 days. metoprolol succinate 25 mg XL tablet Commonly known as:  TOPROL-XL Take 25 mg by mouth daily. multivitamin, tx-iron-ca-min 9 mg iron-400 mcg Tab tablet Commonly known as:  THERA-M w/ IRON Take 1 Tab by mouth daily. Prescriptions Sent to Pharmacy Refills  
 amLODIPine (NORVASC) 5 mg tablet 0 Sig: Take 1 Tab by mouth daily. Class: Normal  
 [facility] [address], [address] [place] [address] #: [phone] Route: Oral  
  
Follow-up Instructions Return in about 1 month (around 10/11/2018), or if symptoms worsen or fail to improve, for BP re-check. Patient Instructions High Blood Pressure: Care Instructions Your Care Instructions If your blood pressure is usually above 130/80, you have high blood pressure, or hypertension. That means the top number is 130 or higher or the bottom number is 80 or higher, or both. Despite what a lot of people think, high blood pressure usually doesn't cause headaches or make you feel dizzy or lightheaded. It usually has no symptoms. But it does increase your risk for heart attack, stroke, and kidney or eye damage. The higher your blood pressure, the more your risk increases. Your doctor will give you a goal for your blood pressure. Your goal will be based on your health and your age. Lifestyle changes, such as eating healthy and being active, are always important to help lower blood pressure. You might also take medicine to reach your blood pressure goal. 
Follow-up care is a key part of your treatment and safety. Be sure to make and go to all appointments, and call your doctor if you are having problems. It's also a good idea to know your test results and keep a list of the medicines you take. How can you care for yourself at home? Medical treatment · If you stop taking your medicine, your blood pressure will go back up. You may take one or more types of medicine to lower your blood pressure. Be safe with medicines. Take your medicine exactly as prescribed. Call your doctor if you think you are having a problem with your medicine. · Talk to your doctor before you start taking aspirin every day. Aspirin can help certain people lower their risk of a heart attack or stroke. But taking aspirin isn't right for everyone, because it can cause serious bleeding. · See your doctor regularly. You may need to see the doctor more often at first or until your blood pressure comes down. · If you are taking blood pressure medicine, talk to your doctor before you take decongestants or anti-inflammatory medicine, such as ibuprofen. Some of these medicines can raise blood pressure. · Learn how to check your blood pressure at home. Lifestyle changes · Stay at a healthy weight. This is especially important if you put on weight around the waist. Losing even 10 pounds can help you lower your blood pressure. · If your doctor recommends it, get more exercise. Walking is a good choice. Bit by bit, increase the amount you walk every day. Try for at least 30 minutes on most days of the week. You also may want to swim, bike, or do other activities. · Avoid or limit alcohol. Talk to your doctor about whether you can drink any alcohol.  
· Try to limit how much sodium you eat to less than 2,300 milligrams (mg) a day. Your doctor may ask you to try to eat less than 1,500 mg a day. · Eat plenty of fruits (such as bananas and oranges), vegetables, legumes, whole grains, and low-fat dairy products. · Lower the amount of saturated fat in your diet. Saturated fat is found in animal products such as milk, cheese, and meat. Limiting these foods may help you lose weight and also lower your risk for heart disease. · Do not smoke. Smoking increases your risk for heart attack and stroke. If you need help quitting, talk to your doctor about stop-smoking programs and medicines. These can increase your chances of quitting for good. When should you call for help? Call 911 anytime you think you may need emergency care. This may mean having symptoms that suggest that your blood pressure is causing a serious heart or blood vessel problem. Your blood pressure may be over 180/110. 
 For example, call 911 if: 
  · You have symptoms of a heart attack. These may include: ¨ Chest pain or pressure, or a strange feeling in the chest. 
¨ Sweating. ¨ Shortness of breath. ¨ Nausea or vomiting. ¨ Pain, pressure, or a strange feeling in the back, neck, jaw, or upper belly or in one or both shoulders or arms. ¨ Lightheadedness or sudden weakness. ¨ A fast or irregular heartbeat.  
  · You have symptoms of a stroke. These may include: 
¨ Sudden numbness, tingling, weakness, or loss of movement in your face, arm, or leg, especially on only one side of your body. ¨ Sudden vision changes. ¨ Sudden trouble speaking. ¨ Sudden confusion or trouble understanding simple statements. ¨ Sudden problems with walking or balance. ¨ A sudden, severe headache that is different from past headaches.  
  · You have severe back or belly pain.  
 Do not wait until your blood pressure comes down on its own.  Get help right away. 
 Call your doctor now or seek immediate care if: 
  · Your blood pressure is much higher than normal (such as 180/110 or higher), but you don't have symptoms.  
  · You think high blood pressure is causing symptoms, such as: ¨ Severe headache. ¨ Blurry vision.  
 Watch closely for changes in your health, and be sure to contact your doctor if: 
  · Your blood pressure measures 140/90 or higher at least 2 times. That means the top number is 140 or higher or the bottom number is 90 or higher, or both.  
  · You think you may be having side effects from your blood pressure medicine.  
  · Your blood pressure is usually normal, but it goes above normal at least 2 times. Where can you learn more? Go to http://prabhjot-willie.info/. Enter Y206 in the search box to learn more about \"High Blood Pressure: Care Instructions. \" Current as of: December 6, 2017 Content Version: 11.7 © 9468-0414 [email or web address]. Care instructions adapted under license by [employer] (which disclaims liability or warranty for this information). If you have questions about a medical condition or this instruction, always ask your healthcare professional. [address] any warranty or liability for your use of this information. Introducing [facility]! Tonia Mattson introduces Bizimply patient portal. Now you can access parts of your medical record, email your doctor's office, and request medication refills online. 1. In your internet browser, go to [email or web address] 2. Click on the First Time User? Click Here link in the Sign In box. You will see the New Member Sign Up page. 3. Enter your Bizimply Access Code exactly as it appears below. You will not need to use this code after youve completed the sign-up process. If you do not sign up before the expiration date, you must request a new code. · Bizimply Access Code: [email or web address] Expires: 9/18/2018  4:50 PM 
 
4.  Enter the last four digits of your Social Security Number (xxxx) and Date of Birth (mm/dd/yyyy) as indicated and click Submit. You will be taken to the next sign-up page. 5. Create a Seelio ID. This will be your Seelio login ID and cannot be changed, so think of one that is secure and easy to remember. 6. Create a Seelio password. You can change your password at any time. 7. Enter your Password Reset Question and Answer. This can be used at a later time if you forget your password. 8. Enter your e-mail address. You will receive e-mail notification when new information is available in [address] 9. Click Sign Up. You can now view and download portions of your medical record. 10. Click the Download Summary menu link to download a portable copy of your medical information. If you have questions, please visit the Frequently Asked Questions section of the Seelio website. Remember, Seelio is NOT to be used for urgent needs. For medical emergencies, dial 911. Now available from your iPhone and Android! Please provide this summary of care documentation to your next provider. Your primary care clinician is listed as [address]. If you have any questions after today's visit, please call [phone].

## 2018-09-11 NOTE — PATIENT INSTRUCTIONS

## 2018-09-11 NOTE — PROGRESS NOTES
Subjective:     Jona Contreras is a 78 y.o. male who presents for follow up of hypertension. Diet and Lifestyle: generally follows a low fat low cholesterol diet  Home BP Monitoring: is not well controlled at home, ranging 180's/90's    Cardiovascular ROS: taking medications as instructed, no medication side effects noted, no TIA's, no chest pain on exertion, no dyspnea on exertion, no swelling of ankles. New concerns:   Pt states that his BP is still running high. Pt was seen last week by Dr Efrain Orellana for elevated BP's, and his lisinopril was increased from 10 mg to 20 mg. He has been taking this, as well as his metoprolol, and BP is still high. BP is ranging around 180-190/'s. He was seen in ER on 9/7, for elevated BP as well. EKG and chest x-ray were normal, as were labs. Pt states that he does not feel anxious (Dr Efrain Orellana believed that this was contributing to his elevated BP), and believes that his BP is just not responding to the medication like it used to. Pt denies any chest pain or shortness of breath. He does have headaches at time, mostly at night. Patient Active Problem List    Diagnosis Date Noted    Anxiety 09/05/2018    Tinnitus of left ear 01/30/2018    Essential hypertension 01/30/2018    Pure hypercholesterolemia 01/30/2018     Current Outpatient Prescriptions   Medication Sig Dispense Refill    amLODIPine (NORVASC) 5 mg tablet Take 1 Tab by mouth daily. 30 Tab 0    fluticasone (FLONASE) 50 mcg/actuation nasal spray       lisinopril (PRINIVIL, ZESTRIL) 20 mg tablet Take 1 Tab by mouth nightly for 90 days. 90 Tab 1    busPIRone (BUSPAR) 7.5 mg tablet Take 1 Tab by mouth two (2) times a day for 30 days. 60 Tab 5    diclofenac EC (VOLTAREN) 50 mg EC tablet Take 1 Tab by mouth two (2) times daily as needed. 60 Tab 2    metoprolol succinate (TOPROL-XL) 25 mg XL tablet Take 25 mg by mouth daily.  aspirin delayed-release 81 mg tablet Take  by mouth daily.       multivitamin, tx-iron-ca-min (THERA-M W/ IRON) 9 mg iron-400 mcg tab tablet Take 1 Tab by mouth daily.  BIOFLAV,LEMON/VIT BCOMP,C (LIPOFLAVONOID PO) Take 1 Tab by mouth once over twenty-four (24) hours. Allergies   Allergen Reactions    Losartan Other (comments)     Shaking, feeling unbalanced. Past Medical History:   Diagnosis Date    Diverticulitis     High cholesterol     Hyperlipidemia     Hypertension     Irregular heart beat     Tinnitus      History reviewed. No pertinent surgical history.   Family History   Problem Relation Age of Onset    Hypertension Mother     Stroke Father     Diabetes Sister     Hypertension Sister      Social History   Substance Use Topics    Smoking status: Never Smoker    Smokeless tobacco: Never Used    Alcohol use 1.8 - 2.4 oz/week     3 - 4 Standard drinks or equivalent per week      Comment: socially        Lab Results  Component Value Date/Time   WBC 3.6 (L) 09/07/2018 10:50 AM   HGB 13.7 09/07/2018 10:50 AM   Hemoglobin (POC) 15.6 12/03/2017 10:40 AM   HCT 41.6 09/07/2018 10:50 AM   Hematocrit (POC) 46 12/03/2017 10:40 AM   PLATELET 415 (L) 99/40/8271 10:50 AM   MCV 95.6 09/07/2018 10:50 AM     Lab Results  Component Value Date/Time   Cholesterol, total 135 01/30/2018 11:56 AM   HDL Cholesterol 41 01/30/2018 11:56 AM   LDL, calculated 68 01/30/2018 11:56 AM   LDL-C, External 67 06/06/2018   Triglyceride 128 01/30/2018 11:56 AM     Lab Results  Component Value Date/Time   GFR est non-AA 54 (L) 09/07/2018 10:50 AM   GFRNA, POC 59 (L) 12/03/2017 10:40 AM   GFR est AA >60 09/07/2018 10:50 AM   GFRAA, POC >60 12/03/2017 10:40 AM   Creatinine 1.29 09/07/2018 10:50 AM   Creatinine (POC) 1.2 12/03/2017 10:40 AM   BUN 13 09/07/2018 10:50 AM   BUN (POC) 12 12/03/2017 10:40 AM   Sodium 142 09/07/2018 10:50 AM   Sodium (POC) 142 12/03/2017 10:40 AM   Potassium 4.0 09/07/2018 10:50 AM   Potassium (POC) 3.9 12/03/2017 10:40 AM   Chloride 106 09/07/2018 10:50 AM Chloride (POC) 104 12/03/2017 10:40 AM   CO2 27 09/07/2018 10:50 AM   Magnesium 1.8 03/31/2018 07:33 AM        Review of Systems, additional:  Pertinent items are noted in HPI. Objective:     Visit Vitals    BP (!) 190/98 (BP 1 Location: Right arm, BP Patient Position: Sitting)    Pulse 64    Temp 98.7 °F (37.1 °C) (Oral)    Resp 18    Ht 5' 11\" (1.803 m)    Wt 190 lb (86.2 kg)    SpO2 97%    BMI 26.5 kg/m2     Appearance: alert, well appearing, and in no distress. General exam: [facility] exam BP noted to be severely elevated today in office, S1, S2 normal, no gallop, no murmur, chest clear, no JVD, no HSM, no edema. Lab review: labs are reviewed, up to date and normal.     Assessment/Plan:     hypertension poorly controlled. lab results and schedule of future lab studies reviewed with patient  orders and follow up as documented in patient record  reviewed diet, exercise and weight control. ICD-10-CM ICD-9-CM    1. Essential hypertension I10 401.9 amLODIPine (NORVASC) 5 mg tablet     Will add Norvasc to his current regimen. Should return to office in 1 month, for BP re-check, or beforehand with continued elevation in BP. Should ALWAYS call 911 or go to the ER with ANY chest pain, shortness of breath, etc.    Pt informed to return to office with worsening of symptoms, or PRN with any questions or concerns. Pt verbalizes understanding of plan of care and denies further questions or concerns at this time.

## 2018-09-15 DIAGNOSIS — F41.9 ANXIETY: ICD-10-CM

## 2018-09-16 RX ORDER — BUSPIRONE HYDROCHLORIDE 5 MG/1
TABLET ORAL
Qty: 60 TAB | Refills: 0 | OUTPATIENT
Start: 2018-09-16

## 2018-09-21 ENCOUNTER — OFFICE VISIT (OUTPATIENT)
Dept: FAMILY MEDICINE CLINIC | Age: 78
End: 2018-09-21

## 2018-09-21 VITALS
HEIGHT: 71 IN | WEIGHT: 189.4 LBS | BODY MASS INDEX: 26.52 KG/M2 | SYSTOLIC BLOOD PRESSURE: 142 MMHG | OXYGEN SATURATION: 100 % | RESPIRATION RATE: 14 BRPM | HEART RATE: 60 BPM | TEMPERATURE: 98.1 F | DIASTOLIC BLOOD PRESSURE: 82 MMHG

## 2018-09-21 DIAGNOSIS — F41.9 ANXIETY: ICD-10-CM

## 2018-09-21 DIAGNOSIS — Z23 ENCOUNTER FOR IMMUNIZATION: ICD-10-CM

## 2018-09-21 DIAGNOSIS — K21.9 GASTROESOPHAGEAL REFLUX DISEASE WITHOUT ESOPHAGITIS: Primary | ICD-10-CM

## 2018-09-21 RX ORDER — BUSPIRONE HYDROCHLORIDE 5 MG/1
5 TABLET ORAL 2 TIMES DAILY
Qty: 180 TAB | Refills: 1 | Status: SHIPPED | OUTPATIENT
Start: 2018-09-21 | End: 2018-12-12 | Stop reason: SDUPTHER

## 2018-09-21 RX ORDER — ATORVASTATIN CALCIUM 10 MG/1
10 TABLET, FILM COATED ORAL DAILY
COMMUNITY
Start: 2018-08-11 | End: 2018-11-05 | Stop reason: SDUPTHER

## 2018-09-21 NOTE — PATIENT INSTRUCTIONS
Vaccine Information Statement    Influenza (Flu) Vaccine (Inactivated or Recombinant): What you need to know    Many Vaccine Information Statements are available in Vietnamese and other languages. See www.immunize.org/vis  Hojas de Información Sobre Vacunas están disponibles en Español y en muchos otros idiomas. Visite www.immunize.org/vis    1. Why get vaccinated? Influenza (flu) is a contagious disease that spreads around the [place] every year, usually between October and May. Flu is caused by influenza viruses, and is spread mainly by coughing, sneezing, and close contact. Anyone can get flu. Flu strikes suddenly and can last several days. Symptoms vary by age, but can include:   fever/chills   sore throat   muscle aches   fatigue   cough   headache    runny or stuffy nose    Flu can also lead to pneumonia and blood infections, and cause diarrhea and seizures in children. If you have a medical condition, such as heart or lung disease, flu can make it worse. Flu is more dangerous for some people. Infants and young children, people 72years of age and older, pregnant women, and people with certain health conditions or a weakened immune system are at greatest risk. Each year thousands of people in the [facility] die from flu, and many more are hospitalized. Flu vaccine can:   keep you from getting flu,   make flu less severe if you do get it, and   keep you from spreading flu to your family and other people. 2. Inactivated and recombinant flu vaccines    A dose of flu vaccine is recommended every flu season. Children 6 months through 6years of age may need two doses during the same flu season. Everyone else needs only one dose each flu season.        Some inactivated flu vaccines contain a very small amount of a mercury-based preservative called thimerosal. Studies have not shown thimerosal in vaccines to be harmful, but flu vaccines that do not contain thimerosal are available. There is no live flu virus in flu shots. They cannot cause the flu. There are many flu viruses, and they are always changing. Each year a new flu vaccine is made to protect against three or four viruses that are likely to cause disease in the upcoming flu season. But even when the vaccine doesnt exactly match these viruses, it may still provide some protection    Flu vaccine cannot prevent:   flu that is caused by a virus not covered by the vaccine, or   illnesses that look like flu but are not. It takes about 2 weeks for protection to develop after vaccination, and protection lasts through the flu season. 3. Some people should not get this vaccine    Tell the person who is giving you the vaccine:     If you have any severe, life-threatening allergies. If you ever had a life-threatening allergic reaction after a dose of flu vaccine, or have a severe allergy to any part of this vaccine, you may be advised not to get vaccinated. Most, but not all, types of flu vaccine contain a small amount of egg protein.  If you ever had Guillain-Barré Syndrome (also called GBS). Some people with a history of GBS should not get this vaccine. This should be discussed with your doctor.  If you are not feeling well. It is usually okay to get flu vaccine when you have a mild illness, but you might be asked to come back when you feel better. 4. Risks of a vaccine reaction    With any medicine, including vaccines, there is a chance of reactions. These are usually mild and go away on their own, but serious reactions are also possible. Most people who get a flu shot do not have any problems with it.      Minor problems following a flu shot include:    soreness, redness, or swelling where the shot was given     hoarseness   sore, red or itchy eyes   cough   fever   aches   headache   itching   fatigue  If these problems occur, they usually begin soon after the shot and last 1 or 2 days. More serious problems following a flu shot can include the following:     There may be a small increased risk of Guillain-Barré Syndrome (GBS) after inactivated flu vaccine. This risk has been estimated at 1 or 2 additional cases per million people vaccinated. This is much lower than the risk of severe complications from flu, which can be prevented by flu vaccine.  Young children who get the flu shot along with pneumococcal vaccine (PCV13) and/or DTaP vaccine at the same time might be slightly more likely to have a seizure caused by fever. Ask your doctor for more information. Tell your doctor if a child who is getting flu vaccine has ever had a seizure. Problems that could happen after any injected vaccine:      People sometimes faint after a medical procedure, including vaccination. Sitting or lying down for about 15 minutes can help prevent fainting, and injuries caused by a fall. Tell your doctor if you feel dizzy, or have vision changes or ringing in the ears.  Some people get severe pain in the shoulder and have difficulty moving the arm where a shot was given. This happens very rarely.  Any medication can cause a severe allergic reaction. Such reactions from a vaccine are very rare, estimated at about 1 in a million doses, and would happen within a few minutes to a few hours after the vaccination. As with any medicine, there is a very remote chance of a vaccine causing a serious injury or death. The safety of vaccines is always being monitored. For more information, visit: www.cdc.gov/vaccinesafety/    5. What if there is a serious reaction? What should I look for?  Look for anything that concerns you, such as signs of a severe allergic reaction, very high fever, or unusual behavior.     Signs of a severe allergic reaction can include hives, swelling of the face and throat, difficulty breathing, a fast heartbeat, dizziness, and weakness - usually within a few minutes to a few hours after the vaccination. What should I do?  If you think it is a severe allergic reaction or other emergency that cant wait, call 9-1-1 and get the person to the nearest hospital. Otherwise, call your doctor.  Reactions should be reported to the Vaccine Adverse Event Reporting System (VAERS). Your doctor should file this report, or you can do it yourself through  the VAERS web site at www.vaers. [facility].gov, or by calling 9-149.766.3306. VAERS does not give medical advice. 6. The National Vaccine Injury Compensation Program    The [facility] Vaccine Injury Compensation Program (VICP) is a federal program that was created to compensate people who may have been injured by certain vaccines. Persons who believe they may have been injured by a vaccine can learn about the program and about filing a claim by calling 0-835.760.8045 or visiting the [employer] website at www.[facility].gov/vaccinecompensation. There is a time limit to file a claim for compensation. 7. How can I learn more?  Ask your healthcare provider. He or she can give you the vaccine package insert or suggest other sources of information.  Call your local or state health department.  Contact the Centers for Disease Control and Prevention (CDC):  - Call 2-396.742.8459 (1-800-CDC-INFO) or  - Visit CDCs website at www.cdc.gov/flu    Vaccine Information Statement   Inactivated Influenza Vaccine   8/7/2015  42 ALEC Harvey 117RE-89    Department of Health and Human Services  Centers for Disease Control and Prevention    Office Use Only

## 2018-09-21 NOTE — PROGRESS NOTES
Timmy Siddiqi is a 78 y.o. male      Chief Complaint   Patient presents with    Chest Congestion     X 1 week    Other     Pt stated that his stomach is growling a lot. 1. Have you been to the ER, urgent care clinic since your last visit? Hospitalized since your last visit? No    2. Have you seen or consulted any other health care providers outside of the [address] [place] [address] since your last visit? Include any pap smears or colon screening.  No

## 2018-09-21 NOTE — PROGRESS NOTES
HISTORY OF PRESENT ILLNESS  Gilles Ashraf is a 78 y.o. male. HPI  Pt presents with \"stomach upset\"    Pt states that his stomach \"makes noises in the morning\". Pt states that it is growling and feels like everything is moving in the stomach. No abdominal pain  No vomiting  No diarrhea, no blood in stool, no change in bowel or bladder habits  Pt states that he has tried [employer], and it seems to help at times. Pt came to the office at the end of July for similar symptoms, and lab and stool samples returned normal.  Pt was informed to be seen by GI, but he has yet to do so, as he states that \"there really was no reason to, as his symptoms went away\". Pt would like a flu vaccine at this time. In addition, patient would like to go back to the 5 mg dose of Buspar. Pt states that Dr Adelia Fernandez increased the Buspar from 5 mg to 7.5 mg, and he feels like the increase in medication makes him sleepy. He would like to decrease to 5 mg, as this was helping his anxiety without any negative side effects. Review of Systems   Constitutional: Negative for fever. HENT: Negative for congestion. Respiratory: Negative for cough. Gastrointestinal: Negative for abdominal pain, constipation, diarrhea and vomiting. Physical Exam   Constitutional: He is oriented to person, place, and time. He appears well-developed and well-nourished. HENT:   Head: Normocephalic and atraumatic. Cardiovascular: Normal rate, regular rhythm and normal heart sounds. Pulmonary/Chest: Effort normal and breath sounds normal.   Abdominal: Soft. Bowel sounds are normal. He exhibits no distension and no mass. There is no tenderness. There is no rebound and no guarding. Neurological: He is alert and oriented to person, place, and time. Skin: Skin is warm and dry. Psychiatric: He has a normal mood and affect. His behavior is normal.       ASSESSMENT and PLAN    ICD-10-CM ICD-9-CM    1.  Gastroesophageal reflux disease without esophagitis K21.9 530.81    2. Encounter for immunization Z23 V03.89 INFLUENZA VIRUS VACCINE, HIGH DOSE SEASONAL, PRESERVATIVE FREE   3. Anxiety F41.9 300.00 busPIRone (BUSPAR) 5 mg tablet     Informed patient that it is hard to say what is going on with his stomach, but my advice again would be to be seen by GI. Educated about calling and making an appointment, should symptoms remain. In the mean time, informed patient that he can try OTC omeprazole, to see if this aids in symptoms. Vaccine and VIS given. Pt informed to return to office with worsening of symptoms, or PRN with any questions or concerns. Pt verbalizes understanding of plan of care and denies further questions or concerns at this time.

## 2018-09-21 NOTE — MR AVS SNAPSHOT
[address] [address] [address] [facility] 
[phone] Patient: Gris Reyna MRN: [id number] NI Visit Information Date & Time Provider Department Dept. Phone Encounter #  
 2018  9:00 AM Brandon Ng [phone] [id number] Follow-up Instructions Return if symptoms worsen or fail to improve. Upcoming Health Maintenance Date Due DTaP/Tdap/Td series (1 - Tdap) 1961 ZOSTER VACCINE AGE 60> 10/24/2002 GLAUCOMA SCREENING Q2Y 2007 Influenza Age 5 to Adult 2018 MEDICARE YEARLY EXAM 2018 Pneumococcal 65+ Low/Medium Risk (2 of 2 - PCV13) 2019 Allergies as of 2018  Review Complete On: 2018 By: Leny Sharpe NP Severity Noted Reaction Type Reactions Losartan  2018    Other (comments) Shaking, feeling unbalanced. Current Immunizations  Never Reviewed Name Date Influenza High Dose Vaccine PF  Incomplete Pneumococcal Polysaccharide (PPSV-23) 2018 Not reviewed this visit You Were Diagnosed With   
  
 Codes Comments Gastroesophageal reflux disease without esophagitis    -  Primary ICD-10-CM: K21.9 ICD-9-CM: 530.81 Encounter for immunization     ICD-10-CM: F05 ICD-9-CM: V03.89 Anxiety     ICD-10-CM: F41.9 ICD-9-CM: 300.00 Vitals BP Pulse Temp Resp Height(growth percentile) Weight(growth percentile) 142/82 60 98.1 °F (36.7 °C) (Oral) 14 5' 11\" (1.803 m) 189 lb 6.4 oz (85.9 kg) SpO2 BMI Smoking Status 100% 26.42 kg/m2 Never Smoker Vitals History BMI and BSA Data Body Mass Index Body Surface Area  
 26.42 kg/m 2 2.07 m 2 Preferred Pharmacy Pharmacy Name Phone [id number] [address] [phone] Your Updated Medication List  
  
   
 This list is accurate as of 9/21/18  9:00 AM.  Always use your most recent med list. amLODIPine 5 mg tablet Commonly known as:  Horris Bills Take 1 Tab by mouth daily. aspirin delayed-release 81 mg tablet Take  by mouth daily. atorvastatin 10 mg tablet Commonly known as:  LIPITOR Take 10 mg by mouth daily. busPIRone 5 mg tablet Commonly known as:  BUSPAR Take 1 Tab by mouth two (2) times a day. diclofenac EC 50 mg EC tablet Commonly known as:  VOLTAREN Take 1 Tab by mouth two (2) times daily as needed. fluticasone 50 mcg/actuation nasal spray Commonly known as:  Jo-Ann Torres 2 Sprays by Both Nostrils route daily. LIPOFLAVONOID PO Take 1 Tab by mouth once over twenty-four (24) hours. lisinopril 20 mg tablet Commonly known as:  Thersia Kubas Take 1 Tab by mouth nightly for 90 days. metoprolol succinate 25 mg XL tablet Commonly known as:  TOPROL-XL Take 25 mg by mouth daily. multivitamin, tx-iron-ca-min 9 mg iron-400 mcg Tab tablet Commonly known as:  THERA-M w/ IRON Take 1 Tab by mouth daily. Prescriptions Sent to Pharmacy Refills  
 busPIRone (BUSPAR) 5 mg tablet 1 Sig: Take 1 Tab by mouth two (2) times a day. Class: Normal  
 Pharmacy: [facility] [address], [address] [place] [address] #: [phone] Route: Oral  
  
We Performed the Following INFLUENZA VIRUS VACCINE, HIGH DOSE SEASONAL, PRESERVATIVE FREE [12232 CPT(R)] Follow-up Instructions Return if symptoms worsen or fail to improve. Patient Instructions Vaccine Information Statement Influenza (Flu) Vaccine (Inactivated or Recombinant): What you need to know Many Vaccine Information Statements are available in Samoan and other languages. See www.immunize.org/vis Hojas de Información Sobre Vacunas están disponibles en Español y en muchos otros idiomas. Visite www.immunize.org/vis 1. Why get vaccinated? Influenza (flu) is a contagious disease that spreads around the [place] every year, usually between October and May. Flu is caused by influenza viruses, and is spread mainly by coughing, sneezing, and close contact. Anyone can get flu. Flu strikes suddenly and can last several days. Symptoms vary by age, but can include: 
 fever/chills  sore throat  muscle aches  fatigue  cough  headache  runny or stuffy nose Flu can also lead to pneumonia and blood infections, and cause diarrhea and seizures in children. If you have a medical condition, such as heart or lung disease, flu can make it worse. Flu is more dangerous for some people. Infants and young children, people 72years of age and older, pregnant women, and people with certain health conditions or a weakened immune system are at greatest risk. Each year thousands of people in the [facility] die from flu, and many more are hospitalized. Flu vaccine can: 
 keep you from getting flu, 
 make flu less severe if you do get it, and 
 keep you from spreading flu to your family and other people. 2. Inactivated and recombinant flu vaccines A dose of flu vaccine is recommended every flu season. Children 6 months through 6years of age may need two doses during the same flu season. Everyone else needs only one dose each flu season. Some inactivated flu vaccines contain a very small amount of a mercury-based preservative called thimerosal. Studies have not shown thimerosal in vaccines to be harmful, but flu vaccines that do not contain thimerosal are available. There is no live flu virus in flu shots. They cannot cause the flu. There are many flu viruses, and they are always changing. Each year a new flu vaccine is made to protect against three or four viruses that are likely to cause disease in the upcoming flu season.  But even when the vaccine doesnt exactly match these viruses, it may still provide some protection Flu vaccine cannot prevent: 
 flu that is caused by a virus not covered by the vaccine, or 
 illnesses that look like flu but are not. It takes about 2 weeks for protection to develop after vaccination, and protection lasts through the flu season. 3. Some people should not get this vaccine Tell the person who is giving you the vaccine:  If you have any severe, life-threatening allergies. If you ever had a life-threatening allergic reaction after a dose of flu vaccine, or have a severe allergy to any part of this vaccine, you may be advised not to get vaccinated. Most, but not all, types of flu vaccine contain a small amount of egg protein.  If you ever had Guillain-Barré Syndrome (also called GBS). Some people with a history of GBS should not get this vaccine. This should be discussed with your doctor.  If you are not feeling well. It is usually okay to get flu vaccine when you have a mild illness, but you might be asked to come back when you feel better. 4. Risks of a vaccine reaction With any medicine, including vaccines, there is a chance of reactions. These are usually mild and go away on their own, but serious reactions are also possible. Most people who get a flu shot do not have any problems with it. Minor problems following a flu shot include:  
 soreness, redness, or swelling where the shot was given  hoarseness  sore, red or itchy eyes  cough  fever  aches  headache  itching  fatigue If these problems occur, they usually begin soon after the shot and last 1 or 2 days. More serious problems following a flu shot can include the following:  There may be a small increased risk of Guillain-Barré Syndrome (GBS) after inactivated flu vaccine.   This risk has been estimated at 1 or 2 additional cases per million people vaccinated. This is much lower than the risk of severe complications from flu, which can be prevented by flu vaccine.  Young children who get the flu shot along with pneumococcal vaccine (PCV13) and/or DTaP vaccine at the same time might be slightly more likely to have a seizure caused by fever. Ask your doctor for more information. Tell your doctor if a child who is getting flu vaccine has ever had a seizure. Problems that could happen after any injected vaccine:  People sometimes faint after a medical procedure, including vaccination. Sitting or lying down for about 15 minutes can help prevent fainting, and injuries caused by a fall. Tell your doctor if you feel dizzy, or have vision changes or ringing in the ears.  Some people get severe pain in the shoulder and have difficulty moving the arm where a shot was given. This happens very rarely.  Any medication can cause a severe allergic reaction. Such reactions from a vaccine are very rare, estimated at about 1 in a million doses, and would happen within a few minutes to a few hours after the vaccination. As with any medicine, there is a very remote chance of a vaccine causing a serious injury or death. The safety of vaccines is always being monitored. For more information, visit: www.cdc.gov/vaccinesafety/ 
 
5. What if there is a serious reaction? What should I look for?  Look for anything that concerns you, such as signs of a severe allergic reaction, very high fever, or unusual behavior. Signs of a severe allergic reaction can include hives, swelling of the face and throat, difficulty breathing, a fast heartbeat, dizziness, and weakness  usually within a few minutes to a few hours after the vaccination. What should I do?  
 
 If you think it is a severe allergic reaction or other emergency that cant wait, call 9-1-1 and get the person to the nearest hospital. Otherwise, call your doctor.  Reactions should be reported to the Vaccine Adverse Event Reporting System (VAERS). Your doctor should file this report, or you can do it yourself through  the VAERS web site at www.vaers. hhs.gov, or by calling 5-352.113.3111. VAERS does not give medical advice. 6. The National Vaccine Injury Compensation Program 
 
The [facility] Vaccine Injury Compensation Program (VICP) is a federal program that was created to compensate people who may have been injured by certain vaccines. Persons who believe they may have been injured by a vaccine can learn about the program and about filing a claim by calling 8-599.742.1833 or visiting the [employer] website at www.[facility].gov/vaccinecompensation. There is a time limit to file a claim for compensation. 7. How can I learn more?  Ask your healthcare provider. He or she can give you the vaccine package insert or suggest other sources of information.  Call your local or state health department.  Contact the Centers for Disease Control and Prevention (CDC): 
- Call 4-966.416.9789 (1-800-CDC-INFO) or 
- Visit CDCs website at www.cdc.gov/flu Vaccine Information Statement Inactivated Influenza Vaccine 8/7/2015 
42 ALEC Herrera [id number] Department of [facility] and [employer] Centers for Disease Control and Prevention Office Use Only Introducing [facility]! [facility] introduces [employer] patient portal. Now you can access parts of your medical record, email your doctor's office, and request medication refills online. 1. In your internet browser, go to [email or web address] 2. Click on the First Time User? Click Here link in the Sign In box. You will see the New Member Sign Up page. 3. Enter your [employer] Access Code exactly as it appears below. You will not need to use this code after youve completed the sign-up process.  If you do not sign up before the expiration date, you must request a new code. 
 
· SECU4 Access Code: [email or web address] Expires: 12/20/2018  9:00 AM 
 
4. Enter the last four digits of your Social Security Number (xxxx) and Date of Birth (mm/dd/yyyy) as indicated and click Submit. You will be taken to the next sign-up page. 5. Create a [employer] ID. This will be your SECU4 login ID and cannot be changed, so think of one that is secure and easy to remember. 6. Create a SECU4 password. You can change your password at any time. 7. Enter your Password Reset Question and Answer. This can be used at a later time if you forget your password. 8. Enter your e-mail address. You will receive e-mail notification when new information is available in [address] 9. Click Sign Up. You can now view and download portions of your medical record. 10. Click the Download Summary menu link to download a portable copy of your medical information. If you have questions, please visit the Frequently Asked Questions section of the SECU4 website. Remember, [employer]U4 is NOT to be used for urgent needs. For medical emergencies, dial 911. Now available from your iPhone and Android! Please provide this summary of care documentation to your next provider. Your primary care clinician is listed as Marlin Gupta. If you have any questions after today's visit, please call [phone].

## 2018-10-02 DIAGNOSIS — I10 ESSENTIAL HYPERTENSION: ICD-10-CM

## 2018-10-02 RX ORDER — METOPROLOL SUCCINATE 25 MG/1
25 TABLET, EXTENDED RELEASE ORAL DAILY
Qty: 90 TAB | Refills: 1 | Status: SHIPPED | OUTPATIENT
Start: 2018-10-02 | End: 2018-10-31

## 2018-10-02 RX ORDER — AMLODIPINE BESYLATE 5 MG/1
5 TABLET ORAL DAILY
Qty: 90 TAB | Refills: 1 | Status: SHIPPED | OUTPATIENT
Start: 2018-10-02 | End: 2019-04-08 | Stop reason: SDUPTHER

## 2018-10-18 ENCOUNTER — HOSPITAL ENCOUNTER (EMERGENCY)
Age: 78
Discharge: HOME | End: 2018-10-18
Attending: EMERGENCY MEDICINE
Payer: MEDICARE

## 2018-10-18 ENCOUNTER — APPOINTMENT (OUTPATIENT)
Dept: CT IMAGING | Age: 78
End: 2018-10-18
Attending: EMERGENCY MEDICINE
Payer: MEDICARE

## 2018-10-18 VITALS
HEART RATE: 79 BPM | BODY MASS INDEX: 26.6 KG/M2 | TEMPERATURE: 97.9 F | HEIGHT: 71 IN | RESPIRATION RATE: 16 BRPM | DIASTOLIC BLOOD PRESSURE: 66 MMHG | SYSTOLIC BLOOD PRESSURE: 120 MMHG | OXYGEN SATURATION: 97 % | WEIGHT: 190 LBS

## 2018-10-18 DIAGNOSIS — R03.0 ELEVATED BLOOD PRESSURE READING: ICD-10-CM

## 2018-10-18 DIAGNOSIS — R10.13 ABDOMINAL PAIN, EPIGASTRIC: Primary | ICD-10-CM

## 2018-10-18 LAB
ALBUMIN SERPL-MCNC: 3.8 G/DL (ref 3.5–5)
ALBUMIN/GLOB SERPL: 1 {RATIO} (ref 1.1–2.2)
ALP SERPL-CCNC: 71 U/L (ref 45–117)
ALT SERPL-CCNC: 39 U/L (ref 12–78)
ANION GAP SERPL CALC-SCNC: 8 MMOL/L (ref 5–15)
AST SERPL-CCNC: 30 U/L (ref 15–37)
BASOPHILS # BLD: 0 K/UL (ref 0–0.1)
BASOPHILS NFR BLD: 1 % (ref 0–1)
BILIRUB SERPL-MCNC: 0.7 MG/DL (ref 0.2–1)
BUN SERPL-MCNC: 10 MG/DL (ref 6–20)
BUN/CREAT SERPL: 8 (ref 12–20)
CALCIUM SERPL-MCNC: 9.3 MG/DL (ref 8.5–10.1)
CHLORIDE SERPL-SCNC: 102 MMOL/L (ref 97–108)
CO2 SERPL-SCNC: 30 MMOL/L (ref 21–32)
CREAT SERPL-MCNC: 1.19 MG/DL (ref 0.7–1.3)
DIFFERENTIAL METHOD BLD: ABNORMAL
EOSINOPHIL # BLD: 0.2 K/UL (ref 0–0.4)
EOSINOPHIL NFR BLD: 5 % (ref 0–7)
ERYTHROCYTE [DISTWIDTH] IN BLOOD BY AUTOMATED COUNT: 11.7 % (ref 11.5–14.5)
GLOBULIN SER CALC-MCNC: 3.8 G/DL (ref 2–4)
GLUCOSE SERPL-MCNC: 113 MG/DL (ref 65–100)
HCT VFR BLD AUTO: 42.1 % (ref 36.6–50.3)
HGB BLD-MCNC: 13.8 G/DL (ref 12.1–17)
IMM GRANULOCYTES # BLD: 0 K/UL (ref 0–0.04)
IMM GRANULOCYTES NFR BLD AUTO: 1 % (ref 0–0.5)
LIPASE SERPL-CCNC: 159 U/L (ref 73–393)
LYMPHOCYTES # BLD: 1.2 K/UL (ref 0.8–3.5)
LYMPHOCYTES NFR BLD: 28 % (ref 12–49)
MCH RBC QN AUTO: 31.1 PG (ref 26–34)
MCHC RBC AUTO-ENTMCNC: 32.8 G/DL (ref 30–36.5)
MCV RBC AUTO: 94.8 FL (ref 80–99)
MONOCYTES # BLD: 0.4 K/UL (ref 0–1)
MONOCYTES NFR BLD: 8 % (ref 5–13)
NEUTS SEG # BLD: 2.6 K/UL (ref 1.8–8)
NEUTS SEG NFR BLD: 58 % (ref 32–75)
NRBC # BLD: 0 K/UL (ref 0–0.01)
NRBC BLD-RTO: 0 PER 100 WBC
PLATELET # BLD AUTO: 146 K/UL (ref 150–400)
PMV BLD AUTO: 10.4 FL (ref 8.9–12.9)
POTASSIUM SERPL-SCNC: 3.9 MMOL/L (ref 3.5–5.1)
PROT SERPL-MCNC: 7.6 G/DL (ref 6.4–8.2)
RBC # BLD AUTO: 4.44 M/UL (ref 4.1–5.7)
SODIUM SERPL-SCNC: 140 MMOL/L (ref 136–145)
WBC # BLD AUTO: 4.4 K/UL (ref 4.1–11.1)

## 2018-10-18 PROCEDURE — 80053 COMPREHEN METABOLIC PANEL: CPT | Performed by: EMERGENCY MEDICINE

## 2018-10-18 PROCEDURE — 99283 EMERGENCY DEPT VISIT LOW MDM: CPT

## 2018-10-18 PROCEDURE — 74177 CT ABD & PELVIS W/CONTRAST: CPT

## 2018-10-18 PROCEDURE — 74011636320 HC RX REV CODE- 636/320

## 2018-10-18 PROCEDURE — 36415 COLL VENOUS BLD VENIPUNCTURE: CPT | Performed by: EMERGENCY MEDICINE

## 2018-10-18 PROCEDURE — 83690 ASSAY OF LIPASE: CPT | Performed by: EMERGENCY MEDICINE

## 2018-10-18 PROCEDURE — 85025 COMPLETE CBC W/AUTO DIFF WBC: CPT | Performed by: EMERGENCY MEDICINE

## 2018-10-18 RX ORDER — SODIUM CHLORIDE 0.9 % (FLUSH) 0.9 %
5-10 SYRINGE (ML) INJECTION AS NEEDED
Status: DISCONTINUED | OUTPATIENT
Start: 2018-10-18 | End: 2018-10-18 | Stop reason: HOSPADM

## 2018-10-18 RX ORDER — FAMOTIDINE 20 MG/1
20 TABLET, FILM COATED ORAL 2 TIMES DAILY
Qty: 20 TAB | Refills: 0 | Status: SHIPPED | OUTPATIENT
Start: 2018-10-18 | End: 2018-11-02 | Stop reason: SDUPTHER

## 2018-10-18 RX ORDER — SODIUM CHLORIDE 0.9 % (FLUSH) 0.9 %
5-10 SYRINGE (ML) INJECTION EVERY 8 HOURS
Status: DISCONTINUED | OUTPATIENT
Start: 2018-10-18 | End: 2018-10-18 | Stop reason: HOSPADM

## 2018-10-18 RX ADMIN — IOPAMIDOL 98 ML: 755 INJECTION, SOLUTION INTRAVENOUS at 13:21

## 2018-10-18 NOTE — DISCHARGE INSTRUCTIONS
Rest, push clear liquids. [place] foods as tolerated. No greasy or spicy foods. Abdominal Pain: Care Instructions  Your Care Instructions    Abdominal pain has many possible causes. Some aren't serious and get better on their own in a few days. Others need more testing and treatment. If your pain continues or gets worse, you need to be rechecked and may need more tests to find out what is wrong. You may need surgery to correct the problem. Don't ignore new symptoms, such as fever, nausea and vomiting, urination problems, pain that gets worse, and dizziness. These may be signs of a more serious problem. Your doctor may have recommended a follow-up visit in the next 8 to 12 hours. If you are not getting better, you may need more tests or treatment. The doctor has checked you carefully, but problems can develop later. If you notice any problems or new symptoms, get medical treatment right away. Follow-up care is a key part of your treatment and safety. Be sure to make and go to all appointments, and call your doctor if you are having problems. It's also a good idea to know your test results and keep a list of the medicines you take. How can you care for yourself at home? · Rest until you feel better. · To prevent dehydration, drink plenty of fluids, enough so that your urine is light yellow or clear like water. Choose water and other caffeine-free clear liquids until you feel better. If you have kidney, heart, or liver disease and have to limit fluids, talk with your doctor before you increase the amount of fluids you drink. · If your stomach is upset, eat mild foods, such as rice, dry toast or crackers, bananas, and applesauce. Try eating several small meals instead of two or three large ones. · Wait until 48 hours after all symptoms have gone away before you have spicy foods, alcohol, and drinks that contain caffeine. · Do not eat foods that are high in fat.   · Avoid anti-inflammatory medicines such as aspirin, ibuprofen (Advil, Motrin), and naproxen (Aleve). These can cause stomach upset. Talk to your doctor if you take daily aspirin for another health problem. When should you call for help? Call 911 anytime you think you may need emergency care. For example, call if:    · You passed out (lost consciousness).     · You pass maroon or very bloody stools.     · You vomit blood or what looks like coffee grounds.     · You have new, severe belly pain.    Call your doctor now or seek immediate medical care if:    · Your pain gets worse, especially if it becomes focused in one area of your belly.     · You have a new or higher fever.     · Your stools are black and look like tar, or they have streaks of blood.     · You have unexpected vaginal bleeding.     · You have symptoms of a urinary tract infection. These may include:  ? Pain when you urinate. ? Urinating more often than usual.  ? Blood in your urine.     · You are dizzy or lightheaded, or you feel like you may faint.    Watch closely for changes in your health, and be sure to contact your doctor if:    · You are not getting better after 1 day (24 hours). Where can you learn more? Go to http://prabhjot-willie.info/. Enter X992 in the search box to learn more about \"Abdominal Pain: Care Instructions. \"  Current as of: November 20, 2017  Content Version: 11.8  © 7844-1609 [employer]. Care instructions adapted under license by [employer] (which disclaims liability or warranty for this information). If you have questions about a medical condition or this instruction, always ask your healthcare professional. [address] [address] any warranty or liability for your use of this information. Elevated Blood Pressure: Care Instructions  Your Care Instructions    Blood pressure is a measure of how hard the blood pushes against the walls of your arteries.  It's normal for blood pressure to go up and down throughout the day. But if it stays up over time, you have high blood pressure. Two numbers tell you your blood pressure. The first number is the systolic pressure. It shows how hard the blood pushes when your heart is pumping. The second number is the diastolic pressure. It shows how hard the blood pushes between heartbeats, when your heart is relaxed and filling with blood. An ideal blood pressure in adults is less than 120/80 (say \"120 over 80\"). High blood pressure is 140/90 or higher. You have high blood pressure if your top number is 140 or higher or your bottom number is 90 or higher, or both. The main test for high blood pressure is simple, fast, and painless. To diagnose high blood pressure, your doctor will test your blood pressure at different times. After testing your blood pressure, your doctor may ask you to test it again when you are home. If you are diagnosed with high blood pressure, you can work with your doctor to make a long-term plan to manage it. Follow-up care is a key part of your treatment and safety. Be sure to make and go to all appointments, and call your doctor if you are having problems. It's also a good idea to know your test results and keep a list of the medicines you take. How can you care for yourself at home? · Do not smoke. Smoking increases your risk for heart attack and stroke. If you need help quitting, talk to your doctor about stop-smoking programs and medicines. These can increase your chances of quitting for good. · Stay at a healthy weight. · Try to limit how much sodium you eat to less than 2,300 milligrams (mg) a day. Your doctor may ask you to try to eat less than 1,500 mg a day. · Be physically active. Get at least 30 minutes of exercise on most days of the week. Walking is a good choice. You also may want to do other activities, such as running, swimming, cycling, or playing tennis or team sports. · Avoid or limit alcohol.  Talk to your doctor about whether you can drink any alcohol. · Eat plenty of fruits, vegetables, and low-fat dairy products. Eat less saturated and total fats. · Learn how to check your blood pressure at home. When should you call for help? Call your doctor now or seek immediate medical care if:  ? · Your blood pressure is much higher than normal (such as 180/110 or higher). ? · You think high blood pressure is causing symptoms such as:  ¨ Severe headache. ¨ Blurry vision. ? Watch closely for changes in your health, and be sure to contact your doctor if:  ? · You do not get better as expected. Where can you learn more? Go to http://prabhjot-willie.info/. Enter R181 in the search box to learn more about \"Elevated Blood Pressure: Care Instructions. \"  Current as of: September 21, 2016  Content Version: 11.4  © 2140-0772 [employer]. Care instructions adapted under license by [employer] (which disclaims liability or warranty for this information). If you have questions about a medical condition or this instruction, always ask your healthcare professional. [address] any warranty or liability for your use of this information.

## 2018-10-18 NOTE — ED PROVIDER NOTES
Diffuse abd pain; intermittent times 2 weeks; + gas; no N, V, D; appetite ok; eating makes pain worse; hx similar pain 6 months ago - diagnosed with \"bacterial infection\" and took two AB's; thinks it was diverticulitis; last BM normal; no F; no radiation of pain. No urinary sx's. Cash Contreras MD 
 
11:31 AM 
I have evaluated the patient as the Provider in Triage. I have reviewed His vital signs and the triage nurse assessment. I have talked with the patient and any available family and advised that I am the provider in triage and have ordered the appropriate study to initiate their work up based on the clinical presentation during my assessment. I have advised that the patient will be accommodated in the Main ED as soon as possible. I have also requested to contact the triage nurse or myself immediately if the patient experiences any changes in their condition during this brief waiting period. Cash Contreras MD 
 
78 y.o. male with past medical history significant for hypercholesterolemia, irregular heartbeat, diverticulitis, HTN, and tinnitus who presents from home with chief complaint of abdominal pain. Pt reports intermittent epigastric pain for 2 weeks. Pt describes his abdominal pain as \"pressure\" and having increased gas. . Pt states the pain is worse after eating. Pt rates his pain a \"4/10\". Pt also c/o HA this morning. Pt notes he took TUMS and another OTC medication. Pt reports taking BP medications. Pt denies fever, chills, N/V/D, CP, SOB, diaphoresis, dysuria, hematuria, and blood in stool. There are no other acute medical concerns at this time. He has not been to see his pcp for the same. Social hx: Alcohol use. Denies tobacco and drug use. PCP: Marlen Baxter MD 
 
Note written by Juanito Hubbard, as dictated by NHUNG Anglin 12:00 PM 
 
 
 
 
 
The history is provided by the patient. Past Medical History:  
Diagnosis Date  Diverticulitis  High cholesterol  Hyperlipidemia  Hypertension  Irregular heart beat  Tinnitus History reviewed. No pertinent surgical history. Family History:  
Problem Relation Age of Onset  Hypertension Mother  Stroke Father  Diabetes Sister  Hypertension Sister Social History Socioeconomic History  Marital status: SINGLE Spouse name: Not on file  Number of children: Not on file  Years of education: Not on file  Highest education level: Not on file Social Needs  Financial resource strain: Not on file  Food insecurity - worry: Not on file  Food insecurity - inability: Not on file  Transportation needs - medical: Not on file  Transportation needs - non-medical: Not on file Occupational History  Not on file Tobacco Use  Smoking status: Never Smoker  Smokeless tobacco: Never Used Substance and Sexual Activity  Alcohol use: Yes Alcohol/week: 1.8 - 2.4 oz Types: 3 - 4 Standard drinks or equivalent per week Comment: socially  Drug use: No  
 Sexual activity: No  
Other Topics Concern  Not on file Social History Narrative ** Merged History Encounter ** ALLERGIES: Losartan Review of Systems Constitutional: Negative for appetite change, chills, fatigue and fever. HENT: Negative for congestion, ear pain, postnasal drip, rhinorrhea and sore throat. Eyes: Negative for visual disturbance. Respiratory: Negative for cough, shortness of breath and wheezing. Cardiovascular: Negative for chest pain, palpitations and leg swelling. Gastrointestinal: Positive for abdominal pain. Negative for anal bleeding, constipation, diarrhea, nausea and vomiting. Genitourinary: Negative for dysuria and hematuria. Musculoskeletal: Negative for arthralgias and myalgias. Skin: Negative for rash. Allergic/Immunologic: Negative for immunocompromised state. Neurological: Positive for headaches.  Negative for weakness and light-headedness. Patient Vitals for the past 12 hrs: 
 Temp Pulse Resp BP SpO2  
10/18/18 1415    120/66 97 % 10/18/18 1408     97 % 10/18/18 1406    154/73   
10/18/18 1131 97.9 °F (36.6 °C) 79 16 (!) 211/96 99 % Physical Exam  
Constitutional: He appears well-developed and well-nourished. No distress. HENT:  
Head: Normocephalic and atraumatic. Right Ear: External ear normal.  
Left Ear: External ear normal.  
Eyes: EOM are normal. Pupils are equal, round, and reactive to light. Right eye exhibits no discharge. Left eye exhibits no discharge. Neck: Normal range of motion. Neck supple. Cardiovascular: Normal rate, regular rhythm, normal heart sounds and intact distal pulses. Exam reveals no gallop and no friction rub. No murmur heard. Pulmonary/Chest: Effort normal and breath sounds normal. No stridor. No respiratory distress. He has no wheezes. He has no rales. He exhibits no tenderness. Abdominal: Soft. Bowel sounds are normal. He exhibits no shifting dullness, no distension, no pulsatile liver, no fluid wave, no abdominal bruit, no ascites, no pulsatile midline mass and no mass. There is no hepatosplenomegaly, splenomegaly or hepatomegaly. There is tenderness in the epigastric area. There is no rebound, no guarding, no tenderness at McBurney's point and negative Childress's sign. No hernia. Musculoskeletal: He exhibits no edema or deformity. Neurological: No cranial nerve deficit. Coordination normal.  
Skin: Skin is warm and dry. Capillary refill takes less than 2 seconds. No rash noted. No erythema. Psychiatric: He has a normal mood and affect. His behavior is normal.  
Nursing note and vitals reviewed. MDM Number of Diagnoses or Management Options Abdominal pain, epigastric:  
Elevated blood pressure reading:  
  
Amount and/or Complexity of Data Reviewed Clinical lab tests: ordered and reviewed Tests in the radiology section of CPT®: ordered and reviewed Tests in the medicine section of CPT®: reviewed and ordered Review and summarize past medical records: yes Independent visualization of images, tracings, or specimens: yes Patient Progress Patient progress: stable Procedures 12:06 PM 
Discussed with patient at length the need for blood pressure re-evaluation and management with primary care. Discussed the long term sequelae for elevated blood pressure including blindness, CVA, MI, and renal failure/ dialysis. Pt agrees to follow up as directed. NHUNG Armstrong  
 
 
12:11 PM 
Discussed pt, sx, hx and current findings with Mela Tubbs MD. He is in agreement with plan. Will get labs, urine and imaging Mark Doe. SERA Brown 
 
 
2:06 PM  
Pt resting comfortably, updated on ct findings. Will discharge to follow with pcp and gi  
Mark Doe. SERA Brown 
 
LABORATORY TESTS: 
Recent Results (from the past 12 hour(s)) CBC WITH AUTOMATED DIFF Collection Time: 10/18/18 11:42 AM  
Result Value Ref Range WBC 4.4 4.1 - 11.1 K/uL  
 RBC 4.44 4.10 - 5.70 M/uL  
 HGB 13.8 12.1 - 17.0 g/dL HCT 42.1 36.6 - 50.3 % MCV 94.8 80.0 - 99.0 FL  
 MCH 31.1 26.0 - 34.0 PG  
 MCHC 32.8 30.0 - 36.5 g/dL  
 RDW 11.7 11.5 - 14.5 % PLATELET 426 (L) 299 - 400 K/uL MPV 10.4 8.9 - 12.9 FL  
 NRBC 0.0 0  WBC ABSOLUTE NRBC 0.00 0.00 - 0.01 K/uL NEUTROPHILS 58 32 - 75 % LYMPHOCYTES 28 12 - 49 % MONOCYTES 8 5 - 13 % EOSINOPHILS 5 0 - 7 % BASOPHILS 1 0 - 1 % IMMATURE GRANULOCYTES 1 (H) 0.0 - 0.5 % ABS. NEUTROPHILS 2.6 1.8 - 8.0 K/UL  
 ABS. LYMPHOCYTES 1.2 0.8 - 3.5 K/UL  
 ABS. MONOCYTES 0.4 0.0 - 1.0 K/UL  
 ABS. EOSINOPHILS 0.2 0.0 - 0.4 K/UL  
 ABS. BASOPHILS 0.0 0.0 - 0.1 K/UL  
 ABS. IMM. GRANS. 0.0 0.00 - 0.04 K/UL  
 DF AUTOMATED METABOLIC PANEL, COMPREHENSIVE Collection Time: 10/18/18 11:42 AM  
Result Value Ref Range  Sodium 140 136 - 145 mmol/L  
 Potassium 3.9 3.5 - 5.1 mmol/L Chloride 102 97 - 108 mmol/L  
 CO2 30 21 - 32 mmol/L Anion gap 8 5 - 15 mmol/L Glucose 113 (H) 65 - 100 mg/dL BUN 10 6 - 20 MG/DL Creatinine 1.19 0.70 - 1.30 MG/DL  
 BUN/Creatinine ratio 8 (L) 12 - 20 GFR est AA >60 >60 ml/min/1.73m2 GFR est non-AA 60 (L) >60 ml/min/1.73m2 Calcium 9.3 8.5 - 10.1 MG/DL Bilirubin, total 0.7 0.2 - 1.0 MG/DL  
 ALT (SGPT) 39 12 - 78 U/L  
 AST (SGOT) 30 15 - 37 U/L Alk. phosphatase 71 45 - 117 U/L Protein, total 7.6 6.4 - 8.2 g/dL Albumin 3.8 3.5 - 5.0 g/dL Globulin 3.8 2.0 - 4.0 g/dL A-G Ratio 1.0 (L) 1.1 - 2.2 LIPASE Collection Time: 10/18/18 11:42 AM  
Result Value Ref Range Lipase 159 73 - 393 U/L IMAGING RESULTS: 
 
Ct Abd Pelv W Cont Result Date: 10/18/2018 INDICATION: diffuse abd pain COMPARISON: None TECHNIQUE: Following the uneventful intravenous administration of 100 cc Isovue-370, thin axial images were obtained through the abdomen and pelvis. Coronal and sagittal reconstructions were generated. Oral contrast was not administered. CT dose reduction was achieved through use of a standardized protocol tailored for this examination and automatic exposure control for dose modulation. FINDINGS: LUNG BASES: Clear. LIVER: No mass or biliary dilatation. GALLBLADDER: Unremarkable. SPLEEN: No mass. PANCREAS: No mass or ductal dilatation. ADRENALS: There is mild thickening of both adrenal glands without nodule. This is consistent with mild hypoplastic changes. KIDNEYS: There is no hydronephrosis or calculus. There are lucencies in both kidneys most of which are too small to characterize. There is a lucency in the central mid portion of the left kidney which is consistent with cysts. GI: Diverticulosis of the colon without diverticulitis noted. There is no obstruction or inflammatory change. APPENDIX: Unremarkable. PERITONEUM: No ascites or pneumoperitoneum.  RETROPERITONEUM: No lymphadenopathy or aortic aneurysm. URINARY BLADDER: No mass or calculus. PELVIS: There is no adenopathy. Prostate gland is enlarged. BONES: Marked degenerative changes of the lumbar spine noted. ADDITIONAL COMMENTS: N/A IMPRESSION: 1. No acute finding in the abdomen or pelvis. 2. Diverticulosis without diverticulitis of the colon. 3. Prostatic enlargement. MEDICATIONS GIVEN: 
Medications  
iopamidol (ISOVUE-370) 76 % injection (98 mL  Given 10/18/18 1321) IMPRESSION: 
1. Abdominal pain, epigastric 2. Elevated blood pressure reading PLAN: 
1. Discharge Medication List as of 10/18/2018  2:07 PM  
  
START taking these medications Details  
famotidine (PEPCID) 20 mg tablet Take 1 Tab by mouth two (2) times a day., Print, Disp-20 Tab, R-0  
  
  
CONTINUE these medications which have NOT CHANGED Details  
amLODIPine (NORVASC) 5 mg tablet Take 1 Tab by mouth daily. , Normal, Disp-90 Tab, R-1  
  
atorvastatin (LIPITOR) 10 mg tablet Take 10 mg by mouth daily. , Historical Med  
  
busPIRone (BUSPAR) 5 mg tablet Take 1 Tab by mouth two (2) times a day., Normal, Disp-180 Tab, R-1  
  
diclofenac EC (VOLTAREN) 50 mg EC tablet Take 1 Tab by mouth two (2) times daily as needed., Normal, Disp-60 Tab, R-2  
  
aspirin delayed-release 81 mg tablet Take  by mouth daily. , Historical Med  
  
multivitamin, tx-iron-ca-min (THERA-M W/ IRON) 9 mg iron-400 mcg tab tablet Take 1 Tab by mouth daily. , Historical Med  
  
metoprolol succinate (TOPROL-XL) 25 mg XL tablet Take 1 Tab by mouth daily. , Normal, Disp-90 Tab, R-1  
  
lisinopril (PRINIVIL, ZESTRIL) 20 mg tablet Take 1 Tab by mouth nightly for 90 days. , Normal, Disp-90 Tab, R-1  
  
  
 
2. Follow-up Information Follow up With Specialties Details Why Contact Info Su Diaz MD Pediatrics, Family Practice Schedule an appointment as soon as possible for a visit 2-4 days for recheck Angie Car [facility] [phone] [phone] Samara Mckeon MD Gastroenterology Schedule an appointment as soon as possible for a visit 2-4 days for recheck [address] [facility] [address] [place] [address] 
[phone] Return to ED if worse 2:07 PM 
Pt has been reexamined. Pt has no new complaints, changes or physical findings. Care plan outlined and precautions discussed. All available results were reviewed with pt. All medications were reviewed with pt. All of pt's questions and concerns were addressed. Pt agrees to F/U as instructed and agrees to return to ED upon further deterioration. Pt is ready to go home.  
[facility] [place]

## 2018-10-18 NOTE — ED NOTES
Discharged by provider. Leaves ambulatory with discharge paperwork. Denies questions. Pt in no acute distress at time of discharge.

## 2018-10-24 ENCOUNTER — OFFICE VISIT (OUTPATIENT)
Dept: FAMILY MEDICINE CLINIC | Age: 78
End: 2018-10-24

## 2018-10-24 VITALS
BODY MASS INDEX: 26.74 KG/M2 | RESPIRATION RATE: 20 BRPM | TEMPERATURE: 97.6 F | SYSTOLIC BLOOD PRESSURE: 128 MMHG | HEIGHT: 71 IN | OXYGEN SATURATION: 96 % | WEIGHT: 191 LBS | DIASTOLIC BLOOD PRESSURE: 64 MMHG | HEART RATE: 71 BPM

## 2018-10-24 DIAGNOSIS — N40.0 PROSTATE ENLARGEMENT: ICD-10-CM

## 2018-10-24 DIAGNOSIS — Z12.11 SCREENING FOR COLON CANCER: ICD-10-CM

## 2018-10-24 DIAGNOSIS — R00.2 PALPITATIONS: Primary | ICD-10-CM

## 2018-10-24 DIAGNOSIS — R10.9 INTERMITTENT ABDOMINAL PAIN: ICD-10-CM

## 2018-10-24 DIAGNOSIS — K57.30 DIVERTICULOSIS OF LARGE INTESTINE WITHOUT HEMORRHAGE: ICD-10-CM

## 2018-10-24 RX ORDER — UREA 10 %
1 LOTION (ML) TOPICAL
Qty: 30 TAB | Refills: 3 | Status: SHIPPED | OUTPATIENT
Start: 2018-10-24 | End: 2018-11-23

## 2018-10-24 NOTE — PATIENT INSTRUCTIONS
Palpitations: Care Instructions Your Care Instructions Heart palpitations are the uncomfortable sensation that your heart is beating fast or irregularly. You might feel pounding or fluttering in your chest. It might feel like your heart is skipping a beat. Although palpitations may be caused by a heart problem, they also occur because of stress, fatigue, or use of alcohol, caffeine, or nicotine. Many medicines, including diet pills, antihistamines, decongestants, and some herbal products, can cause heart palpitations. Nearly everyone has palpitations from time to time. Depending on your symptoms, your doctor may need to do more tests to try to find the cause of your palpitations. Follow-up care is a key part of your treatment and safety. Be sure to make and go to all appointments, and call your doctor if you are having problems. It's also a good idea to know your test results and keep a list of the medicines you take. How can you care for yourself at home? · Avoid caffeine, nicotine, and excess alcohol. · Do not take illegal drugs, such as methamphetamines and cocaine. · Do not take weight loss or diet medicines unless you talk with your doctor first. 
· Get plenty of sleep. · Do not overeat. · If you have palpitations again, take deep breaths and try to relax. This may slow a racing heart. · If you start to feel lightheaded, lie down to avoid injuries that might result if you pass out and fall down. · Keep a record of your palpitations and bring it to your next doctor's appointment. Write down: ? The date and time. ? Your pulse. (If your heart is beating fast, it may be hard to count your pulse.) ? What you were doing when the palpitations started. ? How long the palpitations lasted. ? Any other symptoms. · If an activity causes palpitations, slow down or stop. Talk to your doctor before you do that activity again. · Take your medicines exactly as prescribed.  Call your doctor if you think you are having a problem with your medicine. When should you call for help? Call 911 anytime you think you may need emergency care. For example, call if: 
  · You passed out (lost consciousness).  
  · You have symptoms of a heart attack. These may include: 
? Chest pain or pressure, or a strange feeling in the chest. 
? Sweating. ? Shortness of breath. ? Pain, pressure, or a strange feeling in the back, neck, jaw, or upper belly or in one or both shoulders or arms. ? Lightheadedness or sudden weakness. ? A fast or irregular heartbeat. After you call 911, the  may tell you to chew 1 adult-strength or 2 to 4 low-dose aspirin. Wait for an ambulance. Do not try to drive yourself.  
  · You have symptoms of a stroke. These may include: 
? Sudden numbness, tingling, weakness, or loss of movement in your face, arm, or leg, especially on only one side of your body. ? Sudden vision changes. ? Sudden trouble speaking. ? Sudden confusion or trouble understanding simple statements. ? Sudden problems with walking or balance. ? A sudden, severe headache that is different from past headaches.  
 Call your doctor now or seek immediate medical care if: 
  · You have heart palpitations and: ? Are dizzy or lightheaded, or you feel like you may faint. ? Have new or increased shortness of breath.  
 Watch closely for changes in your health, and be sure to contact your doctor if: 
  · You continue to have heart palpitations. Where can you learn more? Go to http://prabhjot-willie.info/. Enter R508 in the search box to learn more about \"Palpitations: Care Instructions. \" Current as of: December 6, 2017 Content Version: 11.8 © 0219-9954 [employer]. Care instructions adapted under license by [employer] (which disclaims liability or warranty for this information).  If you have questions about a medical condition or this instruction, always ask your healthcare professional. [address] any warranty or liability for your use of this information. Melatonin (By mouth) Melatonin (marquise-a-TOE-jared) Treats insomnia. Brand Name(s): Advanced Sleep Melatonin, Basic's Melatonin, [employer] Nutrition Melatonin, [employer] Melatonin, Nature's Blend Melatonin, Optimum Melatonin, [employer]ure Melatonin, [employer] Melatonin, [employer] Melatonin There may be other brand names for this medicine. When This Medicine Should Not Be Used: You should not use this medicine if you have had an allergic reaction to melatonin. How to Use This Medicine:  
Capsule, Long Acting Capsule, Liquid, Tablet, Long Acting Tablet · Your doctor will tell you how much medicine to use. Do not use more than directed. · Follow the instructions on the medicine label if you are using this medicine without a prescription. · Take your dose 20 minutes before your bedtime. You may take this medicine with or without food. · The liquid may be taken directly or combined with water or juice. If a dose is missed: · If you miss a dose or forget to use your medicine, call your doctor or pharmacist for instructions. How to Store and Dispose of This Medicine: · Store the medicine in a closed container at room temperature, away from heat, moisture, and direct light. · Keep all medicine out of the reach of children. Never share your medicine with anyone. · Ask your pharmacist, doctor, or health caregiver about the best way to dispose of any outdated medicine or medicine no longer needed. Drugs and Foods to Avoid: Ask your doctor or pharmacist before using any other medicine, including over-the-counter medicines, vitamins, and herbal products. · Make sure your doctor knows if you are also using any tranquilizer medicines, or if you are also using any sedative medicines. Warnings While Using This Medicine: · Make sure your doctor knows if you are pregnant or breast feeding, or if you have an autoimmune condition. Make sure your doctor knows if you are feeling sad or depressed. · This medicine may make you drowsy. Avoid driving, using machines, or doing anything else that might be dangerous if you are not alert. Possible Side Effects While Using This Medicine: If you notice these less serious side effects, talk with your doctor: · Feeling sluggish or tired in the morning. · Headache. If you notice other side effects that you think are caused by this medicine, tell your doctor. Call your doctor for medical advice about side effects. You may report side effects to FDA at 7-854-FDA-2822 © 2017 [facility] Information is for End User's use only and may not be sold, redistributed or otherwise used for commercial purposes. The above information is an  only. It is not intended as medical advice for individual conditions or treatments. Talk to your doctor, nurse or pharmacist before following any medical regimen to see if it is safe and effective for you.

## 2018-10-24 NOTE — PROGRESS NOTES
Chief Complaint: 
Chief Complaint Patient presents with  Palpitations Complains of feeling an irregular heart rate [facility] Follow Up Follow up ER visit at [facility]; dx epigastric pain History of Present Illness: He is a 78 y.o. male who presents with c/o having an irregular heart rate. We did an EKG in the office and this showed a regular rate and rhythm, but he did have one occasional ectopic ventricular beat. This was not sustained. His BP is well managed today. He had the addition of the Amlodipine and this seems to have helped. He is back on Buspar 5 mgs instead of 7.5. The patient has a h/o anxiety and is hypervigilant about his health. He denies SOB, chest pain or any other constitutional issues. He reports that symptoms come and go. He mostly feels it at night when he is laying down. He has not been seen by his cardiologist in a few months, but did have a normal Cardiolite stress test in May and subsequent EKG's are unchanged, except for the Ventricular contraction seen today. He denies drinking coffee. Drinks tea once in the morning. He does not drink soft drinks. Also of note, on his recent CT-scan of the abdomen in the ER, he was told that he had an enlarged prostate. He has never had that checked here (he moved here about 1-year ago). He reports that the doctors in [place] checked it and it was normal, per his report. He also is in need of a colonoscopy due to diverticulosis seen on the CT-scan and abdominal discomfort- which he reports today is better. Reviewed PmHx, RxHx, FmHx, SocHx, AllgHx and updated and dated in the chart. Patient Active Problem List  
 Diagnosis  Anxiety  Tinnitus of left ear  Essential hypertension  Pure hypercholesterolemia Review of Systems - negative except as listed above in the HPI Objective:  
 
Vitals:  
 10/24/18 1050 BP: 128/64 Pulse: 71 Resp: 20 Temp: 97.6 °F (36.4 °C) TempSrc: Oral  
 SpO2: 96% Weight: 191 lb (86.6 kg) Height: 5' 11\" (1.803 m) Physical Examination:  
General appearance - alert, well appearing, and in no distress Mental status - alert, oriented to person, place, and time Eyes - pupils equal and reactive, extraocular eye movements intact Chest - clear to auscultation, no wheezes, rales or rhonchi, symmetric air entry Heart - normal rate, regular rhythm, normal S1, S2, no murmurs, rubs, clicks or gallops. Early ectopic beats heard with ascultation Abdomen - soft, nontender, nondistended, no masses or organomegaly Neurological - alert, oriented, normal speech, no focal findings or movement disorder noted Musculoskeletal - no joint tenderness, deformity or swelling Extremities - peripheral pulses normal, no pedal edema, no clubbing or cyanosis Skin - normal coloration and turgor, no rashes, no suspicious skin lesions noted Assessment/ Plan:  
Diagnoses and all orders for this visit: ICD-10-CM ICD-9-CM 1. Palpitations R00.2 785.1 AMB POC EKG ROUTINE W/ 12 LEADS, INTER & REP  
   REFERRAL TO CARDIOLOGY 2. Prostate enlargement N40.0 600.00 PSA, DIAGNOSTIC (PROSTATE SPECIFIC AG) 3. Diverticulosis of large intestine without hemorrhage K57.30 562.10 REFERRAL TO GASTROENTEROLOGY 4. Intermittent abdominal pain R10.9 789.00 REFERRAL TO GASTROENTEROLOGY 5. Screening for colon cancer Z12.11 V76.51 REFERRAL TO GASTROENTEROLOGY Marcellus Rosario EKG was essentially unchanged from baseline. Will refer back to his cardiologist as I did hear quite a few PVC's with ascultation. The patient also requesting something to help him sleep. In view of the ectopic beats, would like to avoid medications like Trazodone and I do not want to restart Xanax at this time as we successfully weaned him off the medication. I have asked him instead to consider taking melatonin.  He will give this a trial. Also, given the diverticulosis and intermittent abdominal pain (although the patient claims today that he is having NO PAIN), will give a referral to GI as outlined above. In reviewing his record, the CT also showed and enlarged prostate. The patient reports that his previous PCP did not check his PSA routinely. Will check today and may need to send to urology if needed. I have discussed the diagnosis with the patient and the intended treatment plan as seen in the above orders. The patient has received an after-visit summary and questions were answered concerning future plans. Asked to return should symptoms worsen or not improve with treatment. Any pending labs and studies will be relayed to patient when they become available. Pt verbalizes understanding of plan of care and denies further questions or concerns at this time. Follow-up Disposition: 
Return if symptoms worsen or fail to improve.  
 
Kittie Severance, MD

## 2018-10-25 LAB — PSA SERPL-MCNC: 2.7 NG/ML (ref 0–4)

## 2018-10-26 NOTE — PROGRESS NOTES
LOV:4/18/18  Reason For Visit:Palpitations  Referred By:PCP  EKG:NO  Device:  Device Check:  Event Monitor:  Holter Monitor:

## 2018-10-29 NOTE — PROGRESS NOTES
Left voice message advising patient that lab was normal.  Requested call back if he has any questions.

## 2018-10-31 ENCOUNTER — OFFICE VISIT (OUTPATIENT)
Dept: CARDIOLOGY CLINIC | Age: 78
End: 2018-10-31

## 2018-10-31 VITALS
HEART RATE: 70 BPM | SYSTOLIC BLOOD PRESSURE: 130 MMHG | WEIGHT: 189.6 LBS | OXYGEN SATURATION: 96 % | HEIGHT: 71 IN | RESPIRATION RATE: 18 BRPM | DIASTOLIC BLOOD PRESSURE: 66 MMHG | BODY MASS INDEX: 26.54 KG/M2

## 2018-10-31 DIAGNOSIS — E78.00 PURE HYPERCHOLESTEROLEMIA: ICD-10-CM

## 2018-10-31 DIAGNOSIS — I10 ESSENTIAL HYPERTENSION: ICD-10-CM

## 2018-10-31 DIAGNOSIS — R00.2 PALPITATIONS: Primary | ICD-10-CM

## 2018-10-31 DIAGNOSIS — I49.1 PREMATURE ATRIAL CONTRACTIONS: ICD-10-CM

## 2018-10-31 RX ORDER — LISINOPRIL 10 MG/1
10 TABLET ORAL
Qty: 90 TAB | Refills: 1 | Status: SHIPPED | OUTPATIENT
Start: 2018-10-31 | End: 2019-01-29

## 2018-10-31 RX ORDER — METOPROLOL SUCCINATE 50 MG/1
50 TABLET, EXTENDED RELEASE ORAL DAILY
Qty: 30 TAB | Refills: 3 | Status: SHIPPED | OUTPATIENT
Start: 2018-10-31 | End: 2018-11-28

## 2018-10-31 NOTE — PROGRESS NOTES
HISTORY OF PRESENTING ILLNESS      Vivi Samaniego is a 78 y.o. male  with history of hypertension, dyslipidemia and palpitations. Previous EKGs have demonstrated sinus rhythm with PACs and PVCs. He underwent a 24-hour Holter monitor which demonstrated PACs which were nonconducted at times resulting in compensatory pauses. He felt as if he was intolerant of metoprolol d/t fatigue and did a short trial of diltiazem but experienced dizziness. He is back on metoprolol and continues to feel palpitations daily. This is unchanged. He is also concerned that he feels poorly when his blood pressure drops into the 84A systolic. ACTIVE PROBLEM LIST     Patient Active Problem List    Diagnosis Date Noted    Anxiety 09/05/2018    Tinnitus of left ear 01/30/2018    Essential hypertension 01/30/2018    Pure hypercholesterolemia 01/30/2018           PAST MEDICAL HISTORY     Past Medical History:   Diagnosis Date    Diverticulitis     High cholesterol     Hyperlipidemia     Hypertension     Irregular heart beat     Tinnitus            PAST SURGICAL HISTORY     History reviewed. No pertinent surgical history. ALLERGIES     Allergies   Allergen Reactions    Losartan Other (comments)     Shaking, feeling unbalanced.            FAMILY HISTORY     Family History   Problem Relation Age of Onset    Hypertension Mother    [facility] Stroke Father     Diabetes Sister     Hypertension Sister     negative for cardiac disease       SOCIAL HISTORY     Social History     Socioeconomic History    Marital status: SINGLE     Spouse name: Not on file    Number of children: Not on file    Years of education: Not on file    Highest education level: Not on file   Social Needs    Financial resource strain: Not on file    Food insecurity - worry: Not on file    Food insecurity - inability: Not on file   [employer] needs - medical: Not on file   [employer] needs - non-medical: Not on file   Occupational History    Not on file   Tobacco Use    Smoking status: Never Smoker    Smokeless tobacco: Never Used   Substance and Sexual Activity    Alcohol use: Yes     Alcohol/week: 1.8 - 2.4 oz     Types: 3 - 4 Standard drinks or equivalent per week     Comment: socially    Drug use: No    Sexual activity: No   Other Topics Concern    Not on file   Social History Narrative    ** Merged History Encounter **              MEDICATIONS     Current Outpatient Medications   Medication Sig    lisinopril (PRINIVIL, ZESTRIL) 10 mg tablet Take 1 Tab by mouth nightly for 90 days.  metoprolol succinate (TOPROL-XL) 50 mg XL tablet Take 1 Tab by mouth daily.  melatonin 1 mg tablet Take 1 Tab by mouth nightly for 30 days.  famotidine (PEPCID) 20 mg tablet Take 1 Tab by mouth two (2) times a day.  amLODIPine (NORVASC) 5 mg tablet Take 1 Tab by mouth daily.  atorvastatin (LIPITOR) 10 mg tablet Take 10 mg by mouth daily.  busPIRone (BUSPAR) 5 mg tablet Take 1 Tab by mouth two (2) times a day.  diclofenac EC (VOLTAREN) 50 mg EC tablet Take 1 Tab by mouth two (2) times daily as needed.  aspirin delayed-release 81 mg tablet Take  by mouth daily.  multivitamin, tx-iron-ca-min (THERA-M W/ IRON) 9 mg iron-400 mcg tab tablet Take 1 Tab by mouth daily. No current facility-administered medications for this visit. I have reviewed the nurses notes, vitals, problem list, allergy list, medical history, family, social history and medications. REVIEW OF SYMPTOMS      General: +dizziness, Pt denies excessive weight gain or loss. Pt is able to conduct ADL's  HEENT: Denies blurred vision, headaches, hearing loss, epistaxis and difficulty swallowing. Respiratory: Denies cough, congestion, shortness of breath, ALANIS, wheezing or stridor.   Cardiovascular: +palpitations, Denies precordial pain, edema or PND  Gastrointestinal: Denies poor appetite, indigestion, abdominal pain or blood in stool  Genitourinary: Denies hematuria, dysuria, increased urinary frequency  Musculoskeletal: Denies joint pain or swelling from muscles or joints  Neurologic: Denies tremor, paresthesias, headache, or sensory motor disturbance  Psychiatric: Denies confusion, insomnia, depression  Integumentray: Denies rash, itching or ulcers. Hematologic: Denies easy bruising, bleeding       PHYSICAL EXAMINATION      Vitals:    10/31/18 1409   BP: 130/66   Pulse: 70   Resp: 18   SpO2: 96%   Weight: 189 lb 9.6 oz (86 kg)   Height: 5' 11\" (1.803 m)     General: Well developed, in no acute distress. HEENT: No jaundice, oral mucosa moist, no oral ulcers  Neck: Supple, no stiffness, no lymphadenopathy, supple  Heart:  Normal S1/S2 negative S3 or S4. Regular, no murmur, gallop or rub, no jugular venous distention  Respiratory: Clear bilaterally x 4, no wheezing or rales  Abdomen:   Soft, non-tender, bowel sounds are active.   Extremities:  No edema, normal cap refill, no cyanosis. Musculoskeletal: No clubbing, no deformities  Neuro: A&Ox3, speech clear, gait stable, cooperative, no focal neurologic deficits  Skin: Skin color is normal. No rashes or lesions.  Non diaphoretic, moist.  Vascular: 2+ pulses symmetric in all extremities       DIAGNOSTIC DATA      EKG:        LABORATORY DATA      Lab Results   Component Value Date/Time    WBC 4.4 10/18/2018 11:42 AM    Hemoglobin (POC) 15.6 12/03/2017 10:40 AM    HGB 13.8 10/18/2018 11:42 AM    Hematocrit (POC) 46 12/03/2017 10:40 AM    HCT 42.1 10/18/2018 11:42 AM    PLATELET 265 (L) 05/81/4942 11:42 AM    MCV 94.8 10/18/2018 11:42 AM      Lab Results   Component Value Date/Time    Sodium 140 10/18/2018 11:42 AM    Potassium 3.9 10/18/2018 11:42 AM    Chloride 102 10/18/2018 11:42 AM    CO2 30 10/18/2018 11:42 AM    Anion gap 8 10/18/2018 11:42 AM    Glucose 113 (H) 10/18/2018 11:42 AM    BUN 10 10/18/2018 11:42 AM    Creatinine 1.19 10/18/2018 11:42 AM    BUN/Creatinine ratio 8 (L) 10/18/2018 11:42 AM    GFR est AA >60 10/18/2018 11:42 AM    GFR est non-AA 60 (L) 10/18/2018 11:42 AM    Calcium 9.3 10/18/2018 11:42 AM    Bilirubin, total 0.7 10/18/2018 11:42 AM    AST (SGOT) 30 10/18/2018 11:42 AM    Alk. phosphatase 71 10/18/2018 11:42 AM    Protein, total 7.6 10/18/2018 11:42 AM    Albumin 3.8 10/18/2018 11:42 AM    Globulin 3.8 10/18/2018 11:42 AM    A-G Ratio 1.0 (L) 10/18/2018 11:42 AM    ALT (SGPT) 39 10/18/2018 11:42 AM           ASSESSMENT      1. Premature atrial contractions              A. Palpitations              B. Nonconducted at times with compensatory pause  2. Hypertension  3. Dyslipidemia  4. PVCs      PLAN     Will decrease his lisinopril to 10mg daily and increase metoprolol to 50mg daily and monitor clinically for improvement of palpitations. He will record blood pressures and bring to follow up. FOLLOW-UP   1 month    Thank you, Brian Mclaughlin MD for allowing me to participate in the care of this extraordinarily pleasant male. Please do not hesitate to contact me for further questions/concerns.      TANVI Ching [facility].  [address] [facility], [facility], [address]  [phone] / [phone] Fax   [phone] / [phone] Fax

## 2018-10-31 NOTE — PROGRESS NOTES
Chief Complaint   Patient presents with    Irregular Heart Beat     Patient presents today has been experiencing Palpitations on and off.      Visit Vitals  Pulse 70   Resp 18   Ht 5' 11\" (1.803 m)   Wt 189 lb 9.6 oz (86 kg)   SpO2 96%   BMI 26.44 kg/m²

## 2018-11-02 DIAGNOSIS — M25.512 CHRONIC PAIN OF BOTH SHOULDERS: ICD-10-CM

## 2018-11-02 DIAGNOSIS — M25.511 CHRONIC PAIN OF BOTH SHOULDERS: ICD-10-CM

## 2018-11-02 DIAGNOSIS — G89.29 CHRONIC PAIN OF BOTH SHOULDERS: ICD-10-CM

## 2018-11-02 RX ORDER — FAMOTIDINE 20 MG/1
20 TABLET, FILM COATED ORAL 2 TIMES DAILY
Qty: 20 TAB | Refills: 0 | Status: SHIPPED | OUTPATIENT
Start: 2018-11-02 | End: 2018-11-11 | Stop reason: SDUPTHER

## 2018-11-02 RX ORDER — DICLOFENAC SODIUM 50 MG/1
50 TABLET, DELAYED RELEASE ORAL
Qty: 60 TAB | Refills: 2 | Status: SHIPPED | OUTPATIENT
Start: 2018-11-02 | End: 2018-11-26 | Stop reason: SDUPTHER

## 2018-11-02 NOTE — TELEPHONE ENCOUNTER
Last visit was 10/24/2018  Last refill was     Diclofenac 10/24/2018    Famotidine 10/.18/2018    Thank you!

## 2018-11-02 NOTE — TELEPHONE ENCOUNTER
Patient called regarding refill request for pepcid. I advised that we received that request today along w/ diclofenac from [facility].

## 2018-11-06 RX ORDER — ATORVASTATIN CALCIUM 10 MG/1
10 TABLET, FILM COATED ORAL DAILY
Qty: 90 TAB | Refills: 1 | Status: SHIPPED | OUTPATIENT
Start: 2018-11-06 | End: 2019-09-18 | Stop reason: SDUPTHER

## 2018-11-12 RX ORDER — FAMOTIDINE 20 MG/1
TABLET, FILM COATED ORAL
Qty: 20 TAB | Refills: 0 | Status: SHIPPED | OUTPATIENT
Start: 2018-11-12 | End: 2018-11-26

## 2018-11-26 DIAGNOSIS — G89.29 CHRONIC PAIN OF BOTH SHOULDERS: ICD-10-CM

## 2018-11-26 DIAGNOSIS — M25.512 CHRONIC PAIN OF BOTH SHOULDERS: ICD-10-CM

## 2018-11-26 DIAGNOSIS — M25.511 CHRONIC PAIN OF BOTH SHOULDERS: ICD-10-CM

## 2018-11-26 NOTE — TELEPHONE ENCOUNTER
Pt last seen on 10/24/18. Last refill was 11/02/18 for qty#60 with 2 refills. Pharmacy requesting 90 day refills.

## 2018-11-27 RX ORDER — DICLOFENAC SODIUM 50 MG/1
50 TABLET, DELAYED RELEASE ORAL
Qty: 180 TAB | Refills: 0 | Status: SHIPPED | OUTPATIENT
Start: 2018-11-27 | End: 2019-02-19 | Stop reason: SDUPTHER

## 2018-11-28 ENCOUNTER — OFFICE VISIT (OUTPATIENT)
Dept: CARDIOLOGY CLINIC | Age: 78
End: 2018-11-28

## 2018-11-28 VITALS
SYSTOLIC BLOOD PRESSURE: 130 MMHG | DIASTOLIC BLOOD PRESSURE: 60 MMHG | RESPIRATION RATE: 20 BRPM | OXYGEN SATURATION: 97 % | BODY MASS INDEX: 27.16 KG/M2 | WEIGHT: 194 LBS | HEART RATE: 64 BPM | HEIGHT: 71 IN

## 2018-11-28 DIAGNOSIS — I10 ESSENTIAL HYPERTENSION: ICD-10-CM

## 2018-11-28 DIAGNOSIS — I49.1 PREMATURE ATRIAL CONTRACTIONS: ICD-10-CM

## 2018-11-28 DIAGNOSIS — R00.2 PALPITATIONS: Primary | ICD-10-CM

## 2018-11-28 RX ORDER — METOPROLOL SUCCINATE 25 MG/1
25 TABLET, EXTENDED RELEASE ORAL DAILY
Qty: 30 TAB | Refills: 3 | Status: SHIPPED | OUTPATIENT
Start: 2018-11-28 | End: 2019-03-21

## 2018-11-28 NOTE — PROGRESS NOTES
Visit Vitals  /60   Pulse 64   Resp 20   Ht 5' 11\" (1.803 m)   Wt 194 lb (88 kg)   SpO2 97%   BMI 27.06 kg/m²     Pt has questions about how low can his HR get before he should be concerned.   Denies feeling palpitations recently  VO EKG

## 2018-11-28 NOTE — PROGRESS NOTES
HISTORY OF PRESENTING ILLNESS      Dayanna Alva is a 78 y.o. male with history of hypertension, dyslipidemia and palpitations.  Previous EKGs have demonstrated sinus rhythm with PACs and PVCs.  He underwent a 24-hour Holter monitor which demonstrated PACs which were nonconducted at times resulting in compensatory pauses. He felt as if he was intolerant of metoprolol d/t fatigue and did a short trial of diltiazem but experienced dizziness. He is back on metoprolol and continues to feel palpitations daily. This is unchanged. He is also concerned that he feels poorly when his blood pressure drops into the 08F systolic. Last visit, we decreased lisinopril to 10mg and increased metoprolol to 50mg daily. He did not tolerate increased BB therapy d/t fatigue and therefore decreased the dose back to 25mg. He reports palpitations have resolved. Home blood pressure readings are normotensive. ACTIVE PROBLEM LIST     Patient Active Problem List    Diagnosis Date Noted    Anxiety 09/05/2018    Tinnitus of left ear 01/30/2018    Essential hypertension 01/30/2018    Pure hypercholesterolemia 01/30/2018           PAST MEDICAL HISTORY     Past Medical History:   Diagnosis Date    Arrhythmia     Diverticulitis     High cholesterol     Hyperlipidemia     Hypertension     Irregular heart beat     Tinnitus            PAST SURGICAL HISTORY     No past surgical history on file. ALLERGIES     Allergies   Allergen Reactions    Losartan Other (comments)     Shaking, feeling unbalanced.            FAMILY HISTORY     Family History   Problem Relation Age of Onset    Hypertension Mother     Stroke Father     Diabetes Sister     Hypertension Sister     negative for cardiac disease       SOCIAL HISTORY     Social History     Socioeconomic History    Marital status: SINGLE     Spouse name: Not on file    Number of children: Not on file    Years of education: Not on file    Highest education level: Not on file   Tobacco Use    Smoking status: Never Smoker    Smokeless tobacco: Never Used   Substance and Sexual Activity    Alcohol use: Yes     Alcohol/week: 4.2 - 4.8 oz     Types: 3 - 4 Standard drinks or equivalent, 4 Shots of liquor per week     Comment: socially    Drug use: No    Sexual activity: No   Social History Narrative    ** Merged History Encounter **              MEDICATIONS     Current Outpatient Medications   Medication Sig    metoprolol succinate (TOPROL-XL) 25 mg XL tablet Take 1 Tab by mouth daily.  diclofenac EC (VOLTAREN) 50 mg EC tablet Take 1 Tab by mouth two (2) times daily as needed.  atorvastatin (LIPITOR) 10 mg tablet Take 1 Tab by mouth daily.  lisinopril (PRINIVIL, ZESTRIL) 10 mg tablet Take 1 Tab by mouth nightly for 90 days. (Patient taking differently: Take 20 mg by mouth nightly.)    amLODIPine (NORVASC) 5 mg tablet Take 1 Tab by mouth daily.  busPIRone (BUSPAR) 5 mg tablet Take 1 Tab by mouth two (2) times a day.  aspirin delayed-release 81 mg tablet Take  by mouth daily.  multivitamin, tx-iron-ca-min (THERA-M W/ IRON) 9 mg iron-400 mcg tab tablet Take 1 Tab by mouth daily. No current facility-administered medications for this visit. I have reviewed the nurses notes, vitals, problem list, allergy list, medical history, family, social history and medications. REVIEW OF SYMPTOMS      General: Pt denies excessive weight gain or loss. Pt is able to conduct ADL's  HEENT: Denies blurred vision, headaches, hearing loss, epistaxis and difficulty swallowing. Respiratory: Denies cough, congestion, shortness of breath, ALANIS, wheezing or stridor.   Cardiovascular: Denies precordial pain, palpitations, edema or PND  Gastrointestinal: Denies poor appetite, indigestion, abdominal pain or blood in stool  Genitourinary: Denies hematuria, dysuria, increased urinary frequency  Musculoskeletal: Denies joint pain or swelling from muscles or joints  Neurologic: Denies tremor, paresthesias, headache, or sensory motor disturbance  Psychiatric: Denies confusion, insomnia, depression  Integumentray: Denies rash, itching or ulcers. Hematologic: Denies easy bruising, bleeding       PHYSICAL EXAMINATION      Vitals:    11/28/18 1502   BP: 130/60   Pulse: 64   Resp: 20   SpO2: 97%   Weight: 194 lb (88 kg)   Height: 5' 11\" (1.803 m)     General: Well developed, in no acute distress. HEENT: No jaundice, oral mucosa moist, no oral ulcers  Neck: Supple, no stiffness, no lymphadenopathy, supple  Heart:  Normal S1/S2 negative S3 or S4. Regular, no murmur, gallop or rub, no jugular venous distention  Respiratory: Clear bilaterally x 4, no wheezing or rales  Abdomen:   Soft, non-tender, bowel sounds are active.   Extremities:  No edema, normal cap refill, no cyanosis. Musculoskeletal: No clubbing, no deformities  Neuro: A&Ox3, speech clear, gait stable, cooperative, no focal neurologic deficits  Skin: Skin color is normal. No rashes or lesions.  Non diaphoretic, moist.  Vascular: 2+ pulses symmetric in all extremities       DIAGNOSTIC DATA      EKG: sinus rhythm with PAC       LABORATORY DATA      Lab Results   Component Value Date/Time    WBC 4.4 10/18/2018 11:42 AM    Hemoglobin (POC) 15.6 12/03/2017 10:40 AM    HGB 13.8 10/18/2018 11:42 AM    Hematocrit (POC) 46 12/03/2017 10:40 AM    HCT 42.1 10/18/2018 11:42 AM    PLATELET 570 (L) 55/95/4547 11:42 AM    MCV 94.8 10/18/2018 11:42 AM      Lab Results   Component Value Date/Time    Sodium 140 10/18/2018 11:42 AM    Potassium 3.9 10/18/2018 11:42 AM    Chloride 102 10/18/2018 11:42 AM    CO2 30 10/18/2018 11:42 AM    Anion gap 8 10/18/2018 11:42 AM    Glucose 113 (H) 10/18/2018 11:42 AM    BUN 10 10/18/2018 11:42 AM    Creatinine 1.19 10/18/2018 11:42 AM    BUN/Creatinine ratio 8 (L) 10/18/2018 11:42 AM    GFR est AA >60 10/18/2018 11:42 AM    GFR est non-AA 60 (L) 10/18/2018 11:42 AM    Calcium 9.3 10/18/2018 11:42 AM    Bilirubin, total 0.7 10/18/2018 11:42 AM    AST (SGOT) 30 10/18/2018 11:42 AM    Alk. phosphatase 71 10/18/2018 11:42 AM    Protein, total 7.6 10/18/2018 11:42 AM    Albumin 3.8 10/18/2018 11:42 AM    Globulin 3.8 10/18/2018 11:42 AM    A-G Ratio 1.0 (L) 10/18/2018 11:42 AM    ALT (SGPT) 39 10/18/2018 11:42 AM           ASSESSMENT      1. Premature atrial contractions              A. Palpitations              B. Nonconducted at times with compensatory pause  2. Hypertension  3. Dyslipidemia  4. PVCs       PLAN     Continue to monitor for palpitations. Follow up with Dr. Lauren Shepherd or Dr. Quin Contreras as scheduled. FOLLOW-UP   1 year    Thank you, Bernie Schilling MD for allowing me to participate in the care of this extraordinarily pleasant male. Please do not hesitate to contact me for further questions/concerns.         TANVI Humphrey [facility].  [address], [facility], [address] [place] [address], [address].    Stephanie Damon  [phone] / [phone] Fax   [phone] / [phone] Fax

## 2018-12-04 RX ORDER — FAMOTIDINE 20 MG/1
TABLET, FILM COATED ORAL
Qty: 20 TAB | Refills: 0 | Status: CANCELLED | OUTPATIENT
Start: 2018-12-04

## 2018-12-05 NOTE — TELEPHONE ENCOUNTER
Spoke with [facility] pharmacist and advised that according to our records, famotidine was discontinued on 11/26/18.

## 2018-12-12 DIAGNOSIS — F41.9 ANXIETY: ICD-10-CM

## 2018-12-13 RX ORDER — BUSPIRONE HYDROCHLORIDE 5 MG/1
5 TABLET ORAL 2 TIMES DAILY
Qty: 180 TAB | Refills: 1 | Status: SHIPPED | OUTPATIENT
Start: 2018-12-13 | End: 2020-03-01

## 2018-12-17 ENCOUNTER — ANESTHESIA (OUTPATIENT)
Dept: ENDOSCOPY | Age: 78
End: 2018-12-17
Payer: MEDICARE

## 2018-12-17 ENCOUNTER — HOSPITAL ENCOUNTER (OUTPATIENT)
Age: 78
Setting detail: OUTPATIENT SURGERY
Discharge: HOME | End: 2018-12-17
Attending: INTERNAL MEDICINE | Admitting: INTERNAL MEDICINE
Payer: MEDICARE

## 2018-12-17 ENCOUNTER — ANESTHESIA EVENT (OUTPATIENT)
Dept: ENDOSCOPY | Age: 78
End: 2018-12-17
Payer: MEDICARE

## 2018-12-17 VITALS
RESPIRATION RATE: 20 BRPM | TEMPERATURE: 97.7 F | SYSTOLIC BLOOD PRESSURE: 122 MMHG | HEART RATE: 64 BPM | BODY MASS INDEX: 26.6 KG/M2 | OXYGEN SATURATION: 99 % | DIASTOLIC BLOOD PRESSURE: 56 MMHG | WEIGHT: 190 LBS | HEIGHT: 71 IN

## 2018-12-17 DIAGNOSIS — I10 ESSENTIAL HYPERTENSION: ICD-10-CM

## 2018-12-17 PROCEDURE — 74011250636 HC RX REV CODE- 250/636

## 2018-12-17 PROCEDURE — 76060000031 HC ANESTHESIA FIRST 0.5 HR: Performed by: INTERNAL MEDICINE

## 2018-12-17 PROCEDURE — 74011250636 HC RX REV CODE- 250/636: Performed by: INTERNAL MEDICINE

## 2018-12-17 PROCEDURE — 76040000019: Performed by: INTERNAL MEDICINE

## 2018-12-17 RX ORDER — ATROPINE SULFATE 0.1 MG/ML
0.4 INJECTION INTRAVENOUS
Status: DISCONTINUED | OUTPATIENT
Start: 2018-12-17 | End: 2018-12-17 | Stop reason: HOSPADM

## 2018-12-17 RX ORDER — NALOXONE HYDROCHLORIDE 0.4 MG/ML
0.4 INJECTION, SOLUTION INTRAMUSCULAR; INTRAVENOUS; SUBCUTANEOUS
Status: DISCONTINUED | OUTPATIENT
Start: 2018-12-17 | End: 2018-12-17 | Stop reason: HOSPADM

## 2018-12-17 RX ORDER — DEXTROMETHORPHAN/PSEUDOEPHED 2.5-7.5/.8
1.2 DROPS ORAL
Status: DISCONTINUED | OUTPATIENT
Start: 2018-12-17 | End: 2018-12-17 | Stop reason: HOSPADM

## 2018-12-17 RX ORDER — EPINEPHRINE 0.1 MG/ML
1 INJECTION INTRACARDIAC; INTRAVENOUS
Status: DISCONTINUED | OUTPATIENT
Start: 2018-12-17 | End: 2018-12-17 | Stop reason: HOSPADM

## 2018-12-17 RX ORDER — SODIUM CHLORIDE 9 MG/ML
50 INJECTION, SOLUTION INTRAVENOUS CONTINUOUS
Status: DISCONTINUED | OUTPATIENT
Start: 2018-12-17 | End: 2018-12-17 | Stop reason: HOSPADM

## 2018-12-17 RX ORDER — PROPOFOL 10 MG/ML
INJECTION, EMULSION INTRAVENOUS
Status: DISCONTINUED | OUTPATIENT
Start: 2018-12-17 | End: 2018-12-17 | Stop reason: HOSPADM

## 2018-12-17 RX ORDER — MIDAZOLAM HYDROCHLORIDE 1 MG/ML
.25-5 INJECTION, SOLUTION INTRAMUSCULAR; INTRAVENOUS
Status: DISCONTINUED | OUTPATIENT
Start: 2018-12-17 | End: 2018-12-17 | Stop reason: HOSPADM

## 2018-12-17 RX ORDER — SODIUM CHLORIDE 9 MG/ML
INJECTION, SOLUTION INTRAVENOUS
Status: DISCONTINUED | OUTPATIENT
Start: 2018-12-17 | End: 2018-12-17 | Stop reason: HOSPADM

## 2018-12-17 RX ORDER — PROPOFOL 10 MG/ML
INJECTION, EMULSION INTRAVENOUS AS NEEDED
Status: DISCONTINUED | OUTPATIENT
Start: 2018-12-17 | End: 2018-12-17 | Stop reason: HOSPADM

## 2018-12-17 RX ORDER — LISINOPRIL 20 MG/1
TABLET ORAL
Qty: 90 TAB | Refills: 0 | Status: SHIPPED | OUTPATIENT
Start: 2018-12-17 | End: 2019-03-21

## 2018-12-17 RX ORDER — FLUMAZENIL 0.1 MG/ML
0.2 INJECTION INTRAVENOUS
Status: DISCONTINUED | OUTPATIENT
Start: 2018-12-17 | End: 2018-12-17 | Stop reason: HOSPADM

## 2018-12-17 RX ADMIN — PROPOFOL 100 MCG/KG/MIN: 10 INJECTION, EMULSION INTRAVENOUS at 14:45

## 2018-12-17 RX ADMIN — SODIUM CHLORIDE: 9 INJECTION, SOLUTION INTRAVENOUS at 14:15

## 2018-12-17 RX ADMIN — SODIUM CHLORIDE 50 ML/HR: 900 INJECTION, SOLUTION INTRAVENOUS at 14:36

## 2018-12-17 RX ADMIN — PROPOFOL 50 MG: 10 INJECTION, EMULSION INTRAVENOUS at 14:45

## 2018-12-17 NOTE — PROCEDURES
Javid Hurtado M.D.  [phone]           2018                EGD Operative Report  Vimal Chi  :  1940  [facility] Medical Record Number:  [id number]      Indication:  Dysphagia/odynophagia     : Jackelin Rviera MD    Referring Provider:  Darrius Ibrahim MD      Anesthesia/Sedation:  MAC anesthesia    Airway assessment: No airway problems anticipated    Pre-Procedural Exam:      Airway: clear, no airway problems anticipated  Heart: RRR, without gallops or rubs  Lungs: clear bilaterally without wheezes, crackles, or rhonchi  Abdomen: soft, nontender, nondistended, bowel sounds present  Mental Status: awake, alert and oriented to person, place and time       Procedure Details     After infomed consent was obtained for the procedure, with all risks and benefits of procedure explained the patient was taken to the endoscopy suite and placed in the left lateral decubitus position. Following sequential administration of sedation as per above, the endoscope was inserted into the mouth and advanced under direct vision to second portion of the duodenum. A careful inspection was made as the gastroscope was withdrawn, including a retroflexed view of the proximal stomach; findings and interventions are described below. Findings:   Esophagus:Mucosa within normal throughout the esophagus, mild narrowing noted in the proximal to mid esophagus. No mass lesion seen. Stomach: Mucosa within normal.  Duodenum/jejunum: normal    Therapies:  Effective esophageal dilation with savary sizes 54 Fr    Specimens: none           Complications:   None; patient tolerated the procedure well. EBL:  None. Impression:    Benign esophageal stricture in the proximal to mid esophagus, effectively dilated    Recommendations:    - Remain on soft diet  - Chew food very well and eat slowly.     Jackelin Rivera MD

## 2018-12-17 NOTE — PERIOP NOTES
1448  Anesthesia staff at patient's bedside administering anesthesia and monitoring patients vital signs throughout procedure. See anesthesia note. 12  Endoscope was pre-cleaned at bedside immediately following procedure by Winston Valle RN.     1507  Patient tolerated procedure without problems. Abdomen soft and patient arousable and voices no complaints Report received from CRNA, see anesthesia note. Patient transported to endoscopy recovery area. Report given to LUISITO CISNEROS RN.

## 2018-12-17 NOTE — H&P
Maya Daigle M.D.  [phone]            History and Physical       NAME:  Susie Gerber   :   1940   MRN:   [id number]       Referring Physician:  Dr. Gilbert Dickerson Date: 2018 2:35 PM    Chief Complaint:  Colon cancer screening and dysphagia    History of Present Illness:  Patient is a 78 y.o. who is seen for colon cancer screening. He reports intermittent dysphagia. PMH:  Past Medical History:   Diagnosis Date    Arrhythmia     Arthritis     knees    Diverticulitis     High cholesterol     Hyperlipidemia     Hypertension     Irregular heart beat     Tinnitus        PSH:  History reviewed. No pertinent surgical history. Allergies: Allergies   Allergen Reactions    Losartan Other (comments)     Shaking, feeling unbalanced. Home Medications:  Prior to Admission Medications   Prescriptions Last Dose Informant Patient Reported? Taking? amLODIPine (NORVASC) 5 mg tablet 2018 at Unknown time  No Yes   Sig: Take 1 Tab by mouth daily. aspirin delayed-release 81 mg tablet 2018 at Unknown time  Yes Yes   Sig: Take  by mouth daily. atorvastatin (LIPITOR) 10 mg tablet 2018 at Unknown time  No Yes   Sig: Take 1 Tab by mouth daily. busPIRone (BUSPAR) 5 mg tablet 2018 at Unknown time  No Yes   Sig: Take 1 Tab by mouth two (2) times a day. diclofenac EC (VOLTAREN) 50 mg EC tablet 12/10/2018 at Unknown time  No Yes   Sig: Take 1 Tab by mouth two (2) times daily as needed. lisinopril (PRINIVIL, ZESTRIL) 10 mg tablet Not Taking at Unknown time  No No   Sig: Take 1 Tab by mouth nightly for 90 days. Patient taking differently: Take 20 mg by mouth nightly. lisinopril (PRINIVIL, ZESTRIL) 20 mg tablet 2018 at Unknown time  No Yes   Sig: TAKE 1 TABLET BY MOUTH NIGHTLY   metoprolol succinate (TOPROL-XL) 25 mg XL tablet 2018 at Unknown time  No Yes   Sig: Take 1 Tab by mouth daily.    multivitamin, tx-iron-ca-min (THERA-M W/ IRON) 9 mg iron-400 mcg tab tablet 12/16/2018 at Unknown time  Yes Yes   Sig: Take 1 Tab by mouth daily. Facility-Administered Medications: None       Hospital Medications:  No current facility-administered medications for this encounter. Social History:  Social History     Tobacco Use    Smoking status: Never Smoker    Smokeless tobacco: Never Used   Substance Use Topics    Alcohol use: Yes     Alcohol/week: 4.2 - 4.8 oz     Types: 3 - 4 Standard drinks or equivalent, 4 Shots of liquor per week     Comment: socially       Family History:  Family History   Problem Relation Age of Onset    Hypertension Mother     Stroke Father     Diabetes Sister     Hypertension Sister              Review of Systems:      Constitutional: negative fever, negative chills, negative weight loss  Eyes:   negative visual changes  ENT:   negative sore throat, tongue or lip swelling  Respiratory:  negative cough, negative dyspnea  Cards:  negative for chest pain, palpitations, lower extremity edema  GI:   See HPI  :  negative for frequency, dysuria  Integument:  negative for rash and pruritus  Heme:  negative for easy bruising and gum/nose bleeding  Musculoskel: negative for myalgias,  back pain and muscle weakness  Neuro: negative for headaches, dizziness, vertigo  Psych:  negative for feelings of anxiety, depression       Objective:     Patient Vitals for the past 8 hrs:   BP Temp Pulse Resp SpO2 Height Weight   12/17/18 1408 166/66 98 °F (36.7 °C) 69 23 96 % 5' 11\" (1.803 m) 86.2 kg (190 lb)     No intake/output data recorded. No intake/output data recorded. EXAM:     NEURO-a&o   HEENT-wnl   LUNGS-clear    COR-regular rate and rhythym     ABD-soft , no tenderness, no rebound, good bs     EXT-no edema     Data Review     No results for input(s): WBC, HGB, HCT, PLT, HGBEXT, HCTEXT, PLTEXT in the last 72 hours.   No results for input(s): NA, K, CL, CO2, BUN, CREA, GLU, PHOS, CA in the last 72 hours. No results for input(s): SGOT, GPT, AP, TBIL, TP, ALB, GLOB, GGT, AML, LPSE in the last 72 hours. No lab exists for component: AMYP, HLPSE  No results for input(s): INR, PTP, APTT in the last 72 hours. No lab exists for component: INREXT    Patient Active Problem List   Diagnosis Code    Tinnitus of left ear H93.12    Essential hypertension I10    Pure hypercholesterolemia E78.00    Anxiety F41.9      Assessment:   · Colon cancer screening and dysphagia   Plan:   · EGD and Colonoscopy today.      Signed By: Terrell Zendejas MD     12/17/2018  2:35 PM

## 2018-12-17 NOTE — ANESTHESIA PREPROCEDURE EVALUATION
Anesthetic History   No history of anesthetic complications            Review of Systems / Medical History  Patient summary reviewed and pertinent labs reviewed    Pulmonary  Within defined limits                 Neuro/Psych   Within defined limits           Cardiovascular    Hypertension        Dysrhythmias : PVC      Exercise tolerance: >4 METS     GI/Hepatic/Renal  Within defined limits              Endo/Other        Arthritis     Other Findings              Physical Exam    Airway  Mallampati: II  TM Distance: 4 - 6 cm  Neck ROM: normal range of motion   Mouth opening: Normal     Cardiovascular  Regular rate and rhythm,  S1 and S2 normal,  no murmur, click, rub, or gallop  Rhythm: regular  Rate: normal         Dental    Dentition: Upper partial plate     Pulmonary  Breath sounds clear to auscultation               Abdominal  GI exam deferred       Other Findings            Anesthetic Plan    ASA: 2  Anesthesia type: MAC          Induction: Intravenous  Anesthetic plan and risks discussed with: Patient

## 2018-12-17 NOTE — PROGRESS NOTES
Deysi Samaniego  1940  [id number]    Situation:  Verbal report received from: Brown Castillo RN  Procedure: Procedure(s):  COLONOSCOPY  ESOPHAGOGASTRODUODENOSCOPY (EGD)  ESOPHAGEAL DILATION    Background:    Preoperative diagnosis: DYSPHAGIA, SCREENING  Postoperative diagnosis: Diverticulosis, hemorrhoids, dilated stricture. :  Dr. Krystal Ham   Assistant(s): Endoscopy RN-1: Laura Rivera RN; Ayesha Durham RN    Specimens: * No specimens in log *  H. Pylori  no    Assessment:  Intra-procedure medications      Anesthesia gave intra-procedure sedation and medications, see anesthesia flow sheet yes    Intravenous fluids: NS@ KVO     Vital signs stable   yes    Abdominal assessment: round and soft   yes    Recommendation:  Discharge patient per MD order  yes.   Return to floor outpatient  Family or Friend family   Permission to share finding with family or friend no

## 2018-12-17 NOTE — ANESTHESIA POSTPROCEDURE EVALUATION
Procedure(s):  COLONOSCOPY  ESOPHAGOGASTRODUODENOSCOPY (EGD)  ESOPHAGEAL DILATION.     Anesthesia Post Evaluation      Multimodal analgesia: multimodal analgesia not used between 6 hours prior to anesthesia start to PACU discharge  Patient location during evaluation: PACU  Patient participation: complete - patient participated  Level of consciousness: awake  Pain management: adequate  Airway patency: patent  Anesthetic complications: no  Cardiovascular status: acceptable, blood pressure returned to baseline and hemodynamically stable  Respiratory status: acceptable  Hydration status: acceptable        Visit Vitals  /56   Pulse 64   Temp 36.5 °C (97.7 °F)   Resp 20   Ht 5' 11\" (1.803 m)   Wt 86.2 kg (190 lb)   SpO2 99%   BMI 26.50 kg/m²

## 2018-12-17 NOTE — PROCEDURES
Jensen Ley M.D.  [phone]            2018          Colonoscopy Operative Report  Viet Gene  :  1940  [facility] Medical Record Number:  [id number]      Indications:    Screening colonoscopy     :  Rogerio Kraft MD    Referring Provider: Bekah Howell MD    Sedation:  MAC anesthesia    Pre-Procedural Exam:      Airway: clear,  No airway problems anticipated  Heart: RRR, without gallops or rubs  Lungs: clear bilaterally without wheezes, crackles, or rhonchi  Abdomen: soft, nontender, nondistended, bowel sounds present  Mental Status: awake, alert and oriented to person, place and time     Procedure Details:  After informed consent was obtained with all risks and benefits of procedure explained and preoperative exam completed, the patient was taken to the endoscopy suite and placed in the left lateral decubitus position. Upon sequential sedation as per above, a digital rectal exam was performed. The Olympus videocolonoscope  was inserted in the rectum and carefully advanced to the cecum, which was identified by the ileocecal valve and appendiceal orifice. The quality of preparation was good. The colonoscope was slowly withdrawn with careful inspection and evaluation between folds. Retroflexion in the rectum was performed. Findings:   Terminal Ileum: not intubated  Cecum: normal  Ascending Colon: no mucosal lesion appreciated  moderate diverticulosis;  Transverse Colon: no mucosal lesion appreciated  mild diverticulosis; Descending Colon: no mucosal lesion appreciated  mild diverticulosis; Sigmoid: no mucosal lesion appreciated  moderate diverticulosis; Rectum: no mucosal lesion appreciated  Grade 2 internal hemorrhoid(s); Interventions:  none    Specimen Removed:  none    Complications: None. EBL:  None.     Impression:  Pandiverticulosis and large internal hemorrhoids    Recommendations:  -High fiber diet.    -Resume normal medication(s). -No need for screening colonoscopy in 10 years based on his age. Discharge Disposition:  Home in the company of a  when able to ambulate.     Marquez Viveros MD  12/17/2018  3:15 PM

## 2018-12-17 NOTE — DISCHARGE INSTRUCTIONS
[facility]. Felton Richardson M.D.  [phone]                 COLON and EGD DISCHARGE INSTRUCTIONS    2018    Andi Gilliam  :  1940  [facility] Medical Record Number:  [id number]      DISCOMFORT:  Sore throat- throat lozenges or warm salt water gargle  Redness at IV site- apply warm compress to area; if redness or soreness persist- contact your physician  There may be a slight amount of blood passed from the rectum  Gaseous discomfort- walking, belching will help relieve any discomfort  You may not operate a vehicle for 12 hours  You may not engage in an occupation involving machinery or appliances for rest of today  You may not drink alcoholic beverages for at least 12 hours  Avoid making any critical decisions for at least 24 hour  DIET:   High fiber soft diet. - however -  remember your colon is empty and a heavy meal will produce gas. Avoid these foods:  vegetables, fried / greasy foods, carbonated drinks for today     ACTIVITY:  You may  resume your normal daily activities it is recommended that you spend the remainder of the day resting -  avoid any strenuous activity and driving. CALL M.D. ANY SIGN OF:   Increasing pain, nausea, vomiting  Abdominal distension (swelling)  New increased bleeding (oral or rectal)  Fever (chills)  Pain in chest area  Bloody discharge from nose or mouth  Shortness of breath      Follow-up Instructions:   Call Dr. Anthony Roy if any questions at [phone]. Results of procedure / biopsy in 7 to 10 days, we will call you with these results. 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 Otherwise no lesions seen. Your colonoscopy showed diverticulosis and internal hemorrhoids. No lesions or polyps seen.

## 2019-01-31 RX ORDER — FAMOTIDINE 20 MG/1
TABLET, FILM COATED ORAL
Qty: 60 TAB | Refills: 3 | Status: SHIPPED | OUTPATIENT
Start: 2019-01-31 | End: 2019-04-25 | Stop reason: SDUPTHER

## 2019-02-19 DIAGNOSIS — M25.512 CHRONIC PAIN OF BOTH SHOULDERS: ICD-10-CM

## 2019-02-19 DIAGNOSIS — M25.511 CHRONIC PAIN OF BOTH SHOULDERS: ICD-10-CM

## 2019-02-19 DIAGNOSIS — G89.29 CHRONIC PAIN OF BOTH SHOULDERS: ICD-10-CM

## 2019-02-19 RX ORDER — DICLOFENAC SODIUM 50 MG/1
50 TABLET, DELAYED RELEASE ORAL
Qty: 180 TAB | Refills: 0 | Status: SHIPPED | OUTPATIENT
Start: 2019-02-19 | End: 2019-03-21

## 2019-02-27 DIAGNOSIS — I10 ESSENTIAL HYPERTENSION: ICD-10-CM

## 2019-02-27 RX ORDER — METOPROLOL SUCCINATE 50 MG/1
TABLET, EXTENDED RELEASE ORAL
Qty: 30 TAB | Refills: 3 | Status: SHIPPED | OUTPATIENT
Start: 2019-02-27 | End: 2019-08-23 | Stop reason: SDUPTHER

## 2019-03-21 ENCOUNTER — OFFICE VISIT (OUTPATIENT)
Dept: FAMILY MEDICINE CLINIC | Age: 79
End: 2019-03-21

## 2019-03-21 VITALS
HEIGHT: 71 IN | OXYGEN SATURATION: 98 % | DIASTOLIC BLOOD PRESSURE: 78 MMHG | RESPIRATION RATE: 18 BRPM | HEART RATE: 72 BPM | SYSTOLIC BLOOD PRESSURE: 148 MMHG | BODY MASS INDEX: 27.86 KG/M2 | WEIGHT: 199 LBS | TEMPERATURE: 98.2 F

## 2019-03-21 DIAGNOSIS — H93.12 TINNITUS OF LEFT EAR: ICD-10-CM

## 2019-03-21 DIAGNOSIS — I10 ESSENTIAL HYPERTENSION: Primary | ICD-10-CM

## 2019-03-21 RX ORDER — LISINOPRIL 40 MG/1
40 TABLET ORAL DAILY
Qty: 90 TAB | Refills: 1 | Status: SHIPPED | OUTPATIENT
Start: 2019-03-21 | End: 2019-04-08

## 2019-03-21 RX ORDER — ALPRAZOLAM 0.5 MG/1
0.5 TABLET ORAL
Refills: 1 | COMMUNITY
Start: 2019-03-02

## 2019-03-21 RX ORDER — LISINOPRIL 10 MG/1
2 TABLET ORAL
Refills: 0 | COMMUNITY
Start: 2019-02-11 | End: 2019-03-21

## 2019-03-21 NOTE — PROGRESS NOTES
Subjective:  
  
Chayo Petersen is a 70 y.o. male here with complaint of \"I can't control my blood pressure at night\". Reports that his BP is going high around 12 AM - 3 AM. Reports that he awakens with a buzzing sensation in the head. Has been evaluated by Dr. Cj Holman for tinnitus and has been prescribed alprazolam at bedtime which has been beneficial. He notices that this buzzing sensation occurs when his SBP is in the 150s or DBPs in the 90s. He has found that BP has improved since taking alprazolam nightly. Current regimen: - Metoprolol XR 25 mg which he takes in the AM   
- Amlodipine 5 mg, lisinopril 40 mg (20 mg tablet + 2-10 mg tablets) which he takes at  9 PM  
 
Current Outpatient Medications Medication Sig Dispense Refill  lisinopril (PRINIVIL, ZESTRIL) 10 mg tablet Take 2 Tabs by mouth nightly. Taking with the other 20 mgs to total 40mg nightly  0  
 ALPRAZolam (XANAX) 0.5 mg tablet TAKE 1 TABLET BY MOUTH AT BEDTIME  1  
 metoprolol succinate (TOPROL-XL) 50 mg XL tablet TAKE 1 TABLET BY MOUTH EVERY DAY 30 Tab 3  
 famotidine (PEPCID) 20 mg tablet TAKE 1 TABLET BY MOUTH TWICE A DAY 60 Tab 3  
 lisinopril (PRINIVIL, ZESTRIL) 20 mg tablet TAKE 1 TABLET BY MOUTH NIGHTLY 90 Tab 0  
 busPIRone (BUSPAR) 5 mg tablet Take 1 Tab by mouth two (2) times a day. 180 Tab 1  
 atorvastatin (LIPITOR) 10 mg tablet Take 1 Tab by mouth daily. 90 Tab 1  
 amLODIPine (NORVASC) 5 mg tablet Take 1 Tab by mouth daily. 90 Tab 1  
 multivitamin, tx-iron-ca-min (THERA-M W/ IRON) 9 mg iron-400 mcg tab tablet Take 1 Tab by mouth daily. Allergies Allergen Reactions  Losartan Other (comments) Shaking, feeling unbalanced. Past Medical History:  
Diagnosis Date  Arrhythmia  Arthritis   
 knees  Diverticulitis  High cholesterol  Hyperlipidemia  Hypertension  Irregular heart beat  Tinnitus Social History Tobacco Use  Smoking status: Never Smoker  Smokeless tobacco: Never Used Substance Use Topics  Alcohol use: Yes Alcohol/week: 4.2 - 4.8 oz Types: 3 - 4 Standard drinks or equivalent, 4 Shots of liquor per week Comment: socially Review of Systems Pertinent items are noted in HPI. Objective:  
 
Visit Vitals /78 (BP 1 Location: Right arm, BP Patient Position: Sitting) Comment: Manual  
Pulse 72 Temp 98.2 °F (36.8 °C) (Oral) Comment: . Resp 18 Ht 5' 11\" (1.803 m) Wt 199 lb (90.3 kg) SpO2 98% BMI 27.75 kg/m² General appearance - alert, well appearing, and in no distress Eyes - pupils equal and reactive, extraocular eye movements intact, sclera anicteric Chest - clear to auscultation, no wheezes, rales or rhonchi, symmetric air entry, no tachypnea, retractions or cyanosis Heart - normal rate, regular rhythm, normal S1, S2, no murmurs, rubs, clicks or gallops Assessment/Plan:  
Sonido Hernandez is a 70 y.o. male seen for:  
 
1. Essential hypertension: new Rx for lisinopril 40 mg provided for ease. No other changes to antihypertensive therapy made. BP improves with use of alprazolam for tinnitus and encouraged to continue with this regimen. Reassurance provided. - lisinopril (PRINIVIL, ZESTRIL) 40 mg tablet; Take 1 Tab by mouth daily. Indications: high blood pressure  Dispense: 90 Tab; Refill: 1 2. Tinnitus of left ear: improvement noted with alprazolam. Continue with current therapy. I have discussed the diagnosis with the patient and the intended plan as seen in the above orders. The patient has received an after-visit summary and questions were answered concerning future plans. I have discussed medication side effects and warnings with the patient as well. Patient verbalizes understanding of plan of care and denies further questions or concerns at this time. Informed patient to return to the office if symptoms worsen or if new symptoms arise. Follow-up and Dispositions · Return in about 4 months (around 7/21/2019) for sooner as needed.

## 2019-03-21 NOTE — PROGRESS NOTES
Identified pt with two pt identifiers(name and ). Chief Complaint Patient presents with  Medication Evaluation  
  patient is wondering if Dr. Sapna Thompson help control his BP better. Is high at night. sometimes 157/90 or 145/82  Hypertension  Ringing in Ear Has been to ENT would like to know if anything else can be done. Health Maintenance Due Topic  DTaP/Tdap/Td series (1 - Tdap)  Shingrix Vaccine Age 50> (1 of 2)  GLAUCOMA SCREENING Q2Y  MEDICARE YEARLY EXAM   
 
 
Wt Readings from Last 3 Encounters:  
19 199 lb (90.3 kg) 18 190 lb (86.2 kg) 18 194 lb (88 kg) Temp Readings from Last 3 Encounters:  
19 98.2 °F (36.8 °C) (Oral) 18 97.7 °F (36.5 °C)  
10/24/18 97.6 °F (36.4 °C) (Oral) BP Readings from Last 3 Encounters:  
19 148/78  
18 122/56  
18 130/60 Pulse Readings from Last 3 Encounters:  
19 72  
18 64  
18 64 Learning Assessment: 
:  
 
Learning Assessment 2018 PRIMARY LEARNER Patient BARRIERS PRIMARY LEARNER VISUAL  
CO-LEARNER CAREGIVER No  
PRIMARY LANGUAGE ENGLISH  
LEARNER PREFERENCE PRIMARY DEMONSTRATION  
  LISTENING  
  READING  
ANSWERED BY patient RELATIONSHIP SELF Depression Screening: 
:  
 
3 most recent PHQ Screens 3/21/2019 Little interest or pleasure in doing things Not at all Feeling down, depressed, irritable, or hopeless Not at all Total Score PHQ 2 0 Fall Risk Assessment: 
:  
 
Fall Risk Assessment, last 12 mths 10/24/2018 Able to walk? Yes Fall in past 12 months? No  
 
 
Abuse Screening: 
:  
 
Abuse Screening Questionnaire 3/21/2019 2018 Do you ever feel afraid of your partner? Zach Dupree Are you in a relationship with someone who physically or mentally threatens you? Zach Dupree Is it safe for you to go home? Jenny Rodriguez Coordination of Care Questionnaire: 
:  
 
 1) Have you been to an emergency room, urgent care clinic since your last visit? no  
Hospitalized since your last visit? no          
 
2) Have you seen or consulted any other health care providers outside of [address] [place] [address] since your last visit? no  (Include any pap smears or colon screenings in this section.) 3) Do you have an Advance Directive on file? no 
Are you interested in receiving information about Advance Directives? no 
 
Reviewed record in preparation for visit and have obtained necessary documentation. Medication reconciliation up to date and corrected with patient at this time.

## 2019-03-21 NOTE — PATIENT INSTRUCTIONS
A Healthy Lifestyle: Care Instructions Your Care Instructions A healthy lifestyle can help you feel good, stay at a healthy weight, and have plenty of energy for both work and play. A healthy lifestyle is something you can share with your whole family. A healthy lifestyle also can lower your risk for serious health problems, such as high blood pressure, heart disease, and diabetes. You can follow a few steps listed below to improve your health and the health of your family. Follow-up care is a key part of your treatment and safety. Be sure to make and go to all appointments, and call your doctor if you are having problems. It's also a good idea to know your test results and keep a list of the medicines you take. How can you care for yourself at home? · Do not eat too much sugar, fat, or fast foods. You can still have dessert and treats now and then. The goal is moderation. · Start small to improve your eating habits. Pay attention to portion sizes, drink less juice and soda pop, and eat more fruits and vegetables. ? Eat a healthy amount of food. A 3-ounce serving of meat, for example, is about the size of a deck of cards. Fill the rest of your plate with vegetables and whole grains. ? Limit the amount of soda and sports drinks you have every day. Drink more water when you are thirsty. ? Eat at least 5 servings of fruits and vegetables every day. It may seem like a lot, but it is not hard to reach this goal. A serving or helping is 1 piece of fruit, 1 cup of vegetables, or 2 cups of leafy, raw vegetables. Have an apple or some carrot sticks as an afternoon snack instead of a candy bar. Try to have fruits and/or vegetables at every meal. 
· Make exercise part of your daily routine. You may want to start with simple activities, such as walking, bicycling, or slow swimming. Try to be active 30 to 60 minutes every day.  You do not need to do all 30 to 60 minutes all at once. For example, you can exercise 3 times a day for 10 or 20 minutes. Moderate exercise is safe for most people, but it is always a good idea to talk to your doctor before starting an exercise program. 
· Keep moving. Inell Luan the lawn, work in the garden, or [employer]. Take the stairs instead of the elevator at work. · If you smoke, quit. People who smoke have an increased risk for heart attack, stroke, cancer, and other lung illnesses. Quitting is hard, but there are ways to boost your chance of quitting tobacco for good. ? Use nicotine gum, patches, or lozenges. ? Ask your doctor about stop-smoking programs and medicines. ? Keep trying. In addition to reducing your risk of diseases in the future, you will notice some benefits soon after you stop using tobacco. If you have shortness of breath or asthma symptoms, they will likely get better within a few weeks after you quit. · Limit how much alcohol you drink. Moderate amounts of alcohol (up to 2 drinks a day for men, 1 drink a day for women) are okay. But drinking too much can lead to liver problems, high blood pressure, and other health problems. Family health If you have a family, there are many things you can do together to improve your health. · Eat meals together as a family as often as possible. · Eat healthy foods. This includes fruits, vegetables, lean meats and dairy, and whole grains. · Include your family in your fitness plan. Most people think of activities such as jogging or tennis as the way to fitness, but there are many ways you and your family can be more active. Anything that makes you breathe hard and gets your heart pumping is exercise. Here are some tips: 
? Walk to do errands or to take your child to school or the bus. 
? Go for a family bike ride after dinner instead of watching TV. Where can you learn more? Go to http://prabhjot-willie.info/. Enter Z022 in the search box to learn more about \"A Healthy Lifestyle: Care Instructions. \" Current as of: September 11, 2018 Content Version: 11.9 © 5191-5678 [employer]. Care instructions adapted under license by [employer] (which disclaims liability or warranty for this information). If you have questions about a medical condition or this instruction, always ask your healthcare professional. [place] [address] any warranty or liability for your use of this information.

## 2019-03-24 DIAGNOSIS — I10 ESSENTIAL HYPERTENSION: ICD-10-CM

## 2019-03-25 RX ORDER — LISINOPRIL 20 MG/1
TABLET ORAL
Qty: 90 TAB | Refills: 0 | Status: SHIPPED | OUTPATIENT
Start: 2019-03-25 | End: 2019-05-23 | Stop reason: SDUPTHER

## 2019-04-08 ENCOUNTER — OFFICE VISIT (OUTPATIENT)
Dept: FAMILY MEDICINE CLINIC | Age: 79
End: 2019-04-08

## 2019-04-08 VITALS
HEIGHT: 71 IN | TEMPERATURE: 98.2 F | WEIGHT: 192.4 LBS | DIASTOLIC BLOOD PRESSURE: 67 MMHG | SYSTOLIC BLOOD PRESSURE: 134 MMHG | OXYGEN SATURATION: 97 % | RESPIRATION RATE: 18 BRPM | HEART RATE: 65 BPM | BODY MASS INDEX: 26.94 KG/M2

## 2019-04-08 DIAGNOSIS — F41.8 ANXIETY ABOUT HEALTH: ICD-10-CM

## 2019-04-08 DIAGNOSIS — R41.89 DIFFICULTY PROCESSING INFORMATION: ICD-10-CM

## 2019-04-08 DIAGNOSIS — I10 ESSENTIAL HYPERTENSION: Primary | ICD-10-CM

## 2019-04-08 RX ORDER — AMLODIPINE BESYLATE 5 MG/1
5 TABLET ORAL DAILY
Qty: 90 TAB | Refills: 1 | Status: SHIPPED | OUTPATIENT
Start: 2019-04-08 | End: 2019-08-23 | Stop reason: SDUPTHER

## 2019-04-08 NOTE — PROGRESS NOTES
Princess Montenegro is a 70 y.o. male Patient would like refill on Norvasc. Patient would like to discuss bp medications Patient states that his bp increases at night due to his tintinus pain that interferes with his sleep Chief Complaint Patient presents with  Hypertension  Medication Evaluation  
  discuss bp medication  Medication Refill Norvasc 1. Have you been to the ER, urgent care clinic since your last visit? Hospitalized since your last visit? Ringing in ear left, 3/27/19 MANPREET TRUJILLO 
2. Have you seen or consulted any other health care providers outside of the [address] [place] [address] since your last visit? Include any pap smears or colon screening. No 
 
 
Visit Vitals /67 (BP 1 Location: Right arm, BP Patient Position: Sitting) Pulse 65 Temp 98.2 °F (36.8 °C) (Oral) Resp 18 Ht 5' 11\" (1.803 m) Wt 192 lb 6.4 oz (87.3 kg) SpO2 97% BMI 26.83 kg/m² Health Maintenance Due Topic Date Due  
 DTaP/Tdap/Td series (1 - Tdap) 12/24/1963  Shingrix Vaccine Age 50> (1 of 2) 12/24/1992  GLAUCOMA SCREENING Q2Y  12/24/2007  MEDICARE YEARLY EXAM  10/18/2018 Medication Reconciliation completed, changes noted.   Please  Update medication list.

## 2019-04-08 NOTE — PATIENT INSTRUCTIONS
Blood Pressure Medication: 1. Please go back to Lisinopril 20 mgs at night. DO NOT TAKE 40 mgs and 20 mgs. We will try to get you back to 10 mgs. 2. Please STOP taking your blood pressure when you awaken with anxiety. It will ALWAYS be elevated. 3. Please ONLY take your blood pressure in the mid-morning.

## 2019-04-08 NOTE — PROGRESS NOTES
Chief Complaint: 
Chief Complaint Patient presents with  Hypertension  Medication Evaluation  
  discuss bp medication  Medication Refill Norvasc History of Present Illness: 
76M with h/o HTN, Tinnitus and anxiety. He comes in once again for ongoing issues with BP control. He is sensitive or insensitive to many medications. I have reviewed his chart completely. He was seen at [address] [place] [address] on 3/27/2019. This was because the buzzing sensation became very loud and he thought he might be having a stroke. 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 This was also seen in a CT-scan done in 12/2017 with his old PCP. So these findings are not new. First, he was seen in October 2018 by NP for cardiology. He was at that time switched to Metorpolol 50 mgs and his Lisinopril was decreased to 10 mgs. He was then seen again in November 2018 and reported poor s/e with the BB. So, this was decreased back to 25 mgs and his Lisinopril was kept at 10 mgs. In March 2019, he was seen by another provider in the office. He c/o worsening tinnitus and also that his BP were elevated from 12-morning. He often awakens with anxiety and then checks his BP and of course it is elevated. He was then seen in the ER at [address] [place] [address] on 3/27/2019 with one of these episodes and generally the work up was normal.  
 
When the patient awakens from sleep in this anxious state, he is checking his Bp and it is elevated. The ENT doctor did prescribe him Xanax 0.5 mgs at bedtime. Lastly, he bring in his medications. He has Lisinopril 40 mgs and Lisinopril 20 mgs and reported that he was told to take the extra 20 mgs with the 40 mgs. But in reviewing the record that is not what was prescribed. He was told to double up on the Lisinopril 20 mgs. It am not sure how he went from Lisinopril 10 mgs to 40 mgs within a short period of time.    
 
Mr. Angy More is also very highly anxious and has multiple ER visits at multiple ER's. Most recently, seen at [address] [place] [address] with near benign work up. Reviewed PmHx, RxHx, FmHx, SocHx, AllgHx and updated and dated in the chart. Patient Active Problem List  
 Diagnosis  Anxiety  Tinnitus of left ear  Essential hypertension  Pure hypercholesterolemia Review of Systems - negative except as listed above in the HPI Objective:  
 
Vitals:  
 04/08/19 1321 BP: 134/67 Pulse: 65 Resp: 18 Temp: 98.2 °F (36.8 °C) TempSrc: Oral  
SpO2: 97% Weight: 192 lb 6.4 oz (87.3 kg) Height: 5' 11\" (1.803 m) Physical Examination:  
General appearance - alert, well appearing, and in no distress Chest - clear to auscultation, no wheezes, rales or rhonchi, symmetric air entry Heart - normal rate, regular rhythm, normal S1, S2, no murmurs, rubs, clicks or gallops Musculoskeletal - no joint tenderness, deformity or swelling Extremities - peripheral pulses normal, no pedal edema, no clubbing or cyanosis Skin - normal coloration and turgor, no rashes, no suspicious skin lesions noted Assessment/ Plan:  
 
78M who presents for continued concerns about Tinnitus (chronic), hypertension in the middle of the night and anxiety. Mr. Jim Patel had a hard time detailing his medications and when to take them. I am concerned that he may have some processing difficulties and overall anxiety. He has seen ENT 3 x for this and is in and out of ER for the tinnitus and anxiety that he is having a stroke. I note that he has evidence of an old Thalamic lacunar stroke. I note that this was seen in 12/2017. We discussed sending him to see neurology/neuropsychology as well for comprehensive evaluation. He will return in 2-weeks for recheck of his BP as we made changes as outlined below. Diagnoses and all orders for this visit: 1. Essential hypertension 
-     amLODIPine (NORVASC) 5 mg tablet; Take 1 Tab by mouth daily. 2. Difficulty processing information 3. Anxiety about health I have discussed the diagnosis with the patient and the intended treatment plan as seen in the above orders. The patient has received an after-visit summary and questions were answered concerning future plans. Asked to return should symptoms worsen or not improve with treatment. Any pending labs and studies will be relayed to patient when they become available. Pt verbalizes understanding of plan of care and denies further questions or concerns at this time. Follow-up and Dispositions · Return in about 2 weeks (around 4/22/2019), or if symptoms worsen or fail to improve, for Recheck blood pressure. Sho Mathis MD 
 
Patient Instructions Blood Pressure Medication: 1. Please go back to Lisinopril 20 mgs at night. DO NOT TAKE 40 mgs and 20 mgs. We will try to get you back to 10 mgs. 2. Please STOP taking your blood pressure when you awaken with anxiety. It will ALWAYS be elevated. 3. Please ONLY take your blood pressure in the mid-morning.

## 2019-04-22 ENCOUNTER — OFFICE VISIT (OUTPATIENT)
Dept: FAMILY MEDICINE CLINIC | Age: 79
End: 2019-04-22

## 2019-04-22 VITALS
BODY MASS INDEX: 27.3 KG/M2 | OXYGEN SATURATION: 95 % | HEIGHT: 71 IN | WEIGHT: 195 LBS | HEART RATE: 75 BPM | TEMPERATURE: 97.9 F | RESPIRATION RATE: 18 BRPM | SYSTOLIC BLOOD PRESSURE: 140 MMHG | DIASTOLIC BLOOD PRESSURE: 80 MMHG

## 2019-04-22 DIAGNOSIS — I10 ESSENTIAL HYPERTENSION: Primary | ICD-10-CM

## 2019-04-22 NOTE — PROGRESS NOTES
Edel Bond is a 70 y.o. male Chief Complaint Patient presents with  Hypertension  
  follow up  Anxiety 1. Have you been to the ER, urgent care clinic since your last visit? Hospitalized since your last visit? No 
M 
2. Have you seen or consulted any other health care providers outside of the [address] [place] [address] since your last visit? Include any pap smears or colon screening. No 
 
 
Visit Vitals /80 (BP 1 Location: Left arm, BP Patient Position: Sitting) Pulse 75 Temp 97.9 °F (36.6 °C) (Oral) Resp 18 Ht 5' 11\" (1.803 m) Wt 195 lb (88.5 kg) SpO2 95% BMI 27.20 kg/m² Health Maintenance Due Topic Date Due  
 DTaP/Tdap/Td series (1 - Tdap) 12/24/1963  Shingrix Vaccine Age 50> (1 of 2) 12/24/1992  GLAUCOMA SCREENING Q2Y  12/24/2007  MEDICARE YEARLY EXAM  10/18/2018 Medication Reconciliation completed, changes noted.   Please  Update medication list.

## 2019-04-22 NOTE — PROGRESS NOTES
Chief Complaint: 
Chief Complaint Patient presents with  Hypertension  
  follow up  Anxiety History of Present Illness: He is a 70 y.o. male who presents for follow up of hypertension. He was seen about 2-weeks ago. At that time, we straightened out his BP medications. He is on Lisinopril 20 mgs and Amlodipine 5 mgs. Since this regimen, he reports that his BP has been better. The tinnitus in his ears is decreased and he is actually sleeping better. The only concern now is allergy symptoms with the heavy pollen burden we have had. Mr. Angela Gunn is calm today and definitely looks like he is feeling better. Reviewed PmHx, RxHx, FmHx, SocHx, AllgHx and updated and dated in the chart. Patient Active Problem List  
 Diagnosis  Anxiety  Tinnitus of left ear  Essential hypertension  Pure hypercholesterolemia Review of Systems - negative except as listed above in the HPI Objective:  
 
Vitals:  
 04/22/19 1358 04/22/19 1408 BP: 140/82 140/80 Pulse: 75 Resp: 18 Temp: 97.9 °F (36.6 °C) TempSrc: Oral   
SpO2: 95% Weight: 195 lb (88.5 kg) Height: 5' 11\" (1.803 m) Physical Examination:  
General appearance - alert, well appearing, and in no distress Mental status - alert, oriented to person, place, and time Chest - clear to auscultation, no wheezes, rales or rhonchi, symmetric air entry Heart - normal rate, regular rhythm, normal S1, S2, no murmurs, rubs, clicks or gallops Musculoskeletal - no joint tenderness, deformity or swelling Extremities - peripheral pulses normal, no pedal edema, no clubbing or cyanosis Skin - normal coloration and turgor, no rashes, no suspicious skin lesions noted Assessment/ Plan:  
Diagnoses and all orders for this visit: 1. Essential hypertension Stable. Improved. Continue with current regimen of Lisinopril 20 mgs and Amlodipine 5 mgs. Seems to be working for him.   
 
Allergy Symptoms - OTC 
 -     dextromethorphan-guaiFENesin (CORICIDIN HBP)  mg cap; Take 1 Cap by mouth daily for 30 days. I have discussed the diagnosis with the patient and the intended treatment plan as seen in the above orders. The patient has received an after-visit summary and questions were answered concerning future plans. Asked to return should symptoms worsen or not improve with treatment. Any pending labs and studies will be relayed to patient when they become available. Pt verbalizes understanding of plan of care and denies further questions or concerns at this time. Follow-up and Dispositions · Return in about 1 month (around 5/22/2019), or if symptoms worsen or fail to improve.  
  
 
 
Gabby Jenkins MD

## 2019-04-25 RX ORDER — FAMOTIDINE 20 MG/1
TABLET, FILM COATED ORAL
Qty: 60 TAB | Refills: 2 | Status: SHIPPED | OUTPATIENT
Start: 2019-04-25 | End: 2020-04-30 | Stop reason: SDUPTHER

## 2019-05-23 DIAGNOSIS — I10 ESSENTIAL HYPERTENSION: ICD-10-CM

## 2019-05-23 RX ORDER — LISINOPRIL 20 MG/1
TABLET ORAL
Qty: 90 TAB | Refills: 0 | Status: SHIPPED | OUTPATIENT
Start: 2019-05-23 | End: 2019-08-22 | Stop reason: SDUPTHER

## 2019-05-30 ENCOUNTER — OFFICE VISIT (OUTPATIENT)
Dept: FAMILY MEDICINE CLINIC | Age: 79
End: 2019-05-30

## 2019-05-30 VITALS
SYSTOLIC BLOOD PRESSURE: 138 MMHG | RESPIRATION RATE: 20 BRPM | DIASTOLIC BLOOD PRESSURE: 66 MMHG | OXYGEN SATURATION: 97 % | HEART RATE: 60 BPM | TEMPERATURE: 97.6 F | WEIGHT: 191.8 LBS | HEIGHT: 71 IN | BODY MASS INDEX: 26.85 KG/M2

## 2019-05-30 DIAGNOSIS — M25.471 RIGHT ANKLE SWELLING: Primary | ICD-10-CM

## 2019-05-30 NOTE — PROGRESS NOTES
Patient presents for complaints of swelling in left foot. Noted bilateral ankle swelling. Patient denies pain, just tenderness in top outer lateral left foot. No insect bite noted.

## 2019-05-30 NOTE — PROGRESS NOTES
Chief Complaint:  Chief Complaint   Patient presents with    Foot Swelling     Left foot swelling. Denies pain. Tenderness to top outer lateral.       History of Present Illness:  He is a 70 y.o. male who presents with c/o swelling of the left foot. Denies pain or injury. No redness or evidence of infection. He noticed it after he was outside cutting grass. Does not typically keep feet elevated. He is on a calcium channel blocker - Amlodipine 5 mgs daily. He just wanted to check out the foot. At this time. Reviewed PmHx, RxHx, FmHx, SocHx, AllgHx and updated and dated in the chart. Patient Active Problem List    Diagnosis    Anxiety    Tinnitus of left ear    Essential hypertension    Pure hypercholesterolemia       Review of Systems - negative except as listed above in the HPI    Objective:     Vitals:    05/30/19 1155   BP: 138/66   Pulse: 60   Resp: 20   Temp: 97.6 °F (36.4 °C)   TempSrc: Oral   SpO2: 97%   Weight: 191 lb 12.8 oz (87 kg)   Height: 5' 11\" (1.803 m)     Physical Examination:   General appearance - alert, well appearing, and in no distress  Mental status - alert, oriented to person, place, and time  Chest - clear to auscultation, no wheezes, rales or rhonchi, symmetric air entry  Heart - normal rate, regular rhythm, normal S1, S2, no murmurs, rubs, clicks or gallops  Musculoskeletal - no joint tenderness, deformity or swelling  Extremities - peripheral pulses normal, 1+non pittling pedal edema L>>R, no clubbing or cyanosis  Skin - normal coloration and turgor, no rashes, no suspicious skin lesions noted    Assessment/ Plan:   78M with mild swelling of the L and R foot. We have just recently been able to get his BP under control. I suggested the following:    Diagnoses and all orders for this visit:    1. Right ankle swelling    Patient Instructions   Swelling in ankles - mild    1. More than likely, the Amlodipine is contributing to the swelling. However, it is very mild at this time. We would like to not stop the medication because for the first time in a long time your BP is excellent. 2. Keep your feet elevated when sitting. 3. Minimize salt intake. No more than 2000 mgs in 24 hours. 4. Compression stockings during the day and when you are out. 5. Monitor for any SOB or symptoms of problems with your breathing. 6. If the swelling becomes painful or unbearable, then we may need to consider changing medications. I have discussed the diagnosis with the patient and the intended treatment plan as seen in the above orders. The patient has received an after-visit summary and questions were answered concerning future plans. Asked to return should symptoms worsen or not improve with treatment. Any pending labs and studies will be relayed to patient when they become available. Pt verbalizes understanding of plan of care and denies further questions or concerns at this time. Follow-up and Dispositions    · Return if symptoms worsen or fail to improve. Pk Perez MD  Patient Instructions   Swelling in ankles - mild    1. More than likely, the Amlodipine is contributing to the swelling. However, it is very mild at this time. We would like to not stop the medication because for the first time in a long time your BP is excellent. 2. Keep your feet elevated when sitting. 3. Minimize salt intake. No more than 2000 mgs in 24 hours. 4. Compression stockings during the day and when you are out. 5. Monitor for any SOB or symptoms of problems with your breathing. 6. If the swelling becomes painful or unbearable, then we may need to consider changing medications.

## 2019-05-30 NOTE — PATIENT INSTRUCTIONS
Swelling in ankles - mild    1. More than likely, the Amlodipine is contributing to the swelling. However, it is very mild at this time. We would like to not stop the medication because for the first time in a long time your BP is excellent. 2. Keep your feet elevated when sitting. 3. Minimize salt intake. No more than 2000 mgs in 24 hours. 4. Compression stockings during the day and when you are out. 5. Monitor for any SOB or symptoms of problems with your breathing. 6. If the swelling becomes painful or unbearable, then we may need to consider changing medications.

## 2019-06-13 ENCOUNTER — OFFICE VISIT (OUTPATIENT)
Dept: FAMILY MEDICINE CLINIC | Age: 79
End: 2019-06-13

## 2019-06-13 ENCOUNTER — TELEPHONE (OUTPATIENT)
Dept: FAMILY MEDICINE CLINIC | Age: 79
End: 2019-06-13

## 2019-06-13 VITALS
HEART RATE: 58 BPM | HEIGHT: 71 IN | BODY MASS INDEX: 26.8 KG/M2 | DIASTOLIC BLOOD PRESSURE: 60 MMHG | TEMPERATURE: 97.5 F | OXYGEN SATURATION: 97 % | SYSTOLIC BLOOD PRESSURE: 130 MMHG | WEIGHT: 191.4 LBS | RESPIRATION RATE: 20 BRPM

## 2019-06-13 DIAGNOSIS — I10 ESSENTIAL HYPERTENSION: ICD-10-CM

## 2019-06-13 DIAGNOSIS — Z00.00 MEDICARE ANNUAL WELLNESS VISIT, SUBSEQUENT: Primary | ICD-10-CM

## 2019-06-13 DIAGNOSIS — M17.12 OSTEOARTHRITIS OF LEFT KNEE, UNSPECIFIED OSTEOARTHRITIS TYPE: ICD-10-CM

## 2019-06-13 DIAGNOSIS — H93.12 TINNITUS OF LEFT EAR: ICD-10-CM

## 2019-06-13 DIAGNOSIS — E78.00 PURE HYPERCHOLESTEROLEMIA: ICD-10-CM

## 2019-06-13 NOTE — TELEPHONE ENCOUNTER
Pt had appt 6/13/19 and came back in the office to let doctor know the medication given for sleep is called Alprazolam 0.5 mg written by Dr Lakeisha Davila

## 2019-06-13 NOTE — PATIENT INSTRUCTIONS

## 2019-06-13 NOTE — PROGRESS NOTES
CC:  Chief Complaint   Patient presents with    Follow Up Chronic Condition     [facility] wellness visit and fasting labs    Ringing in Ear     Complains of left ear ringing and only sleeping 3 hrs last night. This is the Subsequent Medicare Annual Wellness Exam, performed 12 months or more after the Initial AWV or the last Subsequent AWV    I have reviewed the patient's medical history in detail and updated the computerized patient record. History     78M who presents for AWV. He has long time Tinnitus and has been to see multiple specialist and ENT and no clear etiology. He is very bothered from time to time by the disease. He is also very anxious about his overall health. He reports that the last medication that was given to him by ENT (he does not recall the name) is causing his tinnitus to be worse. I have asked him to stop the medication and to contact ENT to see if an alternative can be give and/or to provide me with the name of this  medication. He has seen Dr. Lennox Person twice and told that this is a chronic condition. He does not believe this and would like to see a different ENT. Additionally he is concerned about pain in his knees and would like to have shots at this time. Will refer to orthopedist.     Past Medical History:   Diagnosis Date    Arrhythmia     Arthritis     knees    Diverticulitis     High cholesterol     Hyperlipidemia     Hypertension     Irregular heart beat     Tinnitus     Tinnitus 03/27/2019      Past Surgical History:   Procedure Laterality Date    COLONOSCOPY N/A 12/17/2018    [facility]     Current Outpatient Medications   Medication Sig Dispense Refill    varicella-zoster recombinant, PF, (SHINGRIX, PF,) 50 mcg/0.5 mL susr injection 0.5 mL by IntraMUSCular route once for 1 dose.  0.5 mL 0    pneumococcal 13 alex conj dip (PREVNAR-13) 0.5 mL syrg injection 0.5 mL by IntraMUSCular route PRIOR TO DISCHARGE for 1 dose. 0.5 mL 0    tetanus-diphtheria toxoids-Td 2-2 Lf unit/0.5 mL injection 0.5 mL by IntraMUSCular route once for 1 dose. 0.5 mL 0    lisinopril (PRINIVIL, ZESTRIL) 20 mg tablet TAKE 1 TABLET BY MOUTH NIGHTLY 90 Tab 0    famotidine (PEPCID) 20 mg tablet TAKE 1 TABLET BY MOUTH TWICE A DAY 60 Tab 2    amLODIPine (NORVASC) 5 mg tablet Take 1 Tab by mouth daily. 90 Tab 1    ALPRAZolam (XANAX) 0.5 mg tablet TAKE 1 TABLET BY MOUTH AT BEDTIME  1    metoprolol succinate (TOPROL-XL) 50 mg XL tablet TAKE 1 TABLET BY MOUTH EVERY DAY 30 Tab 3    busPIRone (BUSPAR) 5 mg tablet Take 1 Tab by mouth two (2) times a day. 180 Tab 1    atorvastatin (LIPITOR) 10 mg tablet Take 1 Tab by mouth daily. 90 Tab 1    multivitamin, tx-iron-ca-min (THERA-M W/ IRON) 9 mg iron-400 mcg tab tablet Take 1 Tab by mouth daily. Allergies   Allergen Reactions    Losartan Other (comments)     Shaking, feeling unbalanced. Family History   Problem Relation Age of Onset    Hypertension Mother     Stroke Father     Diabetes Sister     Hypertension Sister      Social History     Tobacco Use    Smoking status: Never Smoker    Smokeless tobacco: Never Used   Substance Use Topics    Alcohol use: Yes     Alcohol/week: 4.2 - 4.8 oz     Types: 3 - 4 Standard drinks or equivalent, 4 Shots of liquor per week     Comment: socially     Patient Active Problem List   Diagnosis Code    Tinnitus of left ear H93.12    Essential hypertension I10    Pure hypercholesterolemia E78.00    Anxiety F41.9       Depression Risk Factor Screening:     3 most recent PHQ Screens 6/13/2019   Little interest or pleasure in doing things Not at all   Feeling down, depressed, irritable, or hopeless Not at all   Total Score PHQ 2 0     Alcohol Risk Factor Screening: You do not drink alcohol or very rarely. Functional Ability and Level of Safety:   Hearing Loss  Hearing is good.     Activities of Daily Living  The home contains: no safety equipment. Patient does total self care    Fall Risk  Fall Risk Assessment, last 12 mths 6/13/2019   Able to walk? Yes   Fall in past 12 months? No       Abuse Screen  Patient is not abused    Cognitive Screening   Evaluation of Cognitive Function:  Has your family/caregiver stated any concerns about your memory: no  Normal    Patient Care Team   Patient Care Team:  Enrrique Lopez MD as PCP - Erica Morocho MD (Family Practice    Physical Exam:     Visit Vitals  /60 (BP 1 Location: Left arm, BP Patient Position: Sitting)   Pulse (!) 58   Temp 97.5 °F (36.4 °C) (Oral)   Resp 20   Ht 5' 11\" (1.803 m)   Wt 191 lb 6.4 oz (86.8 kg)   SpO2 97%   BMI 26.69 kg/m²     Visit Vitals  /60 (BP 1 Location: Left arm, BP Patient Position: Sitting)   Pulse (!) 58   Temp 97.5 °F (36.4 °C) (Oral)   Resp 20   Ht 5' 11\" (1.803 m)   Wt 191 lb 6.4 oz (86.8 kg)   SpO2 97%   BMI 26.69 kg/m²     General:  Alert, cooperative, no distress. Head:  Normocephalic, without obvious abnormality, atraumatic. Eyes:  Conjunctivae/corneas clear. Pupils equal, round, reactive to light. Extraocular movements intact. Lungs:   Clear to auscultation bilaterally. Chest wall:  No tenderness or deformity. Heart:  Regular rate and rhythm, S1, S2 normal, no murmur, click, rub, or gallop. Abdomen:   Soft, non-tender. Bowel sounds normal. No masses. No organomegaly. Extremities: Extremities normal, atraumatic, no cyanosis or edema. Pulses: 2+ and symmetric all extremities. Skin: Skin color, texture, turgor normal. No rashes or lesions. Lymph nodes: Cervical, supraclavicular, and axillary nodes normal.   Neurologic: CNII-XII intact. Normal strength, sensation, and reflexes throughout.        Assessment/Plan   Education and counseling provided:  Are appropriate based on today's review and evaluation  End-of-Life planning (with patient's consent)  Prostate cancer screening tests (PSA, covered annually)  Colorectal cancer screening tests  Screening for glaucoma  Diabetes screening test    Diagnoses and all orders for this visit:    1. Medicare annual wellness visit, subsequent   Anticipatory guidance discussed. Immunizations reviewed   HM updated. 2. Tinnitus of left ear  -     REFERRAL TO ENT-OTOLARYNGOLOGY    3. Osteoarthritis of left knee, unspecified osteoarthritis type  -     REFERRAL TO ORTHOPEDICS    4. Essential hypertension   Stable on current medications. Continue. No changes. 5. Pure hypercholesterolemia  -     LIPID PANEL  -     CBC WITH AUTOMATED DIFF  -     METABOLIC PANEL, COMPREHENSIVE    Other orders  -     varicella-zoster recombinant, PF, (SHINGRIX, PF,) 50 mcg/0.5 mL susr injection; 0.5 mL by IntraMUSCular route once for 1 dose. -     pneumococcal 13 alex conj dip (PREVNAR-13) 0.5 mL syrg injection; 0.5 mL by IntraMUSCular route PRIOR TO DISCHARGE for 1 dose. -     tetanus-diphtheria toxoids-Td 2-2 Lf unit/0.5 mL injection; 0.5 mL by IntraMUSCular route once for 1 dose. I have discussed the diagnosis with the patient and the intended treatment plan as seen in the above orders. The patient has received an after-visit summary and questions were answered concerning future plans. Asked to return should symptoms worsen or not improve with treatment. Any pending labs and studies will be relayed to patient when they become available. Pt verbalizes understanding of plan of care and denies further questions or concerns at this time. Health Maintenance Due   Topic Date Due    DTaP/Tdap/Td series (1 - Tdap) Ordered    Shingrix Vaccine Age 50> (1 of 2) Oredered    GLAUCOMA SCREENING Q2Y  Discussed - patient to make appointment    Pneumococcal 65+ years (2 of 2 - PCV13) Discussed       Follow-up and Dispositions    · Return in about 1 year (around 6/13/2020), or if symptoms worsen or fail to improve.          Patient Instructions        Well Visit, Over 65: Care Instructions  Your Care Instructions    Physical exams can help you stay healthy. Your doctor has checked your overall health and may have suggested ways to take good care of yourself. He or she also may have recommended tests. At home, you can help prevent illness with healthy eating, regular exercise, and other steps. Follow-up care is a key part of your treatment and safety. Be sure to make and go to all appointments, and call your doctor if you are having problems. It's also a good idea to know your test results and keep a list of the medicines you take. How can you care for yourself at home? · Reach and stay at a healthy weight. This will lower your risk for many problems, such as obesity, diabetes, heart disease, and high blood pressure. · Get at least 30 minutes of exercise on most days of the week. Walking is a good choice. You also may want to do other activities, such as running, swimming, cycling, or playing tennis or team sports. · Do not smoke. Smoking can make health problems worse. If you need help quitting, talk to your doctor about stop-smoking programs and medicines. These can increase your chances of quitting for good. · Protect your skin from too much sun. When you're outdoors from 10 a.m. to 4 p.m., stay in the shade or cover up with clothing and a hat with a wide brim. Wear sunglasses that block UV rays. Even when it's cloudy, put broad-spectrum sunscreen (SPF 30 or higher) on any exposed skin. · See a dentist one or two times a year for checkups and to have your teeth cleaned. · Wear a seat belt in the car. · Limit alcohol to 2 drinks a day for men and 1 drink a day for women. Too much alcohol can cause health problems. Follow your doctor's advice about when to have certain tests. These tests can spot problems early. For men and women  · Cholesterol.  Your doctor will tell you how often to have this done based on your overall health and other things that can increase your risk for heart attack and stroke. · Blood pressure. Have your blood pressure checked during a routine doctor visit. Your doctor will tell you how often to check your blood pressure based on your age, your blood pressure results, and other factors. · Diabetes. Ask your doctor whether you should have tests for diabetes. · Vision. Experts recommend that you have yearly exams for glaucoma and other age-related eye problems. · Hearing. Tell your doctor if you notice any change in your hearing. You can have tests to find out how well you hear. · Colon cancer tests. Keep having colon cancer tests as your doctor recommends. You can have one of several types of tests. · Heart attack and stroke risk. At least every 4 to 6 years, you should have your risk for heart attack and stroke assessed. Your doctor uses factors such as your age, blood pressure, cholesterol, and whether you smoke or have diabetes to show what your risk for a heart attack or stroke is over the next 10 years. · Osteoporosis. Talk to your doctor about whether you should have a bone density test to find out whether you have thinning bones. Also ask your doctor about whether you should take calcium and vitamin D supplements. For women  · Pap test and pelvic exam. You may no longer need a Pap test. Talk with your doctor about whether to stop or continue to have Pap tests. · Breast exam and mammogram. Ask how often you should have a mammogram, which is an X-ray of your breasts. A mammogram can spot breast cancer before it can be felt and when it is easiest to treat. · Thyroid disease. Talk to your doctor about whether to have your thyroid checked as part of a regular physical exam. Women have an increased chance of a thyroid problem. For men  · Prostate exam. Talk to your doctor about whether you should have a blood test (called a PSA test) for prostate cancer.  Experts disagree on whether men should have this test. Some experts recommend that you discuss the benefits and risks of the test with your doctor. · Abdominal aortic aneurysm. Ask your doctor whether you should have a test to check for an aneurysm. You may need a test if you ever smoked or if your parent, brother, sister, or child has had an aneurysm. When should you call for help? Watch closely for changes in your health, and be sure to contact your doctor if you have any problems or symptoms that concern you. Where can you learn more? Go to http://prabhjot-willie.info/. Enter Z038 in the search box to learn more about \"Well Visit, Over 65: Care Instructions. \"  Current as of: March 28, 2018  Content Version: 11.9  © 5054-1025 [employer]. Care instructions adapted under license by [employer] (which disclaims liability or warranty for this information). If you have questions about a medical condition or this instruction, always ask your healthcare professional. [place] [address] any warranty or liability for your use of this information.

## 2019-06-14 LAB
ALBUMIN SERPL-MCNC: 4.3 G/DL (ref 3.5–4.8)
ALBUMIN/GLOB SERPL: 1.6 {RATIO} (ref 1.2–2.2)
ALP SERPL-CCNC: 64 IU/L (ref 39–117)
ALT SERPL-CCNC: 24 IU/L (ref 0–44)
AST SERPL-CCNC: 22 IU/L (ref 0–40)
BASOPHILS # BLD AUTO: 0 X10E3/UL (ref 0–0.2)
BASOPHILS NFR BLD AUTO: 1 %
BILIRUB SERPL-MCNC: 0.7 MG/DL (ref 0–1.2)
BUN SERPL-MCNC: 15 MG/DL (ref 8–27)
BUN/CREAT SERPL: 13 (ref 10–24)
CALCIUM SERPL-MCNC: 9.4 MG/DL (ref 8.6–10.2)
CHLORIDE SERPL-SCNC: 104 MMOL/L (ref 96–106)
CHOLEST SERPL-MCNC: 119 MG/DL (ref 100–199)
CO2 SERPL-SCNC: 24 MMOL/L (ref 20–29)
CREAT SERPL-MCNC: 1.15 MG/DL (ref 0.76–1.27)
EOSINOPHIL # BLD AUTO: 0.3 X10E3/UL (ref 0–0.4)
EOSINOPHIL NFR BLD AUTO: 10 %
ERYTHROCYTE [DISTWIDTH] IN BLOOD BY AUTOMATED COUNT: 13.9 % (ref 12.3–15.4)
GLOBULIN SER CALC-MCNC: 2.7 G/DL (ref 1.5–4.5)
GLUCOSE SERPL-MCNC: 96 MG/DL (ref 65–99)
HCT VFR BLD AUTO: 40.6 % (ref 37.5–51)
HDLC SERPL-MCNC: 36 MG/DL
HGB BLD-MCNC: 13.3 G/DL (ref 13–17.7)
IMM GRANULOCYTES # BLD AUTO: 0 X10E3/UL (ref 0–0.1)
IMM GRANULOCYTES NFR BLD AUTO: 1 %
INTERPRETATION, 910389: NORMAL
LDLC SERPL CALC-MCNC: 59 MG/DL (ref 0–99)
LYMPHOCYTES # BLD AUTO: 1.1 X10E3/UL (ref 0.7–3.1)
LYMPHOCYTES NFR BLD AUTO: 36 %
MCH RBC QN AUTO: 31.1 PG (ref 26.6–33)
MCHC RBC AUTO-ENTMCNC: 32.8 G/DL (ref 31.5–35.7)
MCV RBC AUTO: 95 FL (ref 79–97)
MONOCYTES # BLD AUTO: 0.4 X10E3/UL (ref 0.1–0.9)
MONOCYTES NFR BLD AUTO: 11 %
NEUTROPHILS # BLD AUTO: 1.3 X10E3/UL (ref 1.4–7)
NEUTROPHILS NFR BLD AUTO: 41 %
PLATELET # BLD AUTO: 150 X10E3/UL (ref 150–450)
POTASSIUM SERPL-SCNC: 4.3 MMOL/L (ref 3.5–5.2)
PROT SERPL-MCNC: 7 G/DL (ref 6–8.5)
RBC # BLD AUTO: 4.28 X10E6/UL (ref 4.14–5.8)
SODIUM SERPL-SCNC: 142 MMOL/L (ref 134–144)
TRIGL SERPL-MCNC: 119 MG/DL (ref 0–149)
VLDLC SERPL CALC-MCNC: 24 MG/DL (ref 5–40)
WBC # BLD AUTO: 3.2 X10E3/UL (ref 3.4–10.8)

## 2019-06-21 ENCOUNTER — TELEPHONE (OUTPATIENT)
Dept: FAMILY MEDICINE CLINIC | Age: 79
End: 2019-06-21

## 2019-06-24 NOTE — TELEPHONE ENCOUNTER
Spoke with patient and advised him that his lab results had not been noted by the MD at this time. Assured him that results were fairly good and that once resulted, we would give him a call back if any recommendations were needed.

## 2019-06-27 NOTE — TELEPHONE ENCOUNTER
Voice message left for patient on mobile phone. Labs stable and recheck in 4 to 6 months. Any questions give us a call.

## 2019-07-24 NOTE — TELEPHONE ENCOUNTER
Dr Ana Paula Chou at [address] [facility] is wanting pt to begin taking Meloxicam. Pt is wanting to know if this will be okay for him to begin taking regularly.      Best contact: [phone]

## 2019-07-25 RX ORDER — OMEPRAZOLE 20 MG/1
20 CAPSULE, DELAYED RELEASE ORAL DAILY
Qty: 90 CAP | Refills: 0 | Status: SHIPPED | OUTPATIENT
Start: 2019-07-25 | End: 2019-12-23

## 2019-07-25 NOTE — TELEPHONE ENCOUNTER
Voice message left for patient requesting a return call to relay message from Dr. Milly Lovett regarding Salena Lucien

## 2019-07-25 NOTE — TELEPHONE ENCOUNTER
Spoke with patient and relayed message from Dr. Darian Henning. Patient voices that he would like to try it. Requests an order for Omeprazole be sent in for him.

## 2019-08-07 ENCOUNTER — TELEPHONE (OUTPATIENT)
Dept: FAMILY MEDICINE CLINIC | Age: 79
End: 2019-08-07

## 2019-08-07 DIAGNOSIS — M17.11 OSTEOARTHRITIS OF RIGHT KNEE, UNSPECIFIED OSTEOARTHRITIS TYPE: Primary | ICD-10-CM

## 2019-08-07 DIAGNOSIS — M17.12 OSTEOARTHRITIS OF LEFT KNEE, UNSPECIFIED OSTEOARTHRITIS TYPE: ICD-10-CM

## 2019-08-07 NOTE — TELEPHONE ENCOUNTER
Pt needs a pcp referral for to have physical therapy for bi lateral knee ICD10 code M17.12 and M17.11[facility]  Fax# [phone] - needs today

## 2019-08-22 DIAGNOSIS — I10 ESSENTIAL HYPERTENSION: ICD-10-CM

## 2019-08-22 RX ORDER — LISINOPRIL 20 MG/1
TABLET ORAL
Qty: 90 TAB | Refills: 0 | Status: SHIPPED | OUTPATIENT
Start: 2019-08-22 | End: 2019-10-02 | Stop reason: SDUPTHER

## 2019-08-23 DIAGNOSIS — I10 ESSENTIAL HYPERTENSION: ICD-10-CM

## 2019-08-23 RX ORDER — AMLODIPINE BESYLATE 5 MG/1
TABLET ORAL
Qty: 90 TAB | Refills: 0 | Status: SHIPPED | OUTPATIENT
Start: 2019-08-23 | End: 2019-11-15 | Stop reason: SDUPTHER

## 2019-08-23 RX ORDER — METOPROLOL SUCCINATE 50 MG/1
TABLET, EXTENDED RELEASE ORAL
Qty: 30 TAB | Refills: 0 | Status: SHIPPED | OUTPATIENT
Start: 2019-08-23 | End: 2019-09-26 | Stop reason: SDUPTHER

## 2019-09-19 RX ORDER — ATORVASTATIN CALCIUM 10 MG/1
TABLET, FILM COATED ORAL
Qty: 90 TAB | Refills: 1 | Status: SHIPPED | OUTPATIENT
Start: 2019-09-19 | End: 2020-03-19

## 2019-09-26 DIAGNOSIS — I10 ESSENTIAL HYPERTENSION: ICD-10-CM

## 2019-09-30 RX ORDER — METOPROLOL SUCCINATE 50 MG/1
TABLET, EXTENDED RELEASE ORAL
Qty: 30 TAB | Refills: 0 | Status: SHIPPED | OUTPATIENT
Start: 2019-09-30 | End: 2019-10-24 | Stop reason: SDUPTHER

## 2019-10-02 ENCOUNTER — OFFICE VISIT (OUTPATIENT)
Dept: FAMILY MEDICINE CLINIC | Age: 79
End: 2019-10-02

## 2019-10-02 VITALS
TEMPERATURE: 98.1 F | RESPIRATION RATE: 16 BRPM | HEIGHT: 71 IN | SYSTOLIC BLOOD PRESSURE: 128 MMHG | BODY MASS INDEX: 27.02 KG/M2 | DIASTOLIC BLOOD PRESSURE: 54 MMHG | HEART RATE: 71 BPM | WEIGHT: 193 LBS | OXYGEN SATURATION: 98 %

## 2019-10-02 DIAGNOSIS — I10 ESSENTIAL HYPERTENSION: ICD-10-CM

## 2019-10-02 RX ORDER — LISINOPRIL 20 MG/1
TABLET ORAL
Qty: 180 TAB | Refills: 1 | Status: SHIPPED | OUTPATIENT
Start: 2019-10-02 | End: 2020-02-04 | Stop reason: SDUPTHER

## 2019-10-02 NOTE — PROGRESS NOTES
Identified pt with two pt identifiers(name and ). Chief Complaint   Patient presents with    Elevated Blood Pressure     pt reports BPs at night are up around 981 systolically and he feels he needs increase in his BP - he has tried doubling lisinopril dosage and it seems to keep BPs where they need to be all night. When pressures are up he has ringing in his ears. Health Maintenance Due   Topic    DTaP/Tdap/Td series (1 - Tdap)    Shingrix Vaccine Age 49> (1 of 2)    GLAUCOMA SCREENING Q2Y        Wt Readings from Last 3 Encounters:   10/02/19 193 lb (87.5 kg)   19 191 lb 6.4 oz (86.8 kg)   19 191 lb 12.8 oz (87 kg)     Temp Readings from Last 3 Encounters:   10/02/19 98.1 °F (36.7 °C) (Oral)   19 97.5 °F (36.4 °C) (Oral)   19 97.6 °F (36.4 °C) (Oral)     BP Readings from Last 3 Encounters:   10/02/19 128/54   19 130/60   19 138/66     Pulse Readings from Last 3 Encounters:   10/02/19 71   19 (!) 58   19 60         Learning Assessment:  :     Learning Assessment 2018   PRIMARY LEARNER Patient   BARRIERS PRIMARY LEARNER VISUAL   CO-LEARNER CAREGIVER No   PRIMARY LANGUAGE ENGLISH   LEARNER PREFERENCE PRIMARY DEMONSTRATION     LISTENING     READING   ANSWERED BY patient   RELATIONSHIP SELF       Depression Screening:  :     3 most recent PHQ Screens 2019   Little interest or pleasure in doing things Not at all   Feeling down, depressed, irritable, or hopeless Not at all   Total Score PHQ 2 0       Fall Risk Assessment:  :     Fall Risk Assessment, last 12 mths 2019   Able to walk? Yes   Fall in past 12 months? No       Abuse Screening:  :     Abuse Screening Questionnaire 3/21/2019 2018   Do you ever feel afraid of your partner? N N   Are you in a relationship with someone who physically or mentally threatens you? N N   Is it safe for you to go home?  Y Y       Coordination of Care Questionnaire:  :     1) Have you been to an emergency room, urgent care clinic since your last visit? no   Hospitalized since your last visit? no             2) Have you seen or consulted any other health care providers outside of [address] [place] [address] since your last visit? no  (Include any pap smears or colon screenings in this section.)    3) Do you have an Advance Directive on file? no  Are you interested in receiving information about Advance Directives? no      Reviewed record in preparation for visit and have obtained necessary documentation. Medication reconciliation up to date and corrected with patient at this time.

## 2019-10-02 NOTE — PROGRESS NOTES
Subjective:      Felipa Saleh is a 70 y.o. male here to discuss hypertension and left ear tinnitus. Patient reports that for the past several months, his BP has been elevated between 12 AM - 2:30 AM. At this time, the tinnitus is at his maximum which is distressful. Reports that he has been taking 2 tablets of lisinopril 20 mg at 9:30 PM for the past several months which has been beneficial for both his BP and the tinnitus. He is here today because he will be running out of his medication due to doubling up. Current Outpatient Medications   Medication Sig Dispense Refill    metoprolol succinate (TOPROL-XL) 50 mg XL tablet TAKE 1 TABLET BY MOUTH EVERY DAY 30 Tab 0    atorvastatin (LIPITOR) 10 mg tablet TAKE 1 TABLET BY MOUTH EVERY DAY 90 Tab 1    amLODIPine (NORVASC) 5 mg tablet TAKE 1 TABLET BY MOUTH EVERY DAY 90 Tab 0    lisinopril (PRINIVIL, ZESTRIL) 20 mg tablet TAKE 1 TABLET BY MOUTH NIGHTLY (Patient taking differently: TAKING 2 TABLETS PO ONCE DAILY) 90 Tab 0    omeprazole (PRILOSEC) 20 mg capsule Take 1 Cap by mouth daily. Indications: Stomach Ulcer from Aspirin/Ibuprofen-Like Drugs Prevention 90 Cap 0    famotidine (PEPCID) 20 mg tablet TAKE 1 TABLET BY MOUTH TWICE A DAY 60 Tab 2    ALPRAZolam (XANAX) 0.5 mg tablet TAKE 1 TABLET BY MOUTH AT BEDTIME  1    busPIRone (BUSPAR) 5 mg tablet Take 1 Tab by mouth two (2) times a day. 180 Tab 1    multivitamin, tx-iron-ca-min (THERA-M W/ IRON) 9 mg iron-400 mcg tab tablet Take 1 Tab by mouth daily. Allergies   Allergen Reactions    Losartan Other (comments)     Shaking, feeling unbalanced.         Past Medical History:   Diagnosis Date    Arrhythmia     Arthritis     knees    Diverticulitis     High cholesterol     Hyperlipidemia     Hypertension     Irregular heart beat     Tinnitus     Tinnitus 03/27/2019       Social History     Tobacco Use    Smoking status: Never Smoker    Smokeless tobacco: Never Used   Substance Use Topics  Alcohol use: Yes     Alcohol/week: 7.0 - 8.0 standard drinks     Types: 4 Shots of liquor, 3 - 4 Standard drinks or equivalent per week     Comment: socially        Review of Systems  Pertinent items are noted in HPI. Objective:     Visit Vitals  /54 (BP 1 Location: Left arm, BP Patient Position: Sitting)   Pulse 71   Temp 98.1 °F (36.7 °C) (Oral)   Resp 16   Ht 5' 11\" (1.803 m)   Wt 193 lb (87.5 kg)   SpO2 98%   BMI 26.92 kg/m²      General appearance - alert, well appearing, and in no distress  Eyes - pupils equal and reactive, extraocular eye movements intact, sclera anicteric  Chest - clear to auscultation, no wheezes, rales or rhonchi, symmetric air entry, no tachypnea, retractions or cyanosis  Heart - normal rate, regular rhythm, normal S1, S2, no murmurs, rubs, clicks or gallops    Assessment/Plan:   Apolinar Levy is a 70 y.o. male seen for:     1. Essential hypertension: controlled and per report has been taking a total of 40 mg of lisinopril nightly for the last month or so. I have recommended that he will need to follow up with Dr. Spring Hanson concerning his hypertension and management to which he is in agreement. New Rx for lisinopril sent. - lisinopril (PRINIVIL, ZESTRIL) 20 mg tablet; TAKING 2 TABLETS PO ONCE DAILY  Dispense: 180 Tab; Refill: 1    I have discussed the diagnosis with the patient and the intended plan as seen in the above orders. The patient has received an after-visit summary and questions were answered concerning future plans. I have discussed medication side effects and warnings with the patient as well. Patient verbalizes understanding of plan of care and denies further questions or concerns at this time. Informed patient to return to the office if symptoms worsen or if new symptoms arise.

## 2019-10-24 DIAGNOSIS — I10 ESSENTIAL HYPERTENSION: ICD-10-CM

## 2019-10-24 RX ORDER — METOPROLOL SUCCINATE 50 MG/1
TABLET, EXTENDED RELEASE ORAL
Qty: 30 TAB | Refills: 0 | Status: SHIPPED | OUTPATIENT
Start: 2019-10-24 | End: 2019-11-16 | Stop reason: SDUPTHER

## 2019-11-04 ENCOUNTER — OFFICE VISIT (OUTPATIENT)
Dept: FAMILY MEDICINE CLINIC | Age: 79
End: 2019-11-04

## 2019-11-04 VITALS
RESPIRATION RATE: 20 BRPM | WEIGHT: 190.6 LBS | DIASTOLIC BLOOD PRESSURE: 58 MMHG | HEART RATE: 70 BPM | OXYGEN SATURATION: 97 % | BODY MASS INDEX: 26.68 KG/M2 | HEIGHT: 71 IN | TEMPERATURE: 97.9 F | SYSTOLIC BLOOD PRESSURE: 124 MMHG

## 2019-11-04 DIAGNOSIS — F41.9 ANXIETY: ICD-10-CM

## 2019-11-04 DIAGNOSIS — I10 ESSENTIAL HYPERTENSION: Primary | ICD-10-CM

## 2019-11-04 DIAGNOSIS — F51.04 PSYCHOPHYSIOLOGICAL INSOMNIA: ICD-10-CM

## 2019-11-04 DIAGNOSIS — H93.12 TINNITUS OF LEFT EAR: ICD-10-CM

## 2019-11-04 RX ORDER — UREA 10 %
1 LOTION (ML) TOPICAL
Qty: 30 TAB | Refills: 5 | Status: SHIPPED | OUTPATIENT
Start: 2019-11-04 | End: 2019-12-04

## 2019-11-04 NOTE — PROGRESS NOTES
CC;  Chief Complaint   Patient presents with    Hypertension     Patient complains that blood pressure readings are high at night after going to bed. Has ringing in ears when pressure goes up. Subjective:     Sadaf Jaimes is a 70 y.o. male who presents for follow up of hypertension. He also has a h/o anxiety. Mr. Juliana Richardson is concerned because he experiences ringing in his ears (chronic problem) that mostly happens at night and then this awaken's him and he checks his BP which are elevated in the 150-160/70-80 range. After he settles down, his BP goes down and he is able to get back to sleep. He denies that he snores. He has never had a sleep study. He is taking the following BP medications:    Current Outpatient Medications   Medication Sig Dispense Refill    metoprolol succinate (TOPROL-XL) 50 mg XL tablet TAKE 1 TABLET BY MOUTH EVERY DAY 30 Tab 0    lisinopril (PRINIVIL, ZESTRIL) 20 mg tablet TAKING 2 TABLETS PO ONCE DAILY 180 Tab 1    amLODIPine (NORVASC) 5 mg tablet TAKE 1 TABLET BY MOUTH EVERY DAY 90 Tab 0     We will make some adjustments. He has seen ENT multiple times for the tinnitus and also cardiology. No structural abnormalities have been found. Diet and Lifestyle: generally follows a low fat low cholesterol diet, generally follows a low sodium diet, sedentary, nonsmoker  Home BP Monitoring: is borderline controlled at home, ranging  120-140's/70-90's    Cardiovascular ROS: taking medications as instructed, no medication side effects noted, no TIA's, no chest pain on exertion, no dyspnea on exertion, no swelling of ankles. New concerns:   As above. Patient Active Problem List    Diagnosis Date Noted    Anxiety 09/05/2018    Tinnitus of left ear 01/30/2018    Essential hypertension 01/30/2018    Pure hypercholesterolemia 01/30/2018     Current Outpatient Medications   Medication Sig Dispense Refill    melatonin 1 mg tablet Take 1 Tab by mouth nightly for 30 days.  30 Tab 5    metoprolol succinate (TOPROL-XL) 50 mg XL tablet TAKE 1 TABLET BY MOUTH EVERY DAY 30 Tab 0    lisinopril (PRINIVIL, ZESTRIL) 20 mg tablet TAKING 2 TABLETS PO ONCE DAILY 180 Tab 1    atorvastatin (LIPITOR) 10 mg tablet TAKE 1 TABLET BY MOUTH EVERY DAY 90 Tab 1    amLODIPine (NORVASC) 5 mg tablet TAKE 1 TABLET BY MOUTH EVERY DAY 90 Tab 0    famotidine (PEPCID) 20 mg tablet TAKE 1 TABLET BY MOUTH TWICE A DAY (Patient taking differently: Take 20 mg by mouth two (2) times a day. TAKE 1 TABLET BY MOUTH TWICE A DAY  Indications: gastroesophageal reflux disease) 60 Tab 2    ALPRAZolam (XANAX) 0.5 mg tablet TAKE 1 TABLET BY MOUTH AT BEDTIME  1    busPIRone (BUSPAR) 5 mg tablet Take 1 Tab by mouth two (2) times a day. 180 Tab 1    multivitamin, tx-iron-ca-min (THERA-M W/ IRON) 9 mg iron-400 mcg tab tablet Take 1 Tab by mouth daily.  omeprazole (PRILOSEC) 20 mg capsule Take 1 Cap by mouth daily. Indications: Stomach Ulcer from Aspirin/Ibuprofen-Like Drugs Prevention 90 Cap 0     Allergies   Allergen Reactions    Losartan Other (comments)     Shaking, feeling unbalanced. Past Medical History:   Diagnosis Date    Arrhythmia     Arthritis     knees    Diverticulitis     High cholesterol     Hyperlipidemia     Hypertension     Irregular heart beat     Tinnitus     Tinnitus 03/27/2019     Past Surgical History:   Procedure Laterality Date    COLONOSCOPY N/A 12/17/2018    COLONOSCOPY performed by Darian Reed MD at [facility] ENDOSCOPY     Family History   Problem Relation Age of Onset   [de-identified] Hypertension Mother     Stroke Father     Diabetes Sister     Hypertension Sister      Social History     Tobacco Use    Smoking status: Never Smoker    Smokeless tobacco: Never Used   Substance Use Topics    Alcohol use:  Yes     Alcohol/week: 7.0 - 8.0 standard drinks     Types: 4 Shots of liquor, 3 - 4 Standard drinks or equivalent per week     Comment: socially         Review of Systems, additional:  Pertinent items are noted in HPI. Objective:     Visit Vitals  /58   Pulse 70   Temp 97.9 °F (36.6 °C) (Oral)   Resp 20   Ht 5' 11\" (1.803 m)   Wt 190 lb 9.6 oz (86.5 kg)   SpO2 97%   BMI 26.58 kg/m²     Appearance: alert, well appearing, and in no distress and overweight. General exam: CVS exam BP noted to be well controlled today in office, S1, S2 normal, no gallop, no murmur, chest clear, no JVD, no HSM, no edema, peripheral vascular exam both carotids normal upstroke without bruits, neurological exam alert, oriented, normal speech, no focal findings or movement disorder noted. Lab review: no lab studies available for review at time of visit. Assessment/Plan:     Diagnoses and all orders for this visit:    1. Essential hypertension  Will see if changing the way he takes his blood pressure medications will help with over all symptoms and blood pressure control. Developed the following plan with the patient. Take Metoprolol in the morning (50 mg tablet). Take Lisinopril 20 mgs (at noon)  Take Lisinopril 20 mgs (at bedtime)  Take Amlodipine 5 mgs (at bedtime). Consider a sleep study test if you continue to have the symptoms after changing your medications. 2. Anxiety   Long term and continues to play a role in overall symptoms. He was given Melatonin to help with sleep, which he states works for himself. 3. Psychophysiological insomnia  -     melatonin 1 mg tablet; Take 1 Tab by mouth nightly for 30 days. 4. Tinnitus of left ear   Long term and chronic. No new alarm features. Patient has seen ENT multiple times. No structural issues have been noted. I have discussed the diagnosis with the patient and the intended treatment plan as seen in the above orders. The patient has received an after-visit summary and questions were answered concerning future plans. Asked to return should symptoms worsen or not improve with treatment.  Any pending labs and studies will be relayed to patient when they become available. Pt verbalizes understanding of plan of care and denies further questions or concerns at this time. Follow-up and Dispositions    · Return if symptoms worsen or fail to improve. · Return in 3-4 weeks to follow up blood pressure        Arlene Beach MD    Patient Instructions   Hypertension medication schedule:   1. Take Metoprolol in the morning (50 mg tablet). 2. Take Lisinopril 20 mgs (at noon)    3. Take Lisinopril 20 mgs (at bedtime)  4. Take Amlodipine 5 mgs (at bedtime). Consider a sleep study test if you continue to have the symptoms after changing your medications.

## 2019-11-04 NOTE — PATIENT INSTRUCTIONS
Hypertension medication schedule: 1. Take Metoprolol in the morning (50 mg tablet). 2. Take Lisinopril 20 mgs (at noon) 3. Take Lisinopril 20 mgs (at bedtime) 4. Take Amlodipine 5 mgs (at bedtime). Consider a sleep study test if you continue to have the symptoms after changing your medications.

## 2019-11-04 NOTE — PROGRESS NOTES
Patient presents for complaint that blood pressure is going up after going to sleep. Has ringing in the ears which wakes him up and he checks his blood pressure at that time and its always high in 170's or 180's over 100+. Patient also has headaches at times during those hours. Ringing usually lasts a couple of hours and then he goes back to sleep.   When he wakes up in the morning and blood pressure checked around 9 am is usually normal.

## 2019-11-15 DIAGNOSIS — I10 ESSENTIAL HYPERTENSION: ICD-10-CM

## 2019-11-15 RX ORDER — LISINOPRIL 20 MG/1
TABLET ORAL
Qty: 90 TAB | Refills: 0 | Status: SHIPPED | OUTPATIENT
Start: 2019-11-15 | End: 2019-12-11 | Stop reason: DRUGHIGH

## 2019-11-16 DIAGNOSIS — I10 ESSENTIAL HYPERTENSION: ICD-10-CM

## 2019-11-17 RX ORDER — METOPROLOL SUCCINATE 50 MG/1
TABLET, EXTENDED RELEASE ORAL
Qty: 30 TAB | Refills: 0 | Status: SHIPPED | OUTPATIENT
Start: 2019-11-17 | End: 2019-12-11 | Stop reason: SDUPTHER

## 2019-11-17 RX ORDER — AMLODIPINE BESYLATE 5 MG/1
TABLET ORAL
Qty: 90 TAB | Refills: 0 | Status: SHIPPED | OUTPATIENT
Start: 2019-11-17 | End: 2020-02-04 | Stop reason: DRUGHIGH

## 2019-12-09 DIAGNOSIS — G89.29 CHRONIC PAIN OF BOTH SHOULDERS: ICD-10-CM

## 2019-12-09 DIAGNOSIS — M25.512 CHRONIC PAIN OF BOTH SHOULDERS: ICD-10-CM

## 2019-12-09 DIAGNOSIS — M25.511 CHRONIC PAIN OF BOTH SHOULDERS: ICD-10-CM

## 2019-12-09 RX ORDER — DICLOFENAC SODIUM 50 MG/1
50 TABLET, DELAYED RELEASE ORAL
Qty: 180 TAB | Refills: 0 | Status: SHIPPED | OUTPATIENT
Start: 2019-12-09 | End: 2020-08-22 | Stop reason: SDUPTHER

## 2019-12-11 ENCOUNTER — TELEPHONE (OUTPATIENT)
Dept: FAMILY MEDICINE CLINIC | Age: 79
End: 2019-12-11

## 2019-12-11 DIAGNOSIS — I10 ESSENTIAL HYPERTENSION: ICD-10-CM

## 2019-12-11 RX ORDER — METOPROLOL SUCCINATE 50 MG/1
TABLET, EXTENDED RELEASE ORAL
Qty: 30 TAB | Refills: 0 | Status: SHIPPED | OUTPATIENT
Start: 2019-12-11 | End: 2020-01-26

## 2019-12-11 RX ORDER — LISINOPRIL 20 MG/1
20 TABLET ORAL 2 TIMES DAILY
Qty: 60 TAB | Refills: 5 | Status: SHIPPED | OUTPATIENT
Start: 2019-12-11 | End: 2019-12-18 | Stop reason: SDUPTHER

## 2019-12-11 NOTE — TELEPHONE ENCOUNTER
Patient came by office and stated that he is suppose to be taking lisinopril twice a day not once. Can you please look at it and advise or send over new script for twice a day. Please advise.

## 2019-12-12 NOTE — TELEPHONE ENCOUNTER
Pt was advised via detailed TM. I have also mailed him a letter with results/recommendations. seizures never

## 2019-12-17 NOTE — PROGRESS NOTES
HISTORY OF PRESENTING ILLNESS      Tariq Valentin is a 70 y.o. male with history of hypertension, dyslipidemia, and hx of PACs/PVCs on previous EKGs.  He underwent a 24-hour Holter monitor which demonstrated PACs which were nonconducted at times resulting in compensatory pauses. He is currently treated with metoprolol 50mg and reports that his palpitations have resolved. HIs blood pressures have normalized with addition of lisinopril and amlodipine by his PCP. EKG shows sinus rhythm. ACTIVE PROBLEM LIST     Patient Active Problem List    Diagnosis Date Noted    Anxiety 09/05/2018    Tinnitus of left ear 01/30/2018    Essential hypertension 01/30/2018    Pure hypercholesterolemia 01/30/2018           PAST MEDICAL HISTORY     Past Medical History:   Diagnosis Date    Arrhythmia     Arthritis     knees    Diverticulitis     High cholesterol     Hyperlipidemia     Hypertension     Irregular heart beat     Tinnitus     Tinnitus 03/27/2019           PAST SURGICAL HISTORY     Past Surgical History:   Procedure Laterality Date    COLONOSCOPY N/A 12/17/2018    COLONOSCOPY performed by Kianna Jaimes MD at [facility] ENDOSCOPY          ALLERGIES     Allergies   Allergen Reactions    Losartan Other (comments)     Shaking, feeling unbalanced. FAMILY HISTORY     Family History   Problem Relation Age of Onset    Hypertension Mother    Flavia Jessica Stroke Father     Diabetes Sister     Hypertension Sister     negative for cardiac disease       SOCIAL HISTORY     Social History     Socioeconomic History    Marital status: SINGLE     Spouse name: Not on file    Number of children: Not on file    Years of education: Not on file    Highest education level: Not on file   Tobacco Use    Smoking status: Never Smoker    Smokeless tobacco: Never Used   Substance and Sexual Activity    Alcohol use:  Yes     Alcohol/week: 7.0 - 8.0 standard drinks     Types: 4 Shots of liquor, 3 - 4 Standard drinks or equivalent per week     Comment: socially    Drug use: Never    Sexual activity: Never   Social History Narrative    ** Merged History Encounter **              MEDICATIONS     Current Outpatient Medications   Medication Sig    metoprolol succinate (TOPROL-XL) 50 mg XL tablet TAKE 1 TABLET BY MOUTH EVERY DAY    lisinopril (PRINIVIL, ZESTRIL) 20 mg tablet Take 1 Tab by mouth two (2) times a day for 30 days.  diclofenac EC (VOLTAREN) 50 mg EC tablet TAKE 1 TAB BY MOUTH TWO (2) TIMES DAILY AS NEEDED.  amLODIPine (NORVASC) 5 mg tablet TAKE 1 TABLET BY MOUTH EVERY DAY    lisinopril (PRINIVIL, ZESTRIL) 20 mg tablet TAKING 2 TABLETS PO ONCE DAILY    atorvastatin (LIPITOR) 10 mg tablet TAKE 1 TABLET BY MOUTH EVERY DAY    omeprazole (PRILOSEC) 20 mg capsule Take 1 Cap by mouth daily. Indications: Stomach Ulcer from Aspirin/Ibuprofen-Like Drugs Prevention    famotidine (PEPCID) 20 mg tablet TAKE 1 TABLET BY MOUTH TWICE A DAY (Patient taking differently: Take 20 mg by mouth two (2) times a day. TAKE 1 TABLET BY MOUTH TWICE A DAY  Indications: gastroesophageal reflux disease)    ALPRAZolam (XANAX) 0.5 mg tablet TAKE 1 TABLET BY MOUTH AT BEDTIME    busPIRone (BUSPAR) 5 mg tablet Take 1 Tab by mouth two (2) times a day.  multivitamin, tx-iron-ca-min (THERA-M W/ IRON) 9 mg iron-400 mcg tab tablet Take 1 Tab by mouth daily. No current facility-administered medications for this visit. I have reviewed the nurses notes, vitals, problem list, allergy list, medical history, family, social history and medications. REVIEW OF SYMPTOMS      General: Pt denies excessive weight gain or loss. Pt is able to conduct ADL's  HEENT: Denies blurred vision, headaches, hearing loss, epistaxis and difficulty swallowing. Respiratory: Denies cough, congestion, shortness of breath, ALANIS, wheezing or stridor.   Cardiovascular: Denies precordial pain, palpitations, edema or PND  Gastrointestinal: Denies poor appetite, indigestion, abdominal pain or blood in stool  Genitourinary: Denies hematuria, dysuria, increased urinary frequency  Musculoskeletal: Denies joint pain or swelling from muscles or joints  Neurologic: Denies tremor, paresthesias, headache, or sensory motor disturbance  Psychiatric: Denies confusion, insomnia, depression  Integumentray: Denies rash, itching or ulcers. Hematologic: Denies easy bruising, bleeding       PHYSICAL EXAMINATION      There were no vitals filed for this visit. General: Well developed, in no acute distress. HEENT: No jaundice, oral mucosa moist, no oral ulcers  Neck: Supple, no stiffness, no lymphadenopathy, supple  Heart:  Normal S1/S2 negative S3 or S4. Regular, no murmur, gallop or rub, no jugular venous distention  Respiratory: Clear bilaterally x 4, no wheezing or rales  Abdomen:   Soft, non-tender, bowel sounds are active.   Extremities:  No edema, normal cap refill, no cyanosis. Musculoskeletal: No clubbing, no deformities  Neuro: A&Ox3, speech clear, gait stable, cooperative, no focal neurologic deficits  Skin: Skin color is normal. No rashes or lesions.  Non diaphoretic, moist.  Vascular: 2+ pulses symmetric in all extremities       DIAGNOSTIC DATA      EKG: sinus rhythm        LABORATORY DATA      Lab Results   Component Value Date/Time    WBC 3.2 (L) 06/13/2019 10:30 AM    Hemoglobin (POC) 15.6 12/03/2017 10:40 AM    HGB 13.3 06/13/2019 10:30 AM    Hematocrit (POC) 46 12/03/2017 10:40 AM    HCT 40.6 06/13/2019 10:30 AM    PLATELET 079 82/04/1092 10:30 AM    MCV 95 06/13/2019 10:30 AM      Lab Results   Component Value Date/Time    Sodium 142 06/13/2019 10:30 AM    Potassium 4.3 06/13/2019 10:30 AM    Chloride 104 06/13/2019 10:30 AM    CO2 24 06/13/2019 10:30 AM    Anion gap 8 10/18/2018 11:42 AM    Glucose 96 06/13/2019 10:30 AM    BUN 15 06/13/2019 10:30 AM    Creatinine 1.15 06/13/2019 10:30 AM    BUN/Creatinine ratio 13 06/13/2019 10:30 AM    GFR est AA 71 06/13/2019 10:30 AM GFR est non-AA 61 06/13/2019 10:30 AM    Calcium 9.4 06/13/2019 10:30 AM    Bilirubin, total 0.7 06/13/2019 10:30 AM    AST (SGOT) 22 06/13/2019 10:30 AM    Alk. phosphatase 64 06/13/2019 10:30 AM    Protein, total 7.0 06/13/2019 10:30 AM    Albumin 4.3 06/13/2019 10:30 AM    Globulin 3.8 10/18/2018 11:42 AM    A-G Ratio 1.6 06/13/2019 10:30 AM    ALT (SGPT) 24 06/13/2019 10:30 AM           ASSESSMENT      1. Premature atrial contractions              A. Palpitations              B. Nonconducted at times with compensatory pause  2. Hypertension  3. Dyslipidemia  4. PVCs     PLAN     Continue to monitor clinically for recurrence of palpitations. Should symptoms progress, will consider repeat monitoring. FOLLOW-UP   1 year    Thank you, Luisa Castañeda MD and Dr. Bassam Dumont for allowing me to participate in the care of this extraordinarily pleasant male. Please do not hesitate to contact me for further questions/concerns.        Aaron Hennessy NP

## 2019-12-18 ENCOUNTER — OFFICE VISIT (OUTPATIENT)
Dept: CARDIOLOGY CLINIC | Age: 79
End: 2019-12-18

## 2019-12-18 VITALS
BODY MASS INDEX: 28 KG/M2 | RESPIRATION RATE: 14 BRPM | DIASTOLIC BLOOD PRESSURE: 66 MMHG | WEIGHT: 200 LBS | OXYGEN SATURATION: 94 % | HEART RATE: 71 BPM | HEIGHT: 71 IN | SYSTOLIC BLOOD PRESSURE: 136 MMHG

## 2019-12-18 DIAGNOSIS — I10 ESSENTIAL HYPERTENSION: ICD-10-CM

## 2019-12-18 DIAGNOSIS — R00.2 PALPITATIONS: ICD-10-CM

## 2019-12-18 DIAGNOSIS — I49.1 PREMATURE ATRIAL CONTRACTIONS: Primary | ICD-10-CM

## 2019-12-18 DIAGNOSIS — E78.00 PURE HYPERCHOLESTEROLEMIA: ICD-10-CM

## 2019-12-23 ENCOUNTER — OFFICE VISIT (OUTPATIENT)
Dept: FAMILY MEDICINE CLINIC | Age: 79
End: 2019-12-23

## 2019-12-23 VITALS
BODY MASS INDEX: 27.3 KG/M2 | DIASTOLIC BLOOD PRESSURE: 72 MMHG | OXYGEN SATURATION: 98 % | HEART RATE: 72 BPM | SYSTOLIC BLOOD PRESSURE: 140 MMHG | HEIGHT: 71 IN | WEIGHT: 195 LBS | TEMPERATURE: 98.2 F | RESPIRATION RATE: 18 BRPM

## 2019-12-23 DIAGNOSIS — G56.01 CARPAL TUNNEL SYNDROME OF RIGHT WRIST: Primary | ICD-10-CM

## 2019-12-23 NOTE — PATIENT INSTRUCTIONS
Carpal Tunnel Syndrome: Care Instructions Your Care Instructions Carpal tunnel syndrome is a nerve problem. It can cause tingling, numbness, weakness, or pain in the fingers, thumb, and hand. The median nerve and several tough tissues called tendons run through a space in the wrist called the carpal tunnel. The repeated hand motions used in work and some hobbies and sports can put pressure on the nerve. Pregnancy and several conditions, including diabetes, arthritis, and an underactive thyroid, also can cause carpal tunnel syndrome. You may be able to limit an activity or do it differently to reduce your symptoms. You also can take other steps to feel better. If your symptoms are mild, 1 to 2 weeks of home treatment are likely to ease your pain. Surgery is needed only if other treatments do not work. Follow-up care is a key part of your treatment and safety. Be sure to make and go to all appointments, and call your doctor if you are having problems. It's also a good idea to know your test results and keep a list of the medicines you take. How can you care for yourself at home? · If possible, stop or reduce the activity that causes your symptoms. If you cannot stop the activity, take frequent breaks to rest and stretch or change hand positions to do a task. Try switching hands, such as when using a computer mouse. · Try to avoid bending or twisting your wrists. · Ask your doctor if you can take an over-the-counter pain medicine, such as acetaminophen (Tylenol), ibuprofen (Advil, Motrin), or naproxen (Aleve). Be safe with medicines. Read and follow all instructions on the label. · If your doctor prescribes corticosteroid medicine to help reduce pain and swelling, take it exactly as prescribed. Call your doctor if you think you are having a problem with your medicine. · Put ice or a cold pack on your wrist for 10 to 20 minutes at a time to ease pain. Put a thin cloth between the ice and your skin. · If your doctor or your physical or occupational therapist tells you to wear a wrist splint, wear it as directed to keep your wrist in a neutral position. This also eases pressure on your median nerve. · Ask your doctor whether you should have physical or occupational therapy to learn how to do tasks differently. · Try a yoga class to stretch your muscles and build strength in your hands and wrists. Yoga has been shown to ease carpal tunnel symptoms. To prevent carpal tunnel · When working at a computer, keep your hands and wrists in line with your forearms. Hold your elbows close to your sides. Take a break every 10 to 15 minutes. · Try these exercises: 
? Warm up: Rotate your wrist up, down, and from side to side. Repeat this 4 times. Stretch your fingers far apart, relax them, then stretch them again. Repeat 4 times. Stretch your thumb by pulling it back gently, holding it, and then releasing it. Repeat 4 times. ? Prayer stretch: Start with your palms together in front of your chest just below your chin. Slowly lower your hands toward your waistline while keeping your hands close to your stomach and your palms together until you feel a mild to moderate stretch under your forearms. Hold for 10 to 20 seconds. Repeat 4 times. ? Wrist flexor stretch: Hold your arm in front of you with your palm up. Bend your wrist, pointing your hand toward the floor. With your other hand, gently bend your wrist further until you feel a mild to moderate stretch in your forearm. Hold for 10 to 20 seconds. Repeat 4 times. ? Wrist extensor stretch: Repeat the steps for the wrist flexor stretch, but begin with your extended hand palm down. · Squeeze a rubber exercise ball several times a day to keep your hands and fingers strong. · Avoid holding objects (such as a book) in one position for a long time. When possible, use your whole hand to grasp an object.  Using just the thumb and index finger can put stress on the wrist. 
 · Do not smoke. It can make this condition worse by reducing blood flow to the median nerve. If you need help quitting, talk to your doctor about stop-smoking programs and medicines. These can increase your chances of quitting for good. When should you call for help? Watch closely for changes in your health, and be sure to contact your doctor if: 
  · Your pain or other problems do not get better with home care.  
  · You want more information about physical or occupational therapy.  
  · You have side effects of your corticosteroid medicine, such as: 
? Weight gain. ? Mood changes. ? Trouble sleeping. ? Bruising easily.  
  · You have any other problems with your medicine. Where can you learn more? Go to http://prabhjot-willie.info/. Enter R432 in the search box to learn more about \"Carpal Tunnel Syndrome: Care Instructions. \" Current as of: June 26, 2019 Content Version: 12.2 © 4611-8192 [employer]. Care instructions adapted under license by [email or web address] (which disclaims liability or warranty for this information). If you have questions about a medical condition or this instruction, always ask your healthcare professional. [place] [address] any warranty or liability for your use of this information. Carpal Tunnel Syndrome: Exercises Introduction Here are some examples of exercises for you to try. The exercises may be suggested for a condition or for rehabilitation. Start each exercise slowly. Ease off the exercises if you start to have pain. You will be told when to start these exercises and which ones will work best for you. Warm-up stretches When you no longer have pain or numbness, you can do exercises to help prevent carpal tunnel syndrome from coming back. Do not do any stretch or movement that is uncomfortable or painful. 1. Rotate your wrist up, down, and from side to side. Repeat 4 times. 2. Stretch your fingers far apart. Relax them, and then stretch them again. Repeat 4 times. 3. Stretch your thumb by pulling it back gently, holding it, and then releasing it. Repeat 4 times. How to do the exercises Prayer stretch 1. Start with your palms together in front of your chest just below your chin. 2. Slowly lower your hands toward your waistline, keeping your hands close to your stomach and your palms together until you feel a mild to moderate stretch under your forearms. 3. Hold for at least 15 to 30 seconds. Repeat 2 to 4 times. Wrist flexor stretch 1. Extend your arm in front of you with your palm up. 2. Bend your wrist, pointing your hand toward the floor. 3. With your other hand, gently bend your wrist farther until you feel a mild to moderate stretch in your forearm. 4. Hold for at least 15 to 30 seconds. Repeat 2 to 4 times. Wrist extensor stretch 1. Repeat steps 1 through 4 of the stretch above, but begin with your extended hand palm down. Follow-up care is a key part of your treatment and safety. Be sure to make and go to all appointments, and call your doctor if you are having problems. It's also a good idea to know your test results and keep a list of the medicines you take. Where can you learn more? Go to http://prabhjot-willie.info/. Enter A798 in the search box to learn more about \"Carpal Tunnel Syndrome: Exercises. \" Current as of: June 26, 2019 Content Version: 12.2 © 8838-6201 [employer]. Care instructions adapted under license by [employer] (which disclaims liability or warranty for this information). If you have questions about a medical condition or this instruction, always ask your healthcare professional. [place] [address] any warranty or liability for your use of this information.

## 2019-12-23 NOTE — PROGRESS NOTES
Identified pt with two pt identifiers(name and ). Chief Complaint   Patient presents with    Numbness     first 3 fingers in right hand. began about a month ago. getting worse over time        Health Maintenance Due   Topic    DTaP/Tdap/Td series (1 - Tdap)    Shingrix Vaccine Age 50> (1 of 2)    GLAUCOMA SCREENING Q2Y        Wt Readings from Last 3 Encounters:   19 195 lb (88.5 kg)   19 200 lb (90.7 kg)   19 190 lb 9.6 oz (86.5 kg)     Temp Readings from Last 3 Encounters:   19 98.2 °F (36.8 °C) (Oral)   19 97.9 °F (36.6 °C) (Oral)   10/02/19 98.1 °F (36.7 °C) (Oral)     BP Readings from Last 3 Encounters:   19 140/72   19 136/66   19 124/58     Pulse Readings from Last 3 Encounters:   19 72   19 71   19 70         Learning Assessment:  :     Learning Assessment 2018   PRIMARY LEARNER Patient   BARRIERS PRIMARY LEARNER VISUAL   CO-LEARNER CAREGIVER No   PRIMARY LANGUAGE ENGLISH   LEARNER PREFERENCE PRIMARY DEMONSTRATION     LISTENING     READING   ANSWERED BY patient   RELATIONSHIP SELF       Depression Screening:  :     3 most recent PHQ Screens 2019   Little interest or pleasure in doing things Not at all   Feeling down, depressed, irritable, or hopeless Not at all   Total Score PHQ 2 0       Fall Risk Assessment:  :     Fall Risk Assessment, last 12 mths 2019   Able to walk? Yes   Fall in past 12 months? No       Abuse Screening:  :     Abuse Screening Questionnaire 3/21/2019 2018   Do you ever feel afraid of your partner? N N   Are you in a relationship with someone who physically or mentally threatens you? N N   Is it safe for you to go home?  Y Y       Coordination of Care Questionnaire:  :     1) Have you been to an emergency room, urgent care clinic since your last visit? no   Hospitalized since your last visit? no             2) Have you seen or consulted any other health care providers outside of [facility] since your last visit? no  (Include any pap smears or colon screenings in this section.)    3) Do you have an Advance Directive on file? no  Are you interested in receiving information about Advance Directives? no    Reviewed record in preparation for visit and have obtained necessary documentation. Medication reconciliation up to date and corrected with patient at this time.

## 2020-01-25 DIAGNOSIS — I10 ESSENTIAL HYPERTENSION: ICD-10-CM

## 2020-01-26 RX ORDER — METOPROLOL SUCCINATE 50 MG/1
TABLET, EXTENDED RELEASE ORAL
Qty: 30 TAB | Refills: 0 | Status: SHIPPED | OUTPATIENT
Start: 2020-01-26 | End: 2020-02-20

## 2020-02-04 ENCOUNTER — HOSPITAL ENCOUNTER (OUTPATIENT)
Dept: LAB | Age: 80
Discharge: HOME | End: 2020-02-04

## 2020-02-04 ENCOUNTER — OFFICE VISIT (OUTPATIENT)
Dept: FAMILY MEDICINE CLINIC | Age: 80
End: 2020-02-04

## 2020-02-04 VITALS
RESPIRATION RATE: 16 BRPM | SYSTOLIC BLOOD PRESSURE: 129 MMHG | DIASTOLIC BLOOD PRESSURE: 61 MMHG | BODY MASS INDEX: 27.02 KG/M2 | HEART RATE: 68 BPM | OXYGEN SATURATION: 96 % | TEMPERATURE: 97.8 F | HEIGHT: 71 IN | WEIGHT: 193 LBS

## 2020-02-04 DIAGNOSIS — I10 ESSENTIAL HYPERTENSION: ICD-10-CM

## 2020-02-04 DIAGNOSIS — I10 ESSENTIAL HYPERTENSION: Primary | ICD-10-CM

## 2020-02-04 LAB
ALBUMIN SERPL-MCNC: 4 G/DL (ref 3.5–5)
ALBUMIN/GLOB SERPL: 1.2 {RATIO} (ref 1.1–2.2)
ALP SERPL-CCNC: 70 U/L (ref 45–117)
ALT SERPL-CCNC: 30 U/L (ref 12–78)
ANION GAP SERPL CALC-SCNC: 2 MMOL/L (ref 5–15)
AST SERPL-CCNC: 28 U/L (ref 15–37)
BILIRUB SERPL-MCNC: 0.5 MG/DL (ref 0.2–1)
BUN SERPL-MCNC: 18 MG/DL (ref 6–20)
BUN/CREAT SERPL: 13 (ref 12–20)
CALCIUM SERPL-MCNC: 9.3 MG/DL (ref 8.5–10.1)
CHLORIDE SERPL-SCNC: 110 MMOL/L (ref 97–108)
CO2 SERPL-SCNC: 27 MMOL/L (ref 21–32)
CREAT SERPL-MCNC: 1.39 MG/DL (ref 0.7–1.3)
ERYTHROCYTE [DISTWIDTH] IN BLOOD BY AUTOMATED COUNT: 12.1 % (ref 11.5–14.5)
GLOBULIN SER CALC-MCNC: 3.4 G/DL (ref 2–4)
GLUCOSE SERPL-MCNC: 102 MG/DL (ref 65–100)
HCT VFR BLD AUTO: 40.9 % (ref 36.6–50.3)
HGB BLD-MCNC: 13.2 G/DL (ref 12.1–17)
MCH RBC QN AUTO: 31.4 PG (ref 26–34)
MCHC RBC AUTO-ENTMCNC: 32.3 G/DL (ref 30–36.5)
MCV RBC AUTO: 97.4 FL (ref 80–99)
NRBC # BLD: 0 K/UL (ref 0–0.01)
NRBC BLD-RTO: 0 PER 100 WBC
PLATELET # BLD AUTO: 175 K/UL (ref 150–400)
PMV BLD AUTO: 10.8 FL (ref 8.9–12.9)
POTASSIUM SERPL-SCNC: 4.5 MMOL/L (ref 3.5–5.1)
PROT SERPL-MCNC: 7.4 G/DL (ref 6.4–8.2)
RBC # BLD AUTO: 4.2 M/UL (ref 4.1–5.7)
SODIUM SERPL-SCNC: 139 MMOL/L (ref 136–145)
WBC # BLD AUTO: 3.8 K/UL (ref 4.1–11.1)

## 2020-02-04 RX ORDER — LISINOPRIL 20 MG/1
TABLET ORAL
Qty: 180 TAB | Refills: 1 | Status: SHIPPED | OUTPATIENT
Start: 2020-02-04 | End: 2020-04-30 | Stop reason: SDUPTHER

## 2020-02-04 RX ORDER — AMLODIPINE BESYLATE 10 MG/1
10 TABLET ORAL DAILY
Qty: 90 TAB | Refills: 0 | Status: SHIPPED | OUTPATIENT
Start: 2020-02-04 | End: 2020-04-14

## 2020-02-04 NOTE — PATIENT INSTRUCTIONS
High Blood Pressure: Care Instructions Overview It's normal for blood pressure to go up and down throughout the day. But if it stays up, you have high blood pressure. Another name for high blood pressure is hypertension. Despite what a lot of people think, high blood pressure usually doesn't cause headaches or make you feel dizzy or lightheaded. It usually has no symptoms. But it does increase your risk of stroke, heart attack, and other problems. You and your doctor will talk about your risks of these problems based on your blood pressure. Your doctor will give you a goal for your blood pressure. Your goal will be based on your health and your age. Lifestyle changes, such as eating healthy and being active, are always important to help lower blood pressure. You might also take medicine to reach your blood pressure goal. 
Follow-up care is a key part of your treatment and safety. Be sure to make and go to all appointments, and call your doctor if you are having problems. It's also a good idea to know your test results and keep a list of the medicines you take. How can you care for yourself at home? Medical treatment · If you stop taking your medicine, your blood pressure will go back up. You may take one or more types of medicine to lower your blood pressure. Be safe with medicines. Take your medicine exactly as prescribed. Call your doctor if you think you are having a problem with your medicine. · Talk to your doctor before you start taking aspirin every day. Aspirin can help certain people lower their risk of a heart attack or stroke. But taking aspirin isn't right for everyone, because it can cause serious bleeding. · See your doctor regularly. You may need to see the doctor more often at first or until your blood pressure comes down. · If you are taking blood pressure medicine, talk to your doctor before you take decongestants or anti-inflammatory medicine, such as ibuprofen. Some of these medicines can raise blood pressure. · Learn how to check your blood pressure at home. Lifestyle changes · Stay at a healthy weight. This is especially important if you put on weight around the waist. Losing even 10 pounds can help you lower your blood pressure. · If your doctor recommends it, get more exercise. Walking is a good choice. Bit by bit, increase the amount you walk every day. Try for at least 30 minutes on most days of the week. You also may want to swim, bike, or do other activities. · Avoid or limit alcohol. Talk to your doctor about whether you can drink any alcohol. · Try to limit how much sodium you eat to less than 2,300 milligrams (mg) a day. Your doctor may ask you to try to eat less than 1,500 mg a day. · Eat plenty of fruits (such as bananas and oranges), vegetables, legumes, whole grains, and low-fat dairy products. · Lower the amount of saturated fat in your diet. Saturated fat is found in animal products such as milk, cheese, and meat. Limiting these foods may help you lose weight and also lower your risk for heart disease. · Do not smoke. Smoking increases your risk for heart attack and stroke. If you need help quitting, talk to your doctor about stop-smoking programs and medicines. These can increase your chances of quitting for good. When should you call for help? Call  911 anytime you think you may need emergency care. This may mean having symptoms that suggest that your blood pressure is causing a serious heart or blood vessel problem. Your blood pressure may be over 180/120. 
 For example, call  911 if: 
  · You have symptoms of a heart attack. These may include: 
? Chest pain or pressure, or a strange feeling in the chest. 
? Sweating. ? Shortness of breath. ? Nausea or vomiting. ? Pain, pressure, or a strange feeling in the back, neck, jaw, or upper belly or in one or both shoulders or arms. ? Lightheadedness or sudden weakness. ? A fast or irregular heartbeat.  
  · You have symptoms of a stroke. These may include: 
? Sudden numbness, tingling, weakness, or loss of movement in your face, arm, or leg, especially on only one side of your body. ? Sudden vision changes. ? Sudden trouble speaking. ? Sudden confusion or trouble understanding simple statements. ? Sudden problems with walking or balance. ? A sudden, severe headache that is different from past headaches.  
  · You have severe back or belly pain.  
 Do not wait until your blood pressure comes down on its own. Get help right away. 
 Call your doctor now or seek immediate care if: 
  · Your blood pressure is much higher than normal (such as 180/120 or higher), but you don't have symptoms.  
  · You think high blood pressure is causing symptoms, such as: 
? Severe headache. 
? Blurry vision.  
 Watch closely for changes in your health, and be sure to contact your doctor if: 
  · Your blood pressure measures higher than your doctor recommends at least 2 times. That means the top number is higher or the bottom number is higher, or both.  
  · You think you may be having side effects from your blood pressure medicine. Where can you learn more? Go to http://prabhjot-willie.info/. Enter G744 in the search box to learn more about \"High Blood Pressure: Care Instructions. \" Current as of: April 9, 2019 Content Version: 12.2 © 0466-1525 [employer]. Care instructions adapted under license by [employer] (which disclaims liability or warranty for this information). If you have questions about a medical condition or this instruction, always ask your healthcare professional. [place] [address] any warranty or liability for your use of this information.

## 2020-02-04 NOTE — PROGRESS NOTES
Identified pt with two pt identifiers(name and ). Chief Complaint   Patient presents with    Hypertension     feels that his BP is not as controlled in the early hours of the morning at approx 1 am, so he has been taking 2 lisinopril tablets in the evening and one tablet in the morning    Medication Refill        Health Maintenance Due   Topic    DTaP/Tdap/Td series (1 - Tdap)    Shingrix Vaccine Age 50> (1 of 2)    GLAUCOMA SCREENING Q2Y        Wt Readings from Last 3 Encounters:   20 193 lb (87.5 kg)   19 195 lb (88.5 kg)   19 200 lb (90.7 kg)     Temp Readings from Last 3 Encounters:   20 97.8 °F (36.6 °C) (Oral)   19 98.2 °F (36.8 °C) (Oral)   19 97.9 °F (36.6 °C) (Oral)     BP Readings from Last 3 Encounters:   20 129/61   19 140/72   19 136/66     Pulse Readings from Last 3 Encounters:   20 68   19 72   19 71         Learning Assessment:  :     Learning Assessment 2018   PRIMARY LEARNER Patient   BARRIERS PRIMARY LEARNER VISUAL   CO-LEARNER CAREGIVER No   PRIMARY LANGUAGE ENGLISH   LEARNER PREFERENCE PRIMARY DEMONSTRATION     LISTENING     READING   ANSWERED BY patient   RELATIONSHIP SELF       Depression Screening:  :     3 most recent PHQ Screens 2019   Little interest or pleasure in doing things Not at all   Feeling down, depressed, irritable, or hopeless Not at all   Total Score PHQ 2 0       Fall Risk Assessment:  :     Fall Risk Assessment, last 12 mths 2019   Able to walk? Yes   Fall in past 12 months? No       Abuse Screening:  :     Abuse Screening Questionnaire 3/21/2019 2018   Do you ever feel afraid of your partner? N N   Are you in a relationship with someone who physically or mentally threatens you? N N   Is it safe for you to go home?  Y Y           Coordination of Care Questionnaire:  :     1) Have you been to an emergency room, urgent care clinic since your last visit? no   Hospitalized since your last visit? no             2) Have you seen or consulted any other health care providers outside of [address] [place] [address] since your last visit? no  (Include any pap smears or colon screenings in this section.)    3) Do you have an Advance Directive on file? no  Are you interested in receiving information about Advance Directives? no      Reviewed record in preparation for visit and have obtained necessary documentation. Medication reconciliation up to date and corrected with patient at this time.

## 2020-02-04 NOTE — PROGRESS NOTES
Subjective:     Axel Lofton is a 70 y.o. male who presents for follow up of hypertension. Diet and Lifestyle: generally follows a low fat low cholesterol diet  Home BP Monitoring: is not well controlled at home, ranging 150's/80-90's    Cardiovascular ROS: taking medications as instructed, no medication side effects noted, no TIA's, no chest pain on exertion, no dyspnea on exertion, no swelling of ankles. New concerns: Pt states that his blood pressure has been spiking at home, at night time. He notes this around 1-4 am.  He states that he checked his BP at this time, as he had increased ringing in his ears. Pt has tried increasing his Lisinopril, which works on his BP, but he did not know if he should continue this dosing, or what? Patient Active Problem List    Diagnosis Date Noted    Anxiety 09/05/2018    Tinnitus of left ear 01/30/2018    Essential hypertension 01/30/2018    Pure hypercholesterolemia 01/30/2018     Current Outpatient Medications   Medication Sig Dispense Refill    amLODIPine (NORVASC) 10 mg tablet Take 1 Tab by mouth daily. 90 Tab 0    lisinopril (PRINIVIL, ZESTRIL) 20 mg tablet TAKING 2 TABLETS PO ONCE DAILY 180 Tab 1    metoprolol succinate (TOPROL-XL) 50 mg XL tablet TAKE 1 TABLET BY MOUTH EVERY DAY (Patient taking differently: Take 50 mg by mouth every morning.) 30 Tab 0    diclofenac EC (VOLTAREN) 50 mg EC tablet TAKE 1 TAB BY MOUTH TWO (2) TIMES DAILY AS NEEDED. 180 Tab 0    atorvastatin (LIPITOR) 10 mg tablet TAKE 1 TABLET BY MOUTH EVERY DAY 90 Tab 1    famotidine (PEPCID) 20 mg tablet TAKE 1 TABLET BY MOUTH TWICE A DAY (Patient taking differently: Take 20 mg by mouth two (2) times a day. TAKE 1 TABLET BY MOUTH TWICE A DAY  Indications: gastroesophageal reflux disease) 60 Tab 2    ALPRAZolam (XANAX) 0.5 mg tablet Take 0.5 mg by mouth nightly as needed. 1    busPIRone (BUSPAR) 5 mg tablet Take 1 Tab by mouth two (2) times a day.  180 Tab 1    multivitamin, tx-iron-ca-min (THERA-M W/ IRON) 9 mg iron-400 mcg tab tablet Take 1 Tab by mouth daily. Allergies   Allergen Reactions    Losartan Other (comments)     Shaking, feeling unbalanced. Past Medical History:   Diagnosis Date    Arrhythmia     Arthritis     knees    Diverticulitis     High cholesterol     Hyperlipidemia     Hypertension     Irregular heart beat     Tinnitus     Tinnitus 03/27/2019     Past Surgical History:   Procedure Laterality Date    COLONOSCOPY N/A 12/17/2018    COLONOSCOPY performed by Blaine Rivera MD at [facility] ENDOSCOPY     Family History   Problem Relation Age of Onset   Ricky Larios Hypertension Mother     Stroke Father     Diabetes Sister     Hypertension Sister      Social History     Tobacco Use    Smoking status: Never Smoker    Smokeless tobacco: Never Used   Substance Use Topics    Alcohol use:  Yes     Alcohol/week: 7.0 - 8.0 standard drinks     Types: 4 Shots of liquor, 3 - 4 Standard drinks or equivalent per week     Comment: socially        Lab Results   Component Value Date/Time    WBC 3.2 (L) 06/13/2019 10:30 AM    HGB 13.3 06/13/2019 10:30 AM    Hemoglobin (POC) 15.6 12/03/2017 10:40 AM    HCT 40.6 06/13/2019 10:30 AM    Hematocrit (POC) 46 12/03/2017 10:40 AM    PLATELET 245 62/70/6271 10:30 AM    MCV 95 06/13/2019 10:30 AM     Lab Results   Component Value Date/Time    Cholesterol, total 119 06/13/2019 10:30 AM    HDL Cholesterol 36 (L) 06/13/2019 10:30 AM    LDL, calculated 59 06/13/2019 10:30 AM    LDL-C, External 67 06/06/2018    Triglyceride 119 06/13/2019 10:30 AM     Lab Results   Component Value Date/Time    GFR est non-AA 61 06/13/2019 10:30 AM    GFRNA, POC 59 (L) 12/03/2017 10:40 AM    GFR est AA 71 06/13/2019 10:30 AM    GFRAA, POC >60 12/03/2017 10:40 AM    Creatinine 1.15 06/13/2019 10:30 AM    Creatinine (POC) 1.2 12/03/2017 10:40 AM    BUN 15 06/13/2019 10:30 AM    BUN (POC) 12 12/03/2017 10:40 AM    Sodium 142 06/13/2019 10:30 AM    Sodium (POC) 142 12/03/2017 10:40 AM    Potassium 4.3 06/13/2019 10:30 AM    Potassium (POC) 3.9 12/03/2017 10:40 AM    Chloride 104 06/13/2019 10:30 AM    Chloride (POC) 104 12/03/2017 10:40 AM    CO2 24 06/13/2019 10:30 AM    Magnesium 1.8 03/31/2018 07:33 AM        Review of Systems, additional:  Pertinent items are noted in HPI. Objective:     Visit Vitals  /61 (BP 1 Location: Left arm, BP Patient Position: Sitting)   Pulse 68   Temp 97.8 °F (36.6 °C) (Oral)   Resp 16   Ht 5' 11\" (1.803 m)   Wt 193 lb (87.5 kg)   SpO2 96%   BMI 26.92 kg/m²     Appearance: alert, well appearing, and in no distress. General exam: CVS exam BP noted to be well controlled today in office, S1, S2 normal, no gallop, no murmur, chest clear, no JVD, no HSM, no edema, peripheral vascular exam both carotids normal upstroke without bruits. Lab review: orders written for new lab studies as appropriate; see orders. Assessment/Plan:     hypertension borderline controlled. orders and follow up as documented in patient record. ICD-10-CM ICD-9-CM    1. Essential hypertension I10 401.9 CBC W/O DIFF      METABOLIC PANEL, COMPREHENSIVE      amLODIPine (NORVASC) 10 mg tablet      lisinopril (PRINIVIL, ZESTRIL) 20 mg tablet     Educated about continuing to only take 2 tabs of Lisinopril, as prescribed. Can increase Norvasc from 5 mg to 10 mg at bedtime. Will notify when labs return. Should return to office with continued elevated BP, or concerns. Pt informed to return to office with worsening of symptoms, or PRN with any questions or concerns. Pt verbalizes understanding of plan of care and denies further questions or concerns at this time.

## 2020-02-05 NOTE — PROGRESS NOTES
Please call patient and let him know that labs returned. Cr returned and is elevated. Should increase water intake.   Should return in 6 months for office visit and fasting labs  Thanks

## 2020-02-20 DIAGNOSIS — I10 ESSENTIAL HYPERTENSION: ICD-10-CM

## 2020-02-20 RX ORDER — METOPROLOL SUCCINATE 50 MG/1
TABLET, EXTENDED RELEASE ORAL
Qty: 30 TAB | Refills: 0 | Status: SHIPPED | OUTPATIENT
Start: 2020-02-20 | End: 2020-03-19

## 2020-02-27 ENCOUNTER — OFFICE VISIT (OUTPATIENT)
Dept: FAMILY MEDICINE CLINIC | Age: 80
End: 2020-02-27

## 2020-02-27 VITALS
SYSTOLIC BLOOD PRESSURE: 138 MMHG | HEIGHT: 71 IN | RESPIRATION RATE: 16 BRPM | WEIGHT: 201 LBS | OXYGEN SATURATION: 95 % | TEMPERATURE: 97.9 F | HEART RATE: 66 BPM | DIASTOLIC BLOOD PRESSURE: 75 MMHG | BODY MASS INDEX: 28.14 KG/M2

## 2020-02-27 DIAGNOSIS — M25.472 BILATERAL SWELLING OF FEET AND ANKLES: Primary | ICD-10-CM

## 2020-02-27 DIAGNOSIS — M25.471 BILATERAL SWELLING OF FEET AND ANKLES: Primary | ICD-10-CM

## 2020-02-27 DIAGNOSIS — M25.475 BILATERAL SWELLING OF FEET AND ANKLES: Primary | ICD-10-CM

## 2020-02-27 DIAGNOSIS — M25.474 BILATERAL SWELLING OF FEET AND ANKLES: Primary | ICD-10-CM

## 2020-02-27 NOTE — PROGRESS NOTES
Identified pt with two pt identifiers(name and ). Chief Complaint   Patient presents with    Ankle swelling     bilaterally - more in the left than the right - sx have been occurring over the past 3 days        Health Maintenance Due   Topic    DTaP/Tdap/Td series (1 - Tdap)    Shingrix Vaccine Age 50> (1 of 2)    GLAUCOMA SCREENING Q2Y        Wt Readings from Last 3 Encounters:   20 201 lb (91.2 kg)   20 193 lb (87.5 kg)   19 195 lb (88.5 kg)     Temp Readings from Last 3 Encounters:   20 97.9 °F (36.6 °C) (Oral)   20 97.8 °F (36.6 °C) (Oral)   19 98.2 °F (36.8 °C) (Oral)     BP Readings from Last 3 Encounters:   20 138/75   20 129/61   19 140/72     Pulse Readings from Last 3 Encounters:   20 66   20 68   19 72         Learning Assessment:  :     Learning Assessment 2018   PRIMARY LEARNER Patient   BARRIERS PRIMARY LEARNER VISUAL   CO-LEARNER CAREGIVER No   PRIMARY LANGUAGE ENGLISH   LEARNER PREFERENCE PRIMARY DEMONSTRATION     LISTENING     READING   ANSWERED BY patient   RELATIONSHIP SELF       Depression Screening:  :     3 most recent PHQ Screens 2019   Little interest or pleasure in doing things Not at all   Feeling down, depressed, irritable, or hopeless Not at all   Total Score PHQ 2 0       Fall Risk Assessment:  :     Fall Risk Assessment, last 12 mths 2019   Able to walk? Yes   Fall in past 12 months? No       Abuse Screening:  :     Abuse Screening Questionnaire 3/21/2019 2018   Do you ever feel afraid of your partner? N N   Are you in a relationship with someone who physically or mentally threatens you? N N   Is it safe for you to go home?  Y Y       Coordination of Care Questionnaire:  :     1) Have you been to an emergency room, urgent care clinic since your last visit? no   Hospitalized since your last visit? no             2) Have you seen or consulted any other health care providers outside of [facility] [address] [place] [address] since your last visit? no  (Include any pap smears or colon screenings in this section.)    3) Do you have an Advance Directive on file? no  Are you interested in receiving information about Advance Directives? no      Reviewed record in preparation for visit and have obtained necessary documentation. Medication reconciliation up to date and corrected with patient at this time.

## 2020-02-27 NOTE — PROGRESS NOTES
HISTORY OF PRESENT ILLNESS  Renee Dixon is a 70 y.o. male. HPI   Pt presents with \"ankle swelling\"    Pt states that he has noted some ankle swelling for about 3 days  Both ankles are swelling  No pain in either ankle or foot  No redness in area  OTC: none  Review of Systems   Constitutional: Negative for fever. HENT: Negative for congestion. Cardiovascular: Positive for leg swelling. Gastrointestinal: Negative for diarrhea and vomiting. Physical Exam  Constitutional:       Appearance: Normal appearance. HENT:      Head: Normocephalic and atraumatic. Neck:      Musculoskeletal: Normal range of motion and neck supple. Cardiovascular:      Rate and Rhythm: Normal rate and regular rhythm. Heart sounds: Normal heart sounds. Pulmonary:      Effort: Pulmonary effort is normal.      Breath sounds: Normal breath sounds. Musculoskeletal:        Feet:    Neurological:      Mental Status: He is alert. Psychiatric:         Mood and Affect: Mood normal.         Behavior: Behavior normal.         ASSESSMENT and PLAN    ICD-10-CM ICD-9-CM    1. Bilateral swelling of feet and ankles M25.473 719.07     M25.476       Educated that swelling is very minimal, and could be due to Norvasc. Educated about notifying office should swelling continue and/or worsen, should pain develop, etc.    Pt informed to return to office with worsening of symptoms, or PRN with any questions or concerns. Pt verbalizes understanding of plan of care and denies further questions or concerns at this time.

## 2020-02-27 NOTE — PATIENT INSTRUCTIONS
Leg and Ankle Edema: Care Instructions  Your Care Instructions  Swelling in the legs, ankles, and feet is called edema. It is common after you sit or stand for a while. Long plane flights or car rides often cause swelling in the legs and feet. You may also have swelling if you have to stand for long periods of time at your job. Problems with the veins in the legs (varicose veins) and changes in hormones can also cause swelling. Sometimes the swelling in the ankles and feet is caused by a more serious problem, such as heart failure, infection, blood clots, or liver or kidney disease. Follow-up care is a key part of your treatment and safety. Be sure to make and go to all appointments, and call your doctor if you are having problems. It's also a good idea to know your test results and keep a list of the medicines you take. How can you care for yourself at home? · If your doctor gave you medicine, take it as prescribed. Call your doctor if you think you are having a problem with your medicine. · Whenever you are resting, raise your legs up. Try to keep the swollen area higher than the level of your heart. · Take breaks from standing or sitting in one position. ? Walk around to increase the blood flow in your lower legs. ? Move your feet and ankles often while you stand, or tighten and relax your leg muscles. · Wear support stockings. Put them on in the morning, before swelling gets worse. · Eat a balanced diet. Lose weight if you need to. · Limit the amount of salt (sodium) in your diet. Salt holds fluid in the body and may increase swelling. When should you call for help? Call 911 anytime you think you may need emergency care. For example, call if:    · You have symptoms of a blood clot in your lung (called a pulmonary embolism). These may include:  ? Sudden chest pain. ? Trouble breathing. ?  Coughing up blood.    Call your doctor now or seek immediate medical care if:    · You have signs of a blood clot, such as:  ? Pain in your calf, back of the knee, thigh, or groin. ? Redness and swelling in your leg or groin.     · You have symptoms of infection, such as:  ? Increased pain, swelling, warmth, or redness. ? Red streaks or pus. ? A fever.    Watch closely for changes in your health, and be sure to contact your doctor if:    · Your swelling is getting worse.     · You have new or worsening pain in your legs.     · You do not get better as expected. Where can you learn more? Go to http://prabhjot-willie.info/. Enter E161 in the search box to learn more about \"Leg and Ankle Edema: Care Instructions. \"  Current as of: June 26, 2019  Content Version: 12.2  © 1094-5145 [employer]. Care instructions adapted under license by [employer] (which disclaims liability or warranty for this information). If you have questions about a medical condition or this instruction, always ask your healthcare professional. [address] any warranty or liability for your use of this information.

## 2020-03-01 DIAGNOSIS — F41.9 ANXIETY: ICD-10-CM

## 2020-03-01 RX ORDER — BUSPIRONE HYDROCHLORIDE 5 MG/1
TABLET ORAL
Qty: 180 TAB | Refills: 1 | Status: SHIPPED | OUTPATIENT
Start: 2020-03-01

## 2020-03-11 ENCOUNTER — HOSPITAL ENCOUNTER (OUTPATIENT)
Dept: LAB | Age: 80
Discharge: HOME | End: 2020-03-11

## 2020-03-11 ENCOUNTER — OFFICE VISIT (OUTPATIENT)
Dept: FAMILY MEDICINE CLINIC | Age: 80
End: 2020-03-11

## 2020-03-11 VITALS
RESPIRATION RATE: 18 BRPM | DIASTOLIC BLOOD PRESSURE: 74 MMHG | SYSTOLIC BLOOD PRESSURE: 158 MMHG | BODY MASS INDEX: 28.14 KG/M2 | HEIGHT: 71 IN | WEIGHT: 201 LBS | OXYGEN SATURATION: 98 % | TEMPERATURE: 98.1 F | HEART RATE: 62 BPM

## 2020-03-11 DIAGNOSIS — R22.42 LOCALIZED SWELLING OF LEFT FOOT: Primary | ICD-10-CM

## 2020-03-11 DIAGNOSIS — R22.42 LOCALIZED SWELLING OF LEFT FOOT: ICD-10-CM

## 2020-03-11 LAB
ALBUMIN SERPL-MCNC: 4.1 G/DL (ref 3.5–5)
ALBUMIN/GLOB SERPL: 1.2 {RATIO} (ref 1.1–2.2)
ALP SERPL-CCNC: 74 U/L (ref 45–117)
ALT SERPL-CCNC: 39 U/L (ref 12–78)
ANION GAP SERPL CALC-SCNC: 6 MMOL/L (ref 5–15)
AST SERPL-CCNC: 27 U/L (ref 15–37)
BILIRUB SERPL-MCNC: 0.7 MG/DL (ref 0.2–1)
BUN SERPL-MCNC: 20 MG/DL (ref 6–20)
BUN/CREAT SERPL: 15 (ref 12–20)
CALCIUM SERPL-MCNC: 9.1 MG/DL (ref 8.5–10.1)
CHLORIDE SERPL-SCNC: 108 MMOL/L (ref 97–108)
CO2 SERPL-SCNC: 27 MMOL/L (ref 21–32)
CREAT SERPL-MCNC: 1.37 MG/DL (ref 0.7–1.3)
ERYTHROCYTE [DISTWIDTH] IN BLOOD BY AUTOMATED COUNT: 12.1 % (ref 11.5–14.5)
GLOBULIN SER CALC-MCNC: 3.5 G/DL (ref 2–4)
GLUCOSE SERPL-MCNC: 83 MG/DL (ref 65–100)
HCT VFR BLD AUTO: 43.3 % (ref 36.6–50.3)
HGB BLD-MCNC: 13.8 G/DL (ref 12.1–17)
MCH RBC QN AUTO: 31.1 PG (ref 26–34)
MCHC RBC AUTO-ENTMCNC: 31.9 G/DL (ref 30–36.5)
MCV RBC AUTO: 97.5 FL (ref 80–99)
NRBC # BLD: 0 K/UL (ref 0–0.01)
NRBC BLD-RTO: 0 PER 100 WBC
PLATELET # BLD AUTO: 164 K/UL (ref 150–400)
PMV BLD AUTO: 11.2 FL (ref 8.9–12.9)
POTASSIUM SERPL-SCNC: 4.3 MMOL/L (ref 3.5–5.1)
PROT SERPL-MCNC: 7.6 G/DL (ref 6.4–8.2)
RBC # BLD AUTO: 4.44 M/UL (ref 4.1–5.7)
SODIUM SERPL-SCNC: 141 MMOL/L (ref 136–145)
URATE SERPL-MCNC: 7 MG/DL (ref 3.5–7.2)
WBC # BLD AUTO: 4.5 K/UL (ref 4.1–11.1)

## 2020-03-11 NOTE — PROGRESS NOTES
Identified pt with two pt identifiers(name and ). Chief Complaint   Patient presents with    Foot Swelling     left foot        Health Maintenance Due   Topic    DTaP/Tdap/Td series (1 - Tdap)    Shingrix Vaccine Age 50> (1 of 2)    GLAUCOMA SCREENING Q2Y        Wt Readings from Last 3 Encounters:   20 201 lb (91.2 kg)   20 201 lb (91.2 kg)   20 193 lb (87.5 kg)     Temp Readings from Last 3 Encounters:   20 98.1 °F (36.7 °C) (Oral)   20 97.9 °F (36.6 °C) (Oral)   20 97.8 °F (36.6 °C) (Oral)     BP Readings from Last 3 Encounters:   20 158/74   20 138/75   20 129/61     Pulse Readings from Last 3 Encounters:   20 62   20 66   20 68         Learning Assessment:  :     Learning Assessment 2018   PRIMARY LEARNER Patient   BARRIERS PRIMARY LEARNER VISUAL   CO-LEARNER CAREGIVER No   PRIMARY LANGUAGE ENGLISH   LEARNER PREFERENCE PRIMARY DEMONSTRATION     LISTENING     READING   ANSWERED BY patient   RELATIONSHIP SELF       Depression Screening:  :     3 most recent PHQ Screens 2019   Little interest or pleasure in doing things Not at all   Feeling down, depressed, irritable, or hopeless Not at all   Total Score PHQ 2 0       Fall Risk Assessment:  :     Fall Risk Assessment, last 12 mths 2019   Able to walk? Yes   Fall in past 12 months? No       Abuse Screening:  :     Abuse Screening Questionnaire 3/21/2019 2018   Do you ever feel afraid of your partner? N N   Are you in a relationship with someone who physically or mentally threatens you? N N   Is it safe for you to go home?  Y Y         Coordination of Care Questionnaire:  :     1) Have you been to an emergency room, urgent care clinic since your last visit? no   Hospitalized since your last visit? no             2) Have you seen or consulted any other health care providers outside of [address] [place] [address] since your last visit? no  (Include any pap smears or colon screenings in this section.)    3) Do you have an Advance Directive on file? no  Are you interested in receiving information about Advance Directives? no    Reviewed record in preparation for visit and have obtained necessary documentation. Medication reconciliation up to date and corrected with patient at this time.

## 2020-03-11 NOTE — PROGRESS NOTES
HISTORY OF PRESENT ILLNESS  Gris Reyna is a 70 y.o. male. HPI  Pt presents with \"edema\"    Pt states that his left foot has continued to swell since being seen on 2.27  Swelling is noted daily, but is worse as the day goes on  No pain in foot  No erythema or warmth  Normal ROM of foot  He was seen by cardiology in November, and was told to not follow up again for a year. Review of Systems   Constitutional: Negative for fever. HENT: Negative for congestion. Gastrointestinal: Negative for diarrhea and vomiting. Physical Exam  Constitutional:       Appearance: Normal appearance. HENT:      Head: Normocephalic and atraumatic. Neck:      Musculoskeletal: Normal range of motion and neck supple. Cardiovascular:      Rate and Rhythm: Normal rate and regular rhythm. Heart sounds: Normal heart sounds. Pulmonary:      Effort: Pulmonary effort is normal.      Breath sounds: Normal breath sounds. Musculoskeletal:        Feet:    Neurological:      Mental Status: He is alert. Psychiatric:         Mood and Affect: Mood normal.         Behavior: Behavior normal.         ASSESSMENT and PLAN    ICD-10-CM ICD-9-CM    1. Localized swelling of left foot R22.42 782.2 CBC W/O DIFF      METABOLIC PANEL, COMPREHENSIVE      URIC ACID     Will notify when labs return, and inform him of any change in plan of care at that time    Pt informed to return to office with worsening of symptoms, or PRN with any questions or concerns. Pt verbalizes understanding of plan of care and denies further questions or concerns at this time.

## 2020-03-12 NOTE — PROGRESS NOTES
Please call patient and let him know that his labs returned. Cr is slightly elevated. Will continue to monitor and should be re-checked in 3 months. Otherwise stable.   Should follow up with cardiology, to ensure that this is not cardiac issue with BP, etc.  Thanks

## 2020-03-16 ENCOUNTER — TELEPHONE (OUTPATIENT)
Dept: FAMILY MEDICINE CLINIC | Age: 80
End: 2020-03-16

## 2020-03-17 NOTE — PROGRESS NOTES
Pt has not return call to the office. Advised via letter with results/recommendations, which was mailed to pt's address on file.

## 2020-03-19 ENCOUNTER — OFFICE VISIT (OUTPATIENT)
Dept: CARDIOLOGY CLINIC | Age: 80
End: 2020-03-19

## 2020-03-19 VITALS
HEART RATE: 75 BPM | DIASTOLIC BLOOD PRESSURE: 70 MMHG | BODY MASS INDEX: 27.86 KG/M2 | SYSTOLIC BLOOD PRESSURE: 130 MMHG | WEIGHT: 199 LBS | OXYGEN SATURATION: 95 % | HEIGHT: 71 IN

## 2020-03-19 DIAGNOSIS — I49.1 PREMATURE ATRIAL CONTRACTIONS: Primary | ICD-10-CM

## 2020-03-19 DIAGNOSIS — E78.00 PURE HYPERCHOLESTEROLEMIA: ICD-10-CM

## 2020-03-19 DIAGNOSIS — I10 ESSENTIAL HYPERTENSION: ICD-10-CM

## 2020-03-19 DIAGNOSIS — R00.2 PALPITATIONS: ICD-10-CM

## 2020-03-19 RX ORDER — ATORVASTATIN CALCIUM 10 MG/1
TABLET, FILM COATED ORAL
Qty: 90 TAB | Refills: 1 | Status: SHIPPED | OUTPATIENT
Start: 2020-03-19 | End: 2020-09-16

## 2020-03-19 RX ORDER — METOPROLOL SUCCINATE 50 MG/1
TABLET, EXTENDED RELEASE ORAL
Qty: 30 TAB | Refills: 0 | Status: SHIPPED | OUTPATIENT
Start: 2020-03-19 | End: 2020-04-11

## 2020-03-19 NOTE — PROGRESS NOTES
Office Follow-up    NAME: Cathie Bishop   :  1940  MRM:  [id number]    Date:  3/19/2020            Assessment:     Problem List  Date Reviewed: 2020          Codes Class Noted    Anxiety ICD-10-CM: F41.9  ICD-9-CM: 300.00  2018        Tinnitus of left ear ICD-10-CM: H93.12  ICD-9-CM: 388.30  2018        Essential hypertension ICD-10-CM: I10  ICD-9-CM: 401.9  2018        Pure hypercholesterolemia ICD-10-CM: E78.00  ICD-9-CM: 272.0  2018                 Plan:     1. Hypertension: Blood pressure better controlled but has evening rise in blood pressure up to 073 systolic. I asked him to move his bedtime lisinopril 10 mg which he takes to 10:00 in the morning. His future blood pressure regimen will look like this metoprolol 7 AM, lisinopril 10 AM, another dose of lisinopril 7 PM and amlodipine at bedtime. Watch sodium intake. Increase mobility. 2. Left ankle swelling. This is likely ankle or metatarsal joint related. Asked him to follow-up with primary care physician Dr. Nuvia Maldonado again get an x-ray. 3. Dyslipidemia: Continue statins. 4. Palpitations: He has known history of PACs. These are controlled with metoprolol. He had recently seen Ana Marsh. No new recommendations. 5. Follow-up with me in 1 year. Call us on phone if blood pressure continues to be elevated and we can monitor and recommend on phone. ATTENTION:   This medical record was transcribed using an electronic medical records/speech recognition system. Although proofread, it may and can contain electronic, spelling and other errors. Corrections may be executed at a later time. Please feel free to contact us for any clarifications as needed. Subjective:     Cathie Bishop, a 70y.o. year-old who presents for followup. I had last seen him in 2018 and since then he has been following with Dr. Slmi Gamez.   When I had first seen him he had palpitations and we did Holter monitor which demonstrated PACs. He also has background history of hypertension and dyslipidemia. Lately his blood pressure was elevated therefore primary care physician added amlodipine and lisinopril. He has been taking these medications and blood pressure is better controlled however at evening times his blood pressure sometimes goes up to 124 systolic. Otherwise he has no symptoms of chest pain, shortness of breath, lightheadedness or dizziness. He has left ankle swelling. Exam:     Physical Exam:  Visit Vitals  /70 (BP 1 Location: Left arm, BP Patient Position: Sitting)   Pulse 75   Ht 5' 11\" (1.803 m)   Wt 199 lb (90.3 kg)   SpO2 95%   BMI 27.75 kg/m²     General appearance - alert, well appearing, and in no distress  Mental status - affect appropriate to mood  Eyes - sclera anicteric, moist mucous membranes  Neck - supple, no significant adenopathy  Chest - clear to auscultation, no wheezes, rales or rhonchi  Heart - normal rate, regular rhythm, normal S1, S2, no murmurs, rubs, clicks or gallops  Abdomen - soft, nontender, nondistended, no masses or organomegaly  Extremities - peripheral pulses normal, no pedal edema  Skin - normal coloration  no rashes    Medications:     Current Outpatient Medications   Medication Sig    metoprolol succinate (TOPROL-XL) 50 mg XL tablet TAKE 1 TABLET BY MOUTH EVERY DAY    atorvastatin (LIPITOR) 10 mg tablet TAKE 1 TABLET BY MOUTH EVERY DAY    busPIRone (BUSPAR) 5 mg tablet TAKE 1 TABLET BY MOUTH TWICE A DAY    amLODIPine (NORVASC) 10 mg tablet Take 1 Tab by mouth daily.  lisinopril (PRINIVIL, ZESTRIL) 20 mg tablet TAKING 2 TABLETS PO ONCE DAILY    diclofenac EC (VOLTAREN) 50 mg EC tablet TAKE 1 TAB BY MOUTH TWO (2) TIMES DAILY AS NEEDED.  famotidine (PEPCID) 20 mg tablet TAKE 1 TABLET BY MOUTH TWICE A DAY (Patient taking differently: Take 20 mg by mouth two (2) times a day.  TAKE 1 TABLET BY MOUTH TWICE A DAY  Indications: gastroesophageal reflux disease)    ALPRAZolam (XANAX) 0.5 mg tablet Take 0.5 mg by mouth nightly as needed.  multivitamin, tx-iron-ca-min (THERA-M W/ IRON) 9 mg iron-400 mcg tab tablet Take 1 Tab by mouth daily. No current facility-administered medications for this visit. Diagnostic Data Review:       No specialty comments available. Lab Review:     Lab Results   Component Value Date/Time    Cholesterol, total 119 06/13/2019 10:30 AM    HDL Cholesterol 36 (L) 06/13/2019 10:30 AM    LDL, calculated 59 06/13/2019 10:30 AM    Triglyceride 119 06/13/2019 10:30 AM     Lab Results   Component Value Date/Time    Creatinine (POC) 1.2 12/03/2017 10:40 AM    Creatinine 1.37 (H) 03/11/2020 01:28 PM     Lab Results   Component Value Date/Time    BUN 20 03/11/2020 01:28 PM    BUN (POC) 12 12/03/2017 10:40 AM     Lab Results   Component Value Date/Time    Potassium 4.3 03/11/2020 01:28 PM     Lab Results   Component Value Date/Time    Hemoglobin A1c, External 5.0 06/06/2018     Lab Results   Component Value Date/Time    Hemoglobin (POC) 15.6 12/03/2017 10:40 AM    HGB 13.8 03/11/2020 01:28 PM     Lab Results   Component Value Date/Time    PLATELET 733 51/71/2438 01:28 PM     No results for input(s): CPK, CKMB, TROIQ in the last 72 hours. No lab exists for component: CKQMB, CPKMB             ___________________________________________________    Jay Chaudhari.  Javed Horan MD, [facility]

## 2020-03-19 NOTE — PROGRESS NOTES
Suha Tolentino is a 70 y.o. male    Chief Complaint   Patient presents with    Other     BP issues       Chest pain No    SOB No    Dizziness No    Swelling patient states swelling in his left ankle    Refills Metroprolol    Visit Vitals  /70 (BP 1 Location: Left arm, BP Patient Position: Sitting)   Pulse 75   Ht 5' 11\" (1.803 m)   Wt 199 lb (90.3 kg)   SpO2 95%   BMI 27.75 kg/m²       1. Have you been to the ER, urgent care clinic since your last visit? Hospitalized since your last visit? No    2. Have you seen or consulted any other health care providers outside of the [address] [place] [address] since your last visit? Include any pap smears or colon screening.   No

## 2020-03-19 NOTE — PATIENT INSTRUCTIONS
Follow-up with me in 1 year. Call us with high BP readings. Take BP medications in following times: metoprolol 7 AM, lisinopril 10 AM, another dose of lisinopril 7 PM and amlodipine at bedtime.

## 2020-03-31 ENCOUNTER — HOSPITAL ENCOUNTER (OUTPATIENT)
Dept: GENERAL RADIOLOGY | Age: 80
Discharge: HOME | End: 2020-03-31
Attending: NURSE PRACTITIONER
Payer: MEDICARE

## 2020-03-31 ENCOUNTER — TELEPHONE (OUTPATIENT)
Dept: FAMILY MEDICINE CLINIC | Age: 80
End: 2020-03-31

## 2020-03-31 DIAGNOSIS — M25.572 ACUTE LEFT ANKLE PAIN: ICD-10-CM

## 2020-03-31 DIAGNOSIS — M25.572 ACUTE LEFT ANKLE PAIN: Primary | ICD-10-CM

## 2020-03-31 PROCEDURE — 73610 X-RAY EXAM OF ANKLE: CPT

## 2020-03-31 NOTE — TELEPHONE ENCOUNTER
Pt called stating that he saw his cardiologist on 03/19/2020 regarding his swollen ankle and was advised the swelling had nothing to do with his heart. He reports that his cardiologist did change his medications around and advised him to f/u and have xray done. He would like xray done on left ankle to r/o pain and swelling and has advised per Vicky TINAJERO that order would be sent to [facility] for him to have this done. He verbalized understanding.

## 2020-03-31 NOTE — PROGRESS NOTES
Please call patient and let him know that x-ray returned and is normal.  Should pain continue, should be seen by ortho  Thanks

## 2020-04-11 DIAGNOSIS — I10 ESSENTIAL HYPERTENSION: ICD-10-CM

## 2020-04-11 RX ORDER — METOPROLOL SUCCINATE 50 MG/1
TABLET, EXTENDED RELEASE ORAL
Qty: 30 TAB | Refills: 0 | Status: SHIPPED | OUTPATIENT
Start: 2020-04-11 | End: 2020-05-07

## 2020-04-13 DIAGNOSIS — I10 ESSENTIAL HYPERTENSION: ICD-10-CM

## 2020-04-14 RX ORDER — AMLODIPINE BESYLATE 10 MG/1
TABLET ORAL
Qty: 90 TAB | Refills: 0 | Status: SHIPPED | OUTPATIENT
Start: 2020-04-14 | End: 2020-06-29

## 2020-04-30 DIAGNOSIS — I10 ESSENTIAL HYPERTENSION: ICD-10-CM

## 2020-04-30 RX ORDER — FAMOTIDINE 20 MG/1
20 TABLET, FILM COATED ORAL 2 TIMES DAILY
Qty: 180 TAB | Refills: 0 | Status: SHIPPED | OUTPATIENT
Start: 2020-04-30 | End: 2020-07-24

## 2020-04-30 RX ORDER — LISINOPRIL 20 MG/1
TABLET ORAL
Qty: 180 TAB | Refills: 1 | Status: SHIPPED | OUTPATIENT
Start: 2020-04-30 | End: 2020-07-27

## 2020-04-30 NOTE — TELEPHONE ENCOUNTER
----- Message from [place] [employer] sent at 4/30/2020 11:55 AM EDT -----  Regarding: Gian Durbin - TANVI  Medication Refill    Caller (if not patient):      Relationship of caller (if not patient):      Best contact number(s):[phone]      Name of medication and dosage if known: lisinopril (PRINIVIL, ZESTRIL) 20 mg tablet, famotidine (PEPCID) 20 mg tablet       Is patient out of this medication (yes/no): Yes      Pharmacy name:[facility]/pharmacy [id number] listed in chart? (yes/no):  Pharmacy phone number:      Details to clarify the request: pt has questions regarding [facility] ([facility] location reflecting as closed.       Emanuel Concepcion

## 2020-05-01 RX ORDER — LISINOPRIL 40 MG/1
20 TABLET ORAL 2 TIMES DAILY
Qty: 90 TAB | Refills: 1 | Status: SHIPPED | OUTPATIENT
Start: 2020-05-01 | End: 2020-07-30

## 2020-05-01 NOTE — TELEPHONE ENCOUNTER
[facility] stated that Lisinopril 20 mg tablet is on backorder but 40 mg tablet is available and can it be switched from 2 20mg tabs once a day  to 1 40mg tab

## 2020-05-07 DIAGNOSIS — I10 ESSENTIAL HYPERTENSION: ICD-10-CM

## 2020-05-07 RX ORDER — METOPROLOL SUCCINATE 50 MG/1
TABLET, EXTENDED RELEASE ORAL
Qty: 30 TAB | Refills: 0 | Status: SHIPPED | OUTPATIENT
Start: 2020-05-07 | End: 2020-06-01

## 2020-05-31 DIAGNOSIS — I10 ESSENTIAL HYPERTENSION: ICD-10-CM

## 2020-06-01 RX ORDER — METOPROLOL SUCCINATE 50 MG/1
TABLET, EXTENDED RELEASE ORAL
Qty: 30 TAB | Refills: 0 | Status: SHIPPED | OUTPATIENT
Start: 2020-06-01 | End: 2020-06-25

## 2020-06-25 DIAGNOSIS — I10 ESSENTIAL HYPERTENSION: ICD-10-CM

## 2020-06-25 RX ORDER — METOPROLOL SUCCINATE 50 MG/1
TABLET, EXTENDED RELEASE ORAL
Qty: 30 TAB | Refills: 0 | Status: SHIPPED | OUTPATIENT
Start: 2020-06-25 | End: 2020-07-06 | Stop reason: SDUPTHER

## 2020-06-27 DIAGNOSIS — I10 ESSENTIAL HYPERTENSION: ICD-10-CM

## 2020-06-29 RX ORDER — AMLODIPINE BESYLATE 10 MG/1
TABLET ORAL
Qty: 90 TAB | Refills: 0 | Status: SHIPPED | OUTPATIENT
Start: 2020-06-29 | End: 2020-08-31

## 2020-07-06 DIAGNOSIS — I10 ESSENTIAL HYPERTENSION: ICD-10-CM

## 2020-07-06 RX ORDER — METOPROLOL SUCCINATE 50 MG/1
TABLET, EXTENDED RELEASE ORAL
Qty: 90 TAB | Refills: 1 | Status: SHIPPED | OUTPATIENT
Start: 2020-07-06 | End: 2021-01-17

## 2020-07-24 RX ORDER — FAMOTIDINE 20 MG/1
TABLET, FILM COATED ORAL
Qty: 180 TAB | Refills: 0 | Status: SHIPPED | OUTPATIENT
Start: 2020-07-24

## 2020-07-27 DIAGNOSIS — I10 ESSENTIAL HYPERTENSION: ICD-10-CM

## 2020-07-27 RX ORDER — LISINOPRIL 20 MG/1
TABLET ORAL
Qty: 180 TAB | Refills: 1 | Status: SHIPPED | OUTPATIENT
Start: 2020-07-27 | End: 2021-05-26

## 2020-08-22 DIAGNOSIS — G89.29 CHRONIC PAIN OF BOTH SHOULDERS: ICD-10-CM

## 2020-08-22 DIAGNOSIS — M25.511 CHRONIC PAIN OF BOTH SHOULDERS: ICD-10-CM

## 2020-08-22 DIAGNOSIS — M25.512 CHRONIC PAIN OF BOTH SHOULDERS: ICD-10-CM

## 2020-08-22 RX ORDER — DICLOFENAC SODIUM 50 MG/1
50 TABLET, DELAYED RELEASE ORAL
Qty: 180 TAB | Refills: 0 | Status: SHIPPED | OUTPATIENT
Start: 2020-08-22 | End: 2020-09-16

## 2020-08-31 DIAGNOSIS — I10 ESSENTIAL HYPERTENSION: ICD-10-CM

## 2020-08-31 RX ORDER — AMLODIPINE BESYLATE 10 MG/1
TABLET ORAL
Qty: 90 TAB | Refills: 0 | Status: SHIPPED | OUTPATIENT
Start: 2020-08-31 | End: 2020-11-30

## 2020-09-02 ENCOUNTER — TELEPHONE (OUTPATIENT)
Dept: FAMILY MEDICINE CLINIC | Age: 80
End: 2020-09-02

## 2020-09-02 NOTE — TELEPHONE ENCOUNTER
----- Message from Mallytrell Quinn sent at 9/2/2020  1:24 PM EDT -----  Regarding: Dr. Merino Maillard: [phone]  Caller's first and last name: Darlene Hoang   Reason for call: Requested a call back   Callback required yes/no and why: yes   Best contact number(s): [phone]  Details to clarify the request: Pt would like a call back to discuss his concern of he was a temperature of 97.1.

## 2020-09-08 ENCOUNTER — OFFICE VISIT (OUTPATIENT)
Dept: FAMILY MEDICINE CLINIC | Age: 80
End: 2020-09-08
Payer: MEDICARE

## 2020-09-08 VITALS
DIASTOLIC BLOOD PRESSURE: 74 MMHG | HEIGHT: 71 IN | SYSTOLIC BLOOD PRESSURE: 132 MMHG | BODY MASS INDEX: 26.85 KG/M2 | RESPIRATION RATE: 20 BRPM | OXYGEN SATURATION: 96 % | HEART RATE: 75 BPM | WEIGHT: 191.8 LBS | TEMPERATURE: 98.5 F

## 2020-09-08 DIAGNOSIS — Z12.11 SCREENING FOR COLON CANCER: ICD-10-CM

## 2020-09-08 DIAGNOSIS — E55.9 VITAMIN D DEFICIENCY: ICD-10-CM

## 2020-09-08 DIAGNOSIS — E78.00 PURE HYPERCHOLESTEROLEMIA: ICD-10-CM

## 2020-09-08 DIAGNOSIS — Z13.0 SCREENING FOR DEFICIENCY ANEMIA: ICD-10-CM

## 2020-09-08 DIAGNOSIS — Z00.00 MEDICARE ANNUAL WELLNESS VISIT, SUBSEQUENT: Primary | ICD-10-CM

## 2020-09-08 DIAGNOSIS — I10 ESSENTIAL HYPERTENSION: ICD-10-CM

## 2020-09-08 PROBLEM — R35.0 INCREASED FREQUENCY OF URINATION: Status: ACTIVE | Noted: 2020-09-08

## 2020-09-08 PROBLEM — R07.9 CHEST PAIN: Status: ACTIVE | Noted: 2020-09-08

## 2020-09-08 PROBLEM — R94.31 NONSPECIFIC ST-T WAVE ELECTROCARDIOGRAPHIC CHANGES: Status: ACTIVE | Noted: 2020-09-08

## 2020-09-08 PROBLEM — R00.2 PALPITATIONS: Status: ACTIVE | Noted: 2020-09-08

## 2020-09-08 PROBLEM — K52.9 COLITIS: Status: ACTIVE | Noted: 2020-09-08

## 2020-09-08 PROCEDURE — G0439 PPPS, SUBSEQ VISIT: HCPCS | Performed by: INTERNAL MEDICINE

## 2020-09-08 PROCEDURE — G0444 DEPRESSION SCREEN ANNUAL: HCPCS | Performed by: INTERNAL MEDICINE

## 2020-09-08 RX ORDER — ASPIRIN 81 MG/1
81 TABLET ORAL DAILY
COMMUNITY

## 2020-09-08 NOTE — PATIENT INSTRUCTIONS
Well Visit, Over 72: Care Instructions Your Care Instructions Physical exams can help you stay healthy. Your doctor has checked your overall health and may have suggested ways to take good care of yourself. He or she also may have recommended tests. At home, you can help prevent illness with healthy eating, regular exercise, and other steps. Follow-up care is a key part of your treatment and safety. Be sure to make and go to all appointments, and call your doctor if you are having problems. It's also a good idea to know your test results and keep a list of the medicines you take. How can you care for yourself at home? · Reach and stay at a healthy weight. This will lower your risk for many problems, such as obesity, diabetes, heart disease, and high blood pressure. · Get at least 30 minutes of exercise on most days of the week. Walking is a good choice. You also may want to do other activities, such as running, swimming, cycling, or playing tennis or team sports. · Do not smoke. Smoking can make health problems worse. If you need help quitting, talk to your doctor about stop-smoking programs and medicines. These can increase your chances of quitting for good. · Protect your skin from too much sun. When you're outdoors from 10 a.m. to 4 p.m., stay in the shade or cover up with clothing and a hat with a wide brim. Wear sunglasses that block UV rays. Even when it's cloudy, put broad-spectrum sunscreen (SPF 30 or higher) on any exposed skin. · See a dentist one or two times a year for checkups and to have your teeth cleaned. · Wear a seat belt in the car. Follow your doctor's advice about when to have certain tests. These tests can spot problems early. For men and women · Cholesterol. Your doctor will tell you how often to have this done based on your overall health and other things that can increase your risk for heart attack and stroke. · Blood pressure. Have your blood pressure checked during a routine doctor visit. Your doctor will tell you how often to check your blood pressure based on your age, your blood pressure results, and other factors. · Diabetes. Ask your doctor whether you should have tests for diabetes. · Vision. Experts recommend that you have yearly exams for glaucoma and other age-related eye problems. · Hearing. Tell your doctor if you notice any change in your hearing. You can have tests to find out how well you hear. · Colon cancer tests. Keep having colon cancer tests as your doctor recommends. You can have one of several types of tests. · Heart attack and stroke risk. At least every 4 to 6 years, you should have your risk for heart attack and stroke assessed. Your doctor uses factors such as your age, blood pressure, cholesterol, and whether you smoke or have diabetes to show what your risk for a heart attack or stroke is over the next 10 years. · Osteoporosis. Talk to your doctor about whether you should have a bone density test to find out whether you have thinning bones. Ask your doctor if you need to take a calcium plus vitamin D supplement. You may be able to get enough calcium and vitamin D through your diet. For women · Pap test and pelvic exam. You may no longer need a Pap test. Talk with your doctor about whether to stop or continue to have Pap tests. · Breast exam and mammogram. Ask how often you should have a mammogram, which is an X-ray of your breasts. A mammogram can spot breast cancer before it can be felt and when it is easiest to treat. · Thyroid disease. Talk to your doctor about whether to have your thyroid checked as part of a regular physical exam. Women have an increased chance of a thyroid problem. For men · Prostate exam. Talk to your doctor about whether you should have a blood test (called a PSA test) for prostate cancer.  Experts recommend that you discuss the benefits and risks of the test with your doctor before you decide whether to have this test. Some experts say that men ages 79 and older no longer need testing. · Abdominal aortic aneurysm. Ask your doctor whether you should have a test to check for an aneurysm. You may need a test if you ever smoked or if your parent, brother, sister, or child has had an aneurysm. When should you call for help? Watch closely for changes in your health, and be sure to contact your doctor if you have any problems or symptoms that concern you. Where can you learn more? Go to http://www.gray.com/ Enter V762 in the search box to learn more about \"Well Visit, Over 65: Care Instructions. \" Current as of: May 27, 2020               Content Version: 12.6 © 4273-5919 [employer]. Care instructions adapted under license by [employer] (which disclaims liability or warranty for this information). If you have questions about a medical condition or this instruction, always ask your healthcare professional. [address] any warranty or liability for your use of this information.

## 2020-09-08 NOTE — PROGRESS NOTES
CC:  Chief Complaint   Patient presents with    Annual Wellness Visit       This is the Subsequent Medicare Annual Wellness Exam, performed 12 months or more after the Initial AWV or the last Subsequent AWV    I have reviewed the patient's medical history in detail and updated the computerized patient record. History   79M who presents today for AWV. He is doing well. No recent ER or hospital visit. He continues to follow with his urologist. Recently started on Flomax for BPH. Has not taken it yet. Was concerned about whether it would adversely affect his BP. Discussed that it could bring the BP down a little, but mostly it is used to improve urinary flow through an enlarged prostate. Also, he will return for fasting labs on a different day. Problem List:  Patient Active Problem List   Diagnosis Code    Tinnitus of left ear H93.12    Essential hypertension I10    Pure hypercholesterolemia E78.00    Anxiety F41.9       Past Medical History:  Past Medical History:   Diagnosis Date    Arrhythmia     Arthritis     knees    Diverticulitis     High cholesterol     Hyperlipidemia     Hypertension     Irregular heart beat     Tinnitus     Tinnitus 03/27/2019       Past Surgical History:  Past Surgical History:   Procedure Laterality Date    COLONOSCOPY N/A 12/17/2018    COLONOSCOPY performed by Murali Flores MD at [facility]       Medications:  Current Outpatient Medications   Medication Sig Dispense Refill    aspirin (ASPIRIN) 325 mg tablet Take 325 mg by mouth daily.  amLODIPine (NORVASC) 10 mg tablet TAKE 1 TABLET BY MOUTH EVERY DAY 90 Tab 0    diclofenac EC (VOLTAREN) 50 mg EC tablet TAKE 1 TAB BY MOUTH TWO (2) TIMES DAILY AS NEEDED. 180 Tab 0    lisinopriL (PRINIVIL, ZESTRIL) 20 mg tablet TAKE 2 TABLETS DAILY (Patient taking differently: 20 mg two (2) times a day.  TAKE 2 TABLETS DAILY) 180 Tab 1    famotidine (PEPCID) 20 mg tablet TAKE 1 TABLET BY MOUTH TWICE A  Tab 0    metoprolol succinate (TOPROL-XL) 50 mg XL tablet TAKE 1 TABLET BY MOUTH EVERY DAY 90 Tab 1    atorvastatin (LIPITOR) 10 mg tablet TAKE 1 TABLET BY MOUTH EVERY DAY 90 Tab 1    busPIRone (BUSPAR) 5 mg tablet TAKE 1 TABLET BY MOUTH TWICE A  Tab 1    multivitamin, tx-iron-ca-min (THERA-M W/ IRON) 9 mg iron-400 mcg tab tablet Take 1 Tab by mouth daily.  ALPRAZolam (XANAX) 0.5 mg tablet Take 0.5 mg by mouth nightly as needed. 1       Allergies: Allergies   Allergen Reactions    Losartan Other (comments)     Shaking, feeling unbalanced. Family History:  Family History   Problem Relation Age of Onset    Hypertension Mother     Stroke Father     Diabetes Sister     Hypertension Sister      Social History     Tobacco Use    Smoking status: Never Smoker    Smokeless tobacco: Never Used   Substance Use Topics    Alcohol use: Yes     Alcohol/week: 7.0 - 8.0 standard drinks     Types: 4 Shots of liquor, 3 - 4 Standard drinks or equivalent per week     Comment: socially       Depression Risk Factor Screening:     3 most recent PHQ Screens 3/19/2020   Little interest or pleasure in doing things Not at all   Feeling down, depressed, irritable, or hopeless Not at all   Total Score PHQ 2 0       Alcohol Risk Factor Screening (MALE > 65): Do you average more 1 drink per night or more than 7 drinks a week: No    In the past three months have you have had more than 4 drinks containing alcohol on one occasion: No      Functional Ability and Level of Safety:   Hearing: Hearing is good. Patient has chronic ringing in the L-ear. Activities of Daily Living: The home contains: handrails and grab bars  Patient does total self care     Ambulation: with no difficulty     Fall Risk:  Fall Risk Assessment, last 12 mths 9/8/2020   Able to walk? Yes   Fall in past 12 months?  No     Abuse Screen:  Patient is not abused       Cognitive Screening   Has your family/caregiver stated any concerns about your memory: no     Cognitive Screening: Normal - Clock Drawing Test, Mini Cog Test    Visit Vitals  /74 (BP 1 Location: Right arm, BP Patient Position: Sitting)   Pulse 75   Temp 98.5 °F (36.9 °C) (Oral)   Resp 20   Ht 5' 11\" (1.803 m)   Wt 191 lb 12.8 oz (87 kg)   SpO2 96%   BMI 26.75 kg/m²     General: alert, cooperative, no distress   Mental  status: normal mood, behavior, speech, dress, motor activity, and thought processes, able to follow commands   HENT: NCAT   Neck: no visualized mass   Resp: no respiratory distress   Neuro: no gross deficits   Skin: no discoloration or lesions of concern on visible areas   Psychiatric: normal affect, consistent with stated mood, no evidence of hallucinations       Patient Care Team   Patient Care Team:  Lou Sinclair MD as PCP - General (Pediatric Medicine)  Lou Sinclair MD as PCP - [facility]  Nawaf Dutton MD (Family Medicine)    Assessment/Plan   Education and counseling provided:  Are appropriate based on today's review and evaluation  End-of-Life planning (with patient's consent)  Pneumococcal Vaccine  Prostate cancer screening tests (PSA, covered annually)  Colorectal cancer screening tests  Cardiovascular screening blood test  Screening for glaucoma  Diabetes screening test    Diagnoses and all orders for this visit:    1. Medicare annual wellness visit, subsequent   . sjemd    2. Essential hypertension   Stable well controlled    3. Screening for colon cancer  -     OCCULT BLOOD IMMUNOASSAY,DIAGNOSTIC; Future    4. Screening for deficiency anemia  -     CBC WITH AUTOMATED DIFF; Future    5. Pure hypercholesterolemia  -     METABOLIC PANEL, COMPREHENSIVE; Future  -     LIPID PANEL; Future    6. Vitamin D deficiency  -     VITAMIN D, 25 HYDROXY; Future    Health Maintenance   Topic Date Due    DTaP/Tdap/Td series (1 - Tdap) Discussed. Hold for now.      GLAUCOMA SCREENING Q2Y  Patient will make the appointment    Lipid Screen  Ordered    Shingrix Vaccine Age 49> (1 of 2) Refused    Medicare Yearly Exam  09/09/2021    Flu Vaccine  Completed    Pneumococcal 65+ years  Completed       I have discussed the diagnosis with the patient and the intended treatment plan as seen in the above orders. The patient has received an after-visit summary and questions were answered concerning future plans. Asked to return should symptoms worsen or not improve with treatment. Any pending labs and studies will be relayed to patient when they become available. Pt verbalizes understanding of plan of care and denies further questions or concerns at this time. Follow-up and Dispositions    · Return in about 1 year (around 9/8/2021), or if symptoms worsen or fail to improve, for Follow up 6 months for chronic issues. Vin Lyons MD       Patient Instructions        Well Visit, Over 72: Care Instructions  Your Care Instructions     Physical exams can help you stay healthy. Your doctor has checked your overall health and may have suggested ways to take good care of yourself. He or she also may have recommended tests. At home, you can help prevent illness with healthy eating, regular exercise, and other steps. Follow-up care is a key part of your treatment and safety. Be sure to make and go to all appointments, and call your doctor if you are having problems. It's also a good idea to know your test results and keep a list of the medicines you take. How can you care for yourself at home? · Reach and stay at a healthy weight. This will lower your risk for many problems, such as obesity, diabetes, heart disease, and high blood pressure. · Get at least 30 minutes of exercise on most days of the week. Walking is a good choice. You also may want to do other activities, such as running, swimming, cycling, or playing tennis or team sports. · Do not smoke. Smoking can make health problems worse.  If you need help quitting, talk to your doctor about stop-smoking programs and medicines. These can increase your chances of quitting for good. · Protect your skin from too much sun. When you're outdoors from 10 a.m. to 4 p.m., stay in the shade or cover up with clothing and a hat with a wide brim. Wear sunglasses that block UV rays. Even when it's cloudy, put broad-spectrum sunscreen (SPF 30 or higher) on any exposed skin. · See a dentist one or two times a year for checkups and to have your teeth cleaned. · Wear a seat belt in the car. Follow your doctor's advice about when to have certain tests. These tests can spot problems early. For men and women  · Cholesterol. Your doctor will tell you how often to have this done based on your overall health and other things that can increase your risk for heart attack and stroke. · Blood pressure. Have your blood pressure checked during a routine doctor visit. Your doctor will tell you how often to check your blood pressure based on your age, your blood pressure results, and other factors. · Diabetes. Ask your doctor whether you should have tests for diabetes. · Vision. Experts recommend that you have yearly exams for glaucoma and other age-related eye problems. · Hearing. Tell your doctor if you notice any change in your hearing. You can have tests to find out how well you hear. · Colon cancer tests. Keep having colon cancer tests as your doctor recommends. You can have one of several types of tests. · Heart attack and stroke risk. At least every 4 to 6 years, you should have your risk for heart attack and stroke assessed. Your doctor uses factors such as your age, blood pressure, cholesterol, and whether you smoke or have diabetes to show what your risk for a heart attack or stroke is over the next 10 years. · Osteoporosis. Talk to your doctor about whether you should have a bone density test to find out whether you have thinning bones. Ask your doctor if you need to take a calcium plus vitamin D supplement.  You may be able to get enough calcium and vitamin D through your diet. For women  · Pap test and pelvic exam. You may no longer need a Pap test. Talk with your doctor about whether to stop or continue to have Pap tests. · Breast exam and mammogram. Ask how often you should have a mammogram, which is an X-ray of your breasts. A mammogram can spot breast cancer before it can be felt and when it is easiest to treat. · Thyroid disease. Talk to your doctor about whether to have your thyroid checked as part of a regular physical exam. Women have an increased chance of a thyroid problem. For men  · Prostate exam. Talk to your doctor about whether you should have a blood test (called a PSA test) for prostate cancer. Experts recommend that you discuss the benefits and risks of the test with your doctor before you decide whether to have this test. Some experts say that men ages 79 and older no longer need testing. · Abdominal aortic aneurysm. Ask your doctor whether you should have a test to check for an aneurysm. You may need a test if you ever smoked or if your parent, brother, sister, or child has had an aneurysm. When should you call for help? Watch closely for changes in your health, and be sure to contact your doctor if you have any problems or symptoms that concern you. Where can you learn more? Go to http://prabhjot-willie.info/  Enter [id number] in the search box to learn more about \"Well Visit, Over 65: Care Instructions. \"  Current as of: May 27, 2020               Content Version: 12.6  © 5070-0478 [employer]. Care instructions adapted under license by [facility] (which disclaims liability or warranty for this information). If you have questions about a medical condition or this instruction, always ask your healthcare professional. [place] [address] any warranty or liability for your use of this information.

## 2020-09-08 NOTE — PROGRESS NOTES
Identified pt with two pt identifiers(name and ). Chief Complaint   Patient presents with   [address] Maintenance Due   Topic    DTaP/Tdap/Td series (1 - Tdap)    Shingrix Vaccine Age 49> (1 of 2)    GLAUCOMA SCREENING Q2Y     Medicare Yearly Exam     Lipid Screen     Flu Vaccine (1)       Wt Readings from Last 3 Encounters:   20 191 lb 12.8 oz (87 kg)   20 199 lb (90.3 kg)   20 201 lb (91.2 kg)     Temp Readings from Last 3 Encounters:   20 98.5 °F (36.9 °C) (Oral)   20 98.1 °F (36.7 °C) (Oral)   20 97.9 °F (36.6 °C) (Oral)     BP Readings from Last 3 Encounters:   20 132/74   20 130/70   20 158/74     Pulse Readings from Last 3 Encounters:   20 75   20 75   20 62         Learning Assessment:  :     Learning Assessment 2018   PRIMARY LEARNER Patient   BARRIERS PRIMARY LEARNER VISUAL   CO-LEARNER CAREGIVER No   PRIMARY LANGUAGE ENGLISH   LEARNER PREFERENCE PRIMARY DEMONSTRATION     LISTENING     READING   ANSWERED BY patient   RELATIONSHIP SELF       Depression Screening:  :     3 most recent PHQ Screens 3/19/2020   Little interest or pleasure in doing things Not at all   Feeling down, depressed, irritable, or hopeless Not at all   Total Score PHQ 2 0       Fall Risk Assessment:  :     Fall Risk Assessment, last 12 mths 2020   Able to walk? Yes   Fall in past 12 months? No       Abuse Screening:  :     Abuse Screening Questionnaire 2020 3/21/2019 2018   Do you ever feel afraid of your partner? N N N   Are you in a relationship with someone who physically or mentally threatens you? N N N   Is it safe for you to go home?  Y Y Y       Coordination of Care Questionnaire:  :     1) Have you been to an emergency room, urgent care clinic since your last visit? no   Hospitalized since your last visit? no             2) Have you seen or consulted any other health care providers outside of [facility] [address] since your last visit? yes Dr Jannie Fowler 7/2020     3) Do you have an Advance Directive on file?  no  Are you interested in receiving information about Advance Directives? no        .

## 2020-09-16 DIAGNOSIS — M25.511 CHRONIC PAIN OF BOTH SHOULDERS: ICD-10-CM

## 2020-09-16 DIAGNOSIS — G89.29 CHRONIC PAIN OF BOTH SHOULDERS: ICD-10-CM

## 2020-09-16 DIAGNOSIS — M25.512 CHRONIC PAIN OF BOTH SHOULDERS: ICD-10-CM

## 2020-09-16 RX ORDER — ATORVASTATIN CALCIUM 10 MG/1
TABLET, FILM COATED ORAL
Qty: 90 TAB | Refills: 1 | Status: SHIPPED | OUTPATIENT
Start: 2020-09-16 | End: 2021-03-19

## 2020-09-16 RX ORDER — DICLOFENAC SODIUM 50 MG/1
50 TABLET, DELAYED RELEASE ORAL
Qty: 180 TAB | Refills: 0 | Status: SHIPPED | OUTPATIENT
Start: 2020-09-16

## 2020-09-21 ENCOUNTER — TELEPHONE (OUTPATIENT)
Dept: FAMILY MEDICINE CLINIC | Age: 80
End: 2020-09-21

## 2020-09-21 NOTE — TELEPHONE ENCOUNTER
----- Message from Claudeky Whelan sent at 9/21/2020 10:58 AM EDT -----  Regarding: Dr Dominique Figueroa first and last name and relationship (if not the patient): self  Best contact number(s): [phone]  What are the symptoms: severe lower back pain, right hip pain when walking  Transfer successful - yes/no (include outcome): no, no answer  Transfer declined - yes/no (include reason): N/A  Was caller advised to seek appropriate level of care - yes/no: N/A   Details to clarify the request: pt would like recommendation on where to go for x-rays for severe lower back pain and pain in right hip when walking - attempted to transfer, no answer

## 2020-10-28 ENCOUNTER — OFFICE VISIT (OUTPATIENT)
Dept: FAMILY MEDICINE CLINIC | Age: 80
End: 2020-10-28
Payer: MEDICARE

## 2020-10-28 VITALS
HEIGHT: 71 IN | HEART RATE: 77 BPM | BODY MASS INDEX: 25.9 KG/M2 | WEIGHT: 185 LBS | DIASTOLIC BLOOD PRESSURE: 62 MMHG | OXYGEN SATURATION: 96 % | SYSTOLIC BLOOD PRESSURE: 116 MMHG | TEMPERATURE: 99.4 F | RESPIRATION RATE: 20 BRPM

## 2020-10-28 DIAGNOSIS — R73.09 ELEVATED GLUCOSE: Primary | ICD-10-CM

## 2020-10-28 DIAGNOSIS — I10 ESSENTIAL HYPERTENSION: ICD-10-CM

## 2020-10-28 DIAGNOSIS — E55.9 VITAMIN D DEFICIENCY: ICD-10-CM

## 2020-10-28 DIAGNOSIS — Z13.0 SCREENING FOR DEFICIENCY ANEMIA: ICD-10-CM

## 2020-10-28 DIAGNOSIS — E78.00 PURE HYPERCHOLESTEROLEMIA: ICD-10-CM

## 2020-10-28 PROCEDURE — 99213 OFFICE O/P EST LOW 20 MIN: CPT | Performed by: INTERNAL MEDICINE

## 2020-10-28 RX ORDER — ASPIRIN 81 MG/1
TABLET ORAL DAILY
COMMUNITY
End: 2021-01-07 | Stop reason: SDUPTHER

## 2020-10-28 NOTE — PROGRESS NOTES
Chief Complaint   Patient presents with    Blood sugar problem     seems to be fluctuating       Subjective:   Barbi Peraza is a 70 y.o. male who presents with concerns that his blood sugars are fluctuating. He has been checking them in the morning and they are 101 and sometimes after he eats they are 180. He does not have a h/o diabetes but \"I am very worried about this. \"  He denies polydipsia, polyphagia, polyuria or change in weight. The patient has a h/o HTN and HLD. No other worrisome features at this time. We discussed checking an A1C to get a read of what the blood sugars have been doing over the past 3-months. He will return in the morning fasting for the labs. Patient Active Problem List    Diagnosis Date Noted    Chest pain 09/08/2020    Colitis 09/08/2020    Increased frequency of urination 09/08/2020    Nonspecific ST-T wave electrocardiographic changes 09/08/2020    Palpitations 09/08/2020    Anxiety 09/05/2018    Tinnitus of left ear 01/30/2018    Essential hypertension 01/30/2018    Pure hypercholesterolemia 01/30/2018         Current Outpatient Medications   Medication Sig Dispense Refill    aspirin delayed-release 81 mg tablet Take  by mouth daily.  atorvastatin (LIPITOR) 10 mg tablet TAKE 1 TABLET BY MOUTH EVERY DAY 90 Tab 1    diclofenac EC (VOLTAREN) 50 mg EC tablet TAKE 1 TAB BY MOUTH TWO (2) TIMES DAILY AS NEEDED. 180 Tab 0    amLODIPine (NORVASC) 10 mg tablet TAKE 1 TABLET BY MOUTH EVERY DAY 90 Tab 0    lisinopriL (PRINIVIL, ZESTRIL) 20 mg tablet TAKE 2 TABLETS DAILY (Patient taking differently: 20 mg two (2) times a day.  TAKE 2 TABLETS DAILY) 180 Tab 1    famotidine (PEPCID) 20 mg tablet TAKE 1 TABLET BY MOUTH TWICE A  Tab 0    metoprolol succinate (TOPROL-XL) 50 mg XL tablet TAKE 1 TABLET BY MOUTH EVERY DAY 90 Tab 1    busPIRone (BUSPAR) 5 mg tablet TAKE 1 TABLET BY MOUTH TWICE A  Tab 1    ALPRAZolam (XANAX) 0.5 mg tablet Take 0.5 mg by mouth nightly as needed. 1    multivitamin, tx-iron-ca-min (THERA-M W/ IRON) 9 mg iron-400 mcg tab tablet Take 1 Tab by mouth daily.  aspirin (ASPIRIN) 325 mg tablet Take 325 mg by mouth daily. Allergies   Allergen Reactions    Losartan Other (comments)     Shaking, feeling unbalanced. Past Medical History:   Diagnosis Date    Arrhythmia     Arthritis     knees    Diverticulitis     High cholesterol     Hyperlipidemia     Hypertension     Irregular heart beat     Tinnitus     Tinnitus 03/27/2019         Past Surgical History:   Procedure Laterality Date    COLONOSCOPY N/A 12/17/2018    COLONOSCOPY performed by Mecca Marin MD at [facility]         Family History   Problem Relation Age of Onset   [facility] Hypertension Mother     Stroke Father     Diabetes Sister     Hypertension Sister          Social History     Tobacco Use    Smoking status: Never Smoker    Smokeless tobacco: Never Used   Substance Use Topics    Alcohol use: Yes     Alcohol/week: 7.0 - 8.0 standard drinks     Types: 4 Shots of liquor, 3 - 4 Standard drinks or equivalent per week     Comment: socially          Review of Systems  Pertinent items are noted in HPI. Objective:     Vitals:    10/28/20 1527   BP: 116/62   Pulse: 77   Resp: 20   Temp: 99.4 °F (37.4 °C)   TempSrc: Oral   SpO2: 96%   Weight: 185 lb (83.9 kg)   Height: 5' 11\" (1.803 m)       General: alert, cooperative, no distress   Mental  status: normal mood, behavior, speech, dress, motor activity, and thought processes, able to follow commands   HENT: NCAT   Neck: no visualized mass   Resp: no respiratory distress   Neuro: no gross deficits   Skin: no discoloration or lesions of concern on visible areas   Psychiatric: normal affect, consistent with stated mood, no evidence of hallucinations           Assessment/ Plan:   Diagnoses and all orders for this visit:    1. Elevated glucose  -     HEMOGLOBIN A1C WITH EAG; Future    2.  Essential hypertension  -     METABOLIC PANEL, COMPREHENSIVE; Future    3. Pure hypercholesterolemia  -     METABOLIC PANEL, COMPREHENSIVE; Future  -     LIPID PANEL; Future    4. Vitamin D deficiency  -     VITAMIN D, 25 HYDROXY; Future    5. Screening for deficiency anemia  -     CBC WITH AUTOMATED DIFF; Future    I have discussed the diagnosis with the patient and the intended treatment plan as seen in the above orders. The patient has received an after-visit summary and questions were answered concerning future plans. Asked to return should symptoms worsen or not improve with treatment. Any pending labs and studies will be relayed to patient when they become available. Pt verbalizes understanding of plan of care and denies further questions or concerns at this time. Follow-up and Dispositions    · Return if symptoms worsen or fail to improve. Wily Ocampo MD      Patient Instructions        Learning About High Blood Sugar  What is high blood sugar? Your body turns the food you eat into glucose (sugar), which it uses for energy. But if your body isn't able to use the sugar right away, it can build up in your blood and lead to high blood sugar. When the amount of sugar in your blood stays too high for too much of the time, you may have diabetes. Diabetes is a disease that can cause serious health problems. The good news is that lifestyle changes may help you get your blood sugar back to normal and avoid or delay diabetes. What causes high blood sugar? Sugar (glucose) can build up in your blood if you:  · Are overweight. · Have a family history of diabetes. · Take certain medicines, such as steroids. What are the symptoms? Having high blood sugar may not cause any symptoms at all. Or it may make you feel very thirsty or very hungry. You may also urinate more often than usual, have blurry vision, or lose weight without trying. How is high blood sugar treated?   You can take steps to lower your blood sugar level if you understand what makes it get higher. Your doctor may want you to learn how to test your blood sugar level at home. Then you can see how illness, stress, or different kinds of food or medicine raise or lower your blood sugar level. Other tests may be needed to see if you have diabetes. How can you prevent high blood sugar? · Watch your weight. If you're overweight, losing just a small amount of weight may help. Reducing fat around your waist is most important. · Limit the amount of calories, sweets, and unhealthy fat you eat. Ask your doctor if a dietitian can help you. A registered dietitian can help you create meal plans that fit your lifestyle. · Get at least 30 minutes of exercise on most days of the week. Exercise helps control your blood sugar. It also helps you maintain a healthy weight. Walking is a good choice. You also may want to do other activities, such as running, swimming, cycling, or playing tennis or team sports. · If your doctor prescribed medicines, take them exactly as prescribed. Call your doctor if you think you are having a problem with your medicine. You will get more details on the specific medicines your doctor prescribes. Follow-up care is a key part of your treatment and safety. Be sure to make and go to all appointments, and call your doctor if you are having problems. It's also a good idea to know your test results and keep a list of the medicines you take. Where can you learn more? Go to http://www.gray.com/  Enter O108 in the search box to learn more about \"Learning About High Blood Sugar. \"  Current as of: December 20, 2019               Content Version: 12.6  © 8947-8233 [employer]. Care instructions adapted under license by [employer] (which disclaims liability or warranty for this information).  If you have questions about a medical condition or this instruction, always ask your healthcare professional. [address] any warranty or liability for your use of this information.

## 2020-10-28 NOTE — PATIENT INSTRUCTIONS
Learning About High Blood Sugar What is high blood sugar? Your body turns the food you eat into glucose (sugar), which it uses for energy. But if your body isn't able to use the sugar right away, it can build up in your blood and lead to high blood sugar. When the amount of sugar in your blood stays too high for too much of the time, you may have diabetes. Diabetes is a disease that can cause serious health problems. The good news is that lifestyle changes may help you get your blood sugar back to normal and avoid or delay diabetes. What causes high blood sugar? Sugar (glucose) can build up in your blood if you: · Are overweight. · Have a family history of diabetes. · Take certain medicines, such as steroids. What are the symptoms? Having high blood sugar may not cause any symptoms at all. Or it may make you feel very thirsty or very hungry. You may also urinate more often than usual, have blurry vision, or lose weight without trying. How is high blood sugar treated? You can take steps to lower your blood sugar level if you understand what makes it get higher. Your doctor may want you to learn how to test your blood sugar level at home. Then you can see how illness, stress, or different kinds of food or medicine raise or lower your blood sugar level. Other tests may be needed to see if you have diabetes. How can you prevent high blood sugar? · Watch your weight. If you're overweight, losing just a small amount of weight may help. Reducing fat around your waist is most important. · Limit the amount of calories, sweets, and unhealthy fat you eat. Ask your doctor if a dietitian can help you. A registered dietitian can help you create meal plans that fit your lifestyle. · Get at least 30 minutes of exercise on most days of the week. Exercise helps control your blood sugar. It also helps you maintain a healthy weight. Walking is a good choice.  You also may want to do other activities, such as running, swimming, cycling, or playing tennis or team sports. · If your doctor prescribed medicines, take them exactly as prescribed. Call your doctor if you think you are having a problem with your medicine. You will get more details on the specific medicines your doctor prescribes. Follow-up care is a key part of your treatment and safety. Be sure to make and go to all appointments, and call your doctor if you are having problems. It's also a good idea to know your test results and keep a list of the medicines you take. Where can you learn more? Go to http://www.gray.com/ Enter O108 in the search box to learn more about \"Learning About High Blood Sugar. \" Current as of: December 20, 2019               Content Version: 12.6 © 8803-0047 [employer]. Care instructions adapted under license by [employer] (which disclaims liability or warranty for this information). If you have questions about a medical condition or this instruction, always ask your healthcare professional. [address] any warranty or liability for your use of this information.

## 2020-10-28 NOTE — PROGRESS NOTES
Identified pt with two pt identifiers(name and ). Chief Complaint   Patient presents with    Blood sugar problem     seems to be fluctuating        Health Maintenance Due   Topic    DTaP/Tdap/Td series (1 - Tdap)    GLAUCOMA SCREENING Q2Y     Lipid Screen        Wt Readings from Last 3 Encounters:   10/28/20 185 lb (83.9 kg)   20 191 lb 12.8 oz (87 kg)   20 199 lb (90.3 kg)     Temp Readings from Last 3 Encounters:   10/28/20 99.4 °F (37.4 °C) (Oral)   20 98.5 °F (36.9 °C) (Oral)   20 98.1 °F (36.7 °C) (Oral)     BP Readings from Last 3 Encounters:   10/28/20 116/62   20 132/74   20 130/70     Pulse Readings from Last 3 Encounters:   10/28/20 77   20 75   20 75         Learning Assessment:  :     Learning Assessment 2018   PRIMARY LEARNER Patient   BARRIERS PRIMARY LEARNER VISUAL   CO-LEARNER CAREGIVER No   PRIMARY LANGUAGE ENGLISH   LEARNER PREFERENCE PRIMARY DEMONSTRATION     LISTENING     READING   ANSWERED BY patient   RELATIONSHIP SELF       Depression Screening:  :     3 most recent PHQ Screens 3/19/2020   Little interest or pleasure in doing things Not at all   Feeling down, depressed, irritable, or hopeless Not at all   Total Score PHQ 2 0       Fall Risk Assessment:  :     Fall Risk Assessment, last 12 mths 2020   Able to walk? Yes   Fall in past 12 months? No       Abuse Screening:  :     Abuse Screening Questionnaire 2020 3/21/2019 2018   Do you ever feel afraid of your partner? N N N   Are you in a relationship with someone who physically or mentally threatens you? N N N   Is it safe for you to go home?  Doyne Halo       Coordination of Care Questionnaire:  :     1) Have you been to an emergency room, urgent care clinic since your last visit? no   Hospitalized since your last visit? no             2) Have you seen or consulted any other health care providers outside of [address] [place] [address] since your last visit? no  (Include any pap smears or colon screenings in this section.)    3) Do you have an Advance Directive on file? no  Are you interested in receiving information about Advance Directives? no    Reviewed record in preparation for visit and have obtained necessary documentation.

## 2020-10-29 ENCOUNTER — LAB ONLY (OUTPATIENT)
Dept: FAMILY MEDICINE CLINIC | Age: 80
End: 2020-10-29

## 2020-10-29 DIAGNOSIS — I10 ESSENTIAL HYPERTENSION: ICD-10-CM

## 2020-10-29 DIAGNOSIS — Z13.0 SCREENING FOR DEFICIENCY ANEMIA: ICD-10-CM

## 2020-10-29 DIAGNOSIS — E55.9 VITAMIN D DEFICIENCY: ICD-10-CM

## 2020-10-29 DIAGNOSIS — R73.09 ELEVATED GLUCOSE: ICD-10-CM

## 2020-10-29 DIAGNOSIS — E78.00 PURE HYPERCHOLESTEROLEMIA: ICD-10-CM

## 2020-10-29 LAB
25(OH)D3 SERPL-MCNC: 47.4 NG/ML (ref 30–100)
ALBUMIN SERPL-MCNC: 3.9 G/DL (ref 3.5–5)
ALBUMIN/GLOB SERPL: 1.3 {RATIO} (ref 1.1–2.2)
ALP SERPL-CCNC: 59 U/L (ref 45–117)
ALT SERPL-CCNC: 32 U/L (ref 12–78)
ANION GAP SERPL CALC-SCNC: 7 MMOL/L (ref 5–15)
AST SERPL-CCNC: 22 U/L (ref 15–37)
BASOPHILS # BLD: 0 K/UL (ref 0–0.1)
BASOPHILS NFR BLD: 1 % (ref 0–1)
BILIRUB SERPL-MCNC: 0.7 MG/DL (ref 0.2–1)
BUN SERPL-MCNC: 18 MG/DL (ref 6–20)
BUN/CREAT SERPL: 15 (ref 12–20)
CALCIUM SERPL-MCNC: 9.7 MG/DL (ref 8.5–10.1)
CHLORIDE SERPL-SCNC: 107 MMOL/L (ref 97–108)
CHOLEST SERPL-MCNC: 115 MG/DL
CO2 SERPL-SCNC: 26 MMOL/L (ref 21–32)
CREAT SERPL-MCNC: 1.24 MG/DL (ref 0.7–1.3)
DIFFERENTIAL METHOD BLD: ABNORMAL
EOSINOPHIL # BLD: 0.2 K/UL (ref 0–0.4)
EOSINOPHIL NFR BLD: 4 % (ref 0–7)
ERYTHROCYTE [DISTWIDTH] IN BLOOD BY AUTOMATED COUNT: 12.3 % (ref 11.5–14.5)
EST. AVERAGE GLUCOSE BLD GHB EST-MCNC: 100 MG/DL
GLOBULIN SER CALC-MCNC: 2.9 G/DL (ref 2–4)
GLUCOSE SERPL-MCNC: 98 MG/DL (ref 65–100)
HBA1C MFR BLD: 5.1 % (ref 4–5.6)
HCT VFR BLD AUTO: 39.4 % (ref 36.6–50.3)
HDLC SERPL-MCNC: 45 MG/DL
HDLC SERPL: 2.6 {RATIO} (ref 0–5)
HGB BLD-MCNC: 12.5 G/DL (ref 12.1–17)
IMM GRANULOCYTES # BLD AUTO: 0 K/UL (ref 0–0.04)
IMM GRANULOCYTES NFR BLD AUTO: 0 % (ref 0–0.5)
LDLC SERPL CALC-MCNC: 45.6 MG/DL (ref 0–100)
LIPID PROFILE,FLP: NORMAL
LYMPHOCYTES # BLD: 1.6 K/UL (ref 0.8–3.5)
LYMPHOCYTES NFR BLD: 34 % (ref 12–49)
MCH RBC QN AUTO: 31.6 PG (ref 26–34)
MCHC RBC AUTO-ENTMCNC: 31.7 G/DL (ref 30–36.5)
MCV RBC AUTO: 99.5 FL (ref 80–99)
MONOCYTES # BLD: 0.5 K/UL (ref 0–1)
MONOCYTES NFR BLD: 11 % (ref 5–13)
NEUTS SEG # BLD: 2.2 K/UL (ref 1.8–8)
NEUTS SEG NFR BLD: 50 % (ref 32–75)
NRBC # BLD: 0 K/UL (ref 0–0.01)
NRBC BLD-RTO: 0 PER 100 WBC
PLATELET # BLD AUTO: 189 K/UL (ref 150–400)
PMV BLD AUTO: 10.5 FL (ref 8.9–12.9)
POTASSIUM SERPL-SCNC: 4.6 MMOL/L (ref 3.5–5.1)
PROT SERPL-MCNC: 6.8 G/DL (ref 6.4–8.2)
RBC # BLD AUTO: 3.96 M/UL (ref 4.1–5.7)
SODIUM SERPL-SCNC: 140 MMOL/L (ref 136–145)
TRIGL SERPL-MCNC: 122 MG/DL (ref ?–150)
VLDLC SERPL CALC-MCNC: 24.4 MG/DL
WBC # BLD AUTO: 4.6 K/UL (ref 4.1–11.1)

## 2020-11-28 DIAGNOSIS — I10 ESSENTIAL HYPERTENSION: ICD-10-CM

## 2020-11-30 RX ORDER — AMLODIPINE BESYLATE 10 MG/1
TABLET ORAL
Qty: 90 TAB | Refills: 0 | Status: SHIPPED | OUTPATIENT
Start: 2020-11-30 | End: 2021-02-13

## 2020-12-18 ENCOUNTER — TELEPHONE (OUTPATIENT)
Dept: CARDIOLOGY CLINIC | Age: 80
End: 2020-12-18

## 2020-12-18 NOTE — TELEPHONE ENCOUNTER
Called patient, ID verified using two patient identifiers. Appointment rescheduled from 12/21/20 to Wednesday 12/23/20 @ 10 am with Dr. Maryanne Holguin. Patient verbalizes understanding of date, time and location of appointment.

## 2020-12-22 NOTE — PROGRESS NOTES
HISTORY OF PRESENTING ILLNESS      Zoie Macdonald is a 70 y.o. male with history of hypertension, dyslipidemia, and hx of PACs/PVCs on previous EKGs. He underwent a 24-hour Holter monitor which demonstrated PACs which were nonconducted at times resulting in compensatory pauses. He is currently treated with metoprolol 50 mg and reports that his palpitations have resolved. He was last seen one year ago. Doing well on metoprolol without issues. Continues to deny palpitations, irregular beats. PAST MEDICAL HISTORY     Past Medical History:   Diagnosis Date    Arrhythmia     Arthritis     knees    Diverticulitis     High cholesterol     Hyperlipidemia     Hypertension     Irregular heart beat     Tinnitus     Tinnitus 2019           PAST SURGICAL HISTORY     Past Surgical History:   Procedure Laterality Date    COLONOSCOPY N/A 2018    COLONOSCOPY performed by Zen Barr MD at [facility]          ALLERGIES     Allergies   Allergen Reactions    Losartan Other (comments)     Shaking, feeling unbalanced. FAMILY HISTORY     Family History   Problem Relation Age of Onset    Hypertension Mother    [facility] Stroke Father     Diabetes Sister     Hypertension Sister     negative for cardiac disease       SOCIAL HISTORY     Social History     Socioeconomic History    Marital status: SINGLE     Spouse name: Not on file    Number of children: Not on file    Years of education: Not on file    Highest education level: Not on file   Tobacco Use    Smoking status: Never Smoker    Smokeless tobacco: Never Used   Substance and Sexual Activity    Alcohol use:  Yes     Alcohol/week: 7.0 - 8.0 standard drinks     Types: 4 Shots of liquor, 3 - 4 Standard drinks or equivalent per week     Comment: socially    Drug use: Never    Sexual activity: Never   Social History Narrative    ** Merged History Encounter **              MEDICATIONS     Current Outpatient Medications Medication Sig    amLODIPine (NORVASC) 10 mg tablet TAKE 1 TABLET BY MOUTH EVERY DAY    aspirin delayed-release 81 mg tablet Take  by mouth daily.  atorvastatin (LIPITOR) 10 mg tablet TAKE 1 TABLET BY MOUTH EVERY DAY    diclofenac EC (VOLTAREN) 50 mg EC tablet TAKE 1 TAB BY MOUTH TWO (2) TIMES DAILY AS NEEDED.  lisinopriL (PRINIVIL, ZESTRIL) 20 mg tablet TAKE 2 TABLETS DAILY (Patient taking differently: 20 mg two (2) times a day. TAKE 2 TABLETS DAILY)    famotidine (PEPCID) 20 mg tablet TAKE 1 TABLET BY MOUTH TWICE A DAY    metoprolol succinate (TOPROL-XL) 50 mg XL tablet TAKE 1 TABLET BY MOUTH EVERY DAY    busPIRone (BUSPAR) 5 mg tablet TAKE 1 TABLET BY MOUTH TWICE A DAY (Patient taking differently: as needed.)    ALPRAZolam (XANAX) 0.5 mg tablet Take 0.5 mg by mouth nightly as needed.  multivitamin, tx-iron-ca-min (THERA-M W/ IRON) 9 mg iron-400 mcg tab tablet Take 1 Tab by mouth daily.  aspirin (ASPIRIN) 325 mg tablet Take 325 mg by mouth daily. No current facility-administered medications for this visit. I have reviewed the nurses notes, vitals, problem list, allergy list, medical history, family, social history and medications. REVIEW OF SYMPTOMS      General: Pt denies excessive weight gain or loss. Pt is able to conduct ADL's  HEENT: Denies blurred vision, headaches, hearing loss, epistaxis and difficulty swallowing. Respiratory: Denies cough, congestion, shortness of breath, ALANIS, wheezing or stridor.   Cardiovascular: Denies precordial pain, palpitations, edema or PND  Gastrointestinal: Denies poor appetite, indigestion, abdominal pain or blood in stool  Genitourinary: Denies hematuria, dysuria, increased urinary frequency  Musculoskeletal: Denies joint pain or swelling from muscles or joints  Neurologic: Denies tremor, paresthesias, headache, or sensory motor disturbance  Psychiatric: Denies confusion, insomnia, depression  Integumentray: Denies rash, itching or ulcers. Hematologic: Denies easy bruising, bleeding       PHYSICAL EXAMINATION      Vitals: see vitals section  General: Well developed, in no acute distress. HEENT: No jaundice, oral mucosa moist, no oral ulcers  Neck: Supple, no stiffness, no lymphadenopathy, supple  Heart:  Normal S1/S2 negative S3 or S4. Regular, no murmur, gallop or rub, no jugular venous distention  Respiratory: Clear bilaterally x 4, no wheezing or rales  Abdomen:   Soft, non-tender, bowel sounds are active. Extremities:  No edema, normal cap refill, no cyanosis. Musculoskeletal: No clubbing, no deformities  Neuro: A&Ox3, speech clear, gait stable, cooperative, no focal neurologic deficits  Skin: Skin color is normal. No rashes or lesions. Non diaphoretic, moist.  Vascular: 2+ pulses symmetric in all extremities       DIAGNOSTIC DATA      EKG:        LABORATORY DATA      Lab Results   Component Value Date/Time    WBC 4.6 10/29/2020 09:31 AM    Hemoglobin (POC) 15.6 12/03/2017 10:40 AM    HGB 12.5 10/29/2020 09:31 AM    Hematocrit (POC) 46 12/03/2017 10:40 AM    HCT 39.4 10/29/2020 09:31 AM    PLATELET 410 82/07/6120 09:31 AM    MCV 99.5 (H) 10/29/2020 09:31 AM      Lab Results   Component Value Date/Time    Sodium 140 10/29/2020 09:31 AM    Potassium 4.6 10/29/2020 09:31 AM    Chloride 107 10/29/2020 09:31 AM    CO2 26 10/29/2020 09:31 AM    Anion gap 7 10/29/2020 09:31 AM    Glucose 98 10/29/2020 09:31 AM    BUN 18 10/29/2020 09:31 AM    Creatinine 1.24 10/29/2020 09:31 AM    BUN/Creatinine ratio 15 10/29/2020 09:31 AM    GFR est AA >60 10/29/2020 09:31 AM    GFR est non-AA 57 (L) 10/29/2020 09:31 AM    Calcium 9.7 10/29/2020 09:31 AM    Bilirubin, total 0.7 10/29/2020 09:31 AM    Alk. phosphatase 59 10/29/2020 09:31 AM    Protein, total 6.8 10/29/2020 09:31 AM    Albumin 3.9 10/29/2020 09:31 AM    Globulin 2.9 10/29/2020 09:31 AM    A-G Ratio 1.3 10/29/2020 09:31 AM    ALT (SGPT) 32 10/29/2020 09:31 AM           ASSESSMENT      1. Premature atrial contractions              A. Palpitations              B. Nonconducted at times with compensatory pause  2. Hypertension  3. Dyslipidemia  4. PVCs       PLAN     Continue current drug regimen. ICD-10-CM ICD-9-CM    1. Premature atrial contractions  I49.1 427.61 AMB POC EKG ROUTINE W/ 12 LEADS, INTER & REP   2. Paroxysmal atrial fibrillation (HCC)  I48.0 427.31      No orders of the defined types were placed in this encounter. FOLLOW-UP     1 year      Thank you, Alexa Berumen MD for allowing me to participate in the care of this extraordinarily pleasant male. Please do not hesitate to contact me for further questions/concerns.          Dionne Rosales MD  Cardiac Electrophysiology / Cardiology    [address] [place] [address]  [phone] / [phone] Fax   [phone] / [phone] Fax

## 2020-12-23 ENCOUNTER — OFFICE VISIT (OUTPATIENT)
Dept: CARDIOLOGY CLINIC | Age: 80
End: 2020-12-23
Payer: MEDICARE

## 2020-12-23 VITALS
BODY MASS INDEX: 26.32 KG/M2 | HEIGHT: 71 IN | WEIGHT: 188 LBS | DIASTOLIC BLOOD PRESSURE: 68 MMHG | SYSTOLIC BLOOD PRESSURE: 132 MMHG | HEART RATE: 68 BPM | OXYGEN SATURATION: 98 %

## 2020-12-23 DIAGNOSIS — I48.0 PAROXYSMAL ATRIAL FIBRILLATION (HCC): ICD-10-CM

## 2020-12-23 DIAGNOSIS — I49.1 PREMATURE ATRIAL CONTRACTIONS: Primary | ICD-10-CM

## 2020-12-23 PROCEDURE — 93000 ELECTROCARDIOGRAM COMPLETE: CPT | Performed by: INTERNAL MEDICINE

## 2020-12-23 PROCEDURE — 99215 OFFICE O/P EST HI 40 MIN: CPT | Performed by: INTERNAL MEDICINE

## 2020-12-23 NOTE — PROGRESS NOTES
Room 5    Visit Vitals  /68   Pulse 68   Ht 5' 11\" (1.803 m)   Wt 188 lb (85.3 kg)   SpO2 98%   BMI 26.22 kg/m²       Doing good     Chest pain: no  Shortness of breath: no  Edema: no  Palpitations, Skipped beats, Rapid heartbeat: no  Dizziness: no    New diagnosis/Surgeries: no    ER/Hospitalizations: no    Refills: no

## 2021-01-07 ENCOUNTER — OFFICE VISIT (OUTPATIENT)
Dept: CARDIOLOGY CLINIC | Age: 81
End: 2021-01-07
Payer: MEDICARE

## 2021-01-07 VITALS
WEIGHT: 190 LBS | HEART RATE: 64 BPM | SYSTOLIC BLOOD PRESSURE: 122 MMHG | DIASTOLIC BLOOD PRESSURE: 70 MMHG | HEIGHT: 71 IN | RESPIRATION RATE: 16 BRPM | BODY MASS INDEX: 26.6 KG/M2 | OXYGEN SATURATION: 98 %

## 2021-01-07 DIAGNOSIS — R00.2 PALPITATIONS: ICD-10-CM

## 2021-01-07 DIAGNOSIS — I48.0 PAROXYSMAL ATRIAL FIBRILLATION (HCC): ICD-10-CM

## 2021-01-07 DIAGNOSIS — I10 ESSENTIAL HYPERTENSION: ICD-10-CM

## 2021-01-07 DIAGNOSIS — I49.1 PREMATURE ATRIAL CONTRACTIONS: Primary | ICD-10-CM

## 2021-01-07 PROCEDURE — 99215 OFFICE O/P EST HI 40 MIN: CPT | Performed by: NURSE PRACTITIONER

## 2021-01-07 NOTE — PROGRESS NOTES
Room # 2     Chest pain: no  Shortness of breath: no  Edema: no  Palpitations, Skipped beats, Rapid heartbeat: yes, once or twice daily  Dizziness: no    New diagnosis/Surgeries: no    ER/Hospitalizations: no    Refills:no

## 2021-01-07 NOTE — PROGRESS NOTES
HISTORY OF PRESENTING ILLNESS      María Gregg is a 68 y.o. male with history of hypertension, dyslipidemia, and hx of PACs/PVCs on previous EKGs. He underwent a 24-hour Holter monitor which demonstrated PACs which were nonconducted at times resulting in compensatory pauses. He is currently treated with metoprolol 50 mg and reports that his palpitations have resolved. He was seen last month without issue but reports complaints of palpitations today. These occur 2-3 times daily and are brief, also mild. He has not been as adequately hydrated. PAST MEDICAL HISTORY     Past Medical History:   Diagnosis Date    Arrhythmia     Arthritis     knees    Diverticulitis     High cholesterol     Hyperlipidemia     Hypertension     Irregular heart beat     Tinnitus     Tinnitus 03/27/2019           PAST SURGICAL HISTORY     Past Surgical History:   Procedure Laterality Date    COLONOSCOPY N/A 12/17/2018    COLONOSCOPY performed by Fer Torres MD at [facility]"" ENDOSCOPY          ALLERGIES     Allergies   Allergen Reactions    Losartan Other (comments)     Shaking, feeling unbalanced. FAMILY HISTORY     Family History   Problem Relation Age of Onset    Hypertension Mother    Best Stroke Father     Diabetes Sister     Hypertension Sister     negative for cardiac disease       SOCIAL HISTORY     Social History     Socioeconomic History    Marital status: SINGLE     Spouse name: Not on file    Number of children: Not on file    Years of education: Not on file    Highest education level: Not on file   Tobacco Use    Smoking status: Never Smoker    Smokeless tobacco: Never Used   Substance and Sexual Activity    Alcohol use:  Yes     Alcohol/week: 7.0 - 8.0 standard drinks     Types: 4 Shots of liquor, 3 - 4 Standard drinks or equivalent per week     Comment: socially    Drug use: Never    Sexual activity: Never   Social History Narrative    ** Merged History Encounter ** MEDICATIONS     Current Outpatient Medications   Medication Sig    amLODIPine (NORVASC) 10 mg tablet TAKE 1 TABLET BY MOUTH EVERY DAY    atorvastatin (LIPITOR) 10 mg tablet TAKE 1 TABLET BY MOUTH EVERY DAY    diclofenac EC (VOLTAREN) 50 mg EC tablet TAKE 1 TAB BY MOUTH TWO (2) TIMES DAILY AS NEEDED.  aspirin delayed-release 81 mg tablet Take 81 mg by mouth daily.  lisinopriL (PRINIVIL, ZESTRIL) 20 mg tablet TAKE 2 TABLETS DAILY (Patient taking differently: 20 mg two (2) times a day. TAKE 2 TABLETS DAILY)    famotidine (PEPCID) 20 mg tablet TAKE 1 TABLET BY MOUTH TWICE A DAY    metoprolol succinate (TOPROL-XL) 50 mg XL tablet TAKE 1 TABLET BY MOUTH EVERY DAY    busPIRone (BUSPAR) 5 mg tablet TAKE 1 TABLET BY MOUTH TWICE A DAY (Patient taking differently: as needed.)    ALPRAZolam (XANAX) 0.5 mg tablet Take 0.5 mg by mouth nightly as needed.  multivitamin, tx-iron-ca-min (THERA-M W/ IRON) 9 mg iron-400 mcg tab tablet Take 1 Tab by mouth daily. No current facility-administered medications for this visit. I have reviewed the nurses notes, vitals, problem list, allergy list, medical history, family, social history and medications. REVIEW OF SYMPTOMS      General: Pt denies excessive weight gain or loss. Pt is able to conduct ADL's  HEENT: Denies blurred vision, headaches, hearing loss, epistaxis and difficulty swallowing. Respiratory: Denies cough, congestion, shortness of breath, ALANIS, wheezing or stridor.   Cardiovascular: Denies precordial pain, palpitations, edema or PND  Gastrointestinal: Denies poor appetite, indigestion, abdominal pain or blood in stool  Genitourinary: Denies hematuria, dysuria, increased urinary frequency  Musculoskeletal: Denies joint pain or swelling from muscles or joints  Neurologic: Denies tremor, paresthesias, headache, or sensory motor disturbance  Psychiatric: Denies confusion, insomnia, depression  Integumentray: Denies rash, itching or ulcers. Hematologic: Denies easy bruising, bleeding       PHYSICAL EXAMINATION      Vitals: see vitals section  General: Well developed, in no acute distress. HEENT: No jaundice, oral mucosa moist, no oral ulcers  Neck: Supple, no stiffness, no lymphadenopathy, supple  Heart:  Normal S1/S2 negative S3 or S4. Regular, no murmur, gallop or rub, no jugular venous distention  Respiratory: Clear bilaterally x 4, no wheezing or rales  Abdomen:   Soft, non-tender, bowel sounds are active. Extremities:  No edema, normal cap refill, no cyanosis. Musculoskeletal: No clubbing, no deformities  Neuro: A&Ox3, speech clear, gait stable, cooperative, no focal neurologic deficits  Skin: Skin color is normal. No rashes or lesions. Non diaphoretic, moist.  Vascular: 2+ pulses symmetric in all extremities       DIAGNOSTIC DATA      EKG:        LABORATORY DATA      Lab Results   Component Value Date/Time    WBC 4.6 10/29/2020 09:31 AM    Hemoglobin (POC) 15.6 12/03/2017 10:40 AM    HGB 12.5 10/29/2020 09:31 AM    Hematocrit (POC) 46 12/03/2017 10:40 AM    HCT 39.4 10/29/2020 09:31 AM    PLATELET 004 77/42/8271 09:31 AM    MCV 99.5 (H) 10/29/2020 09:31 AM      Lab Results   Component Value Date/Time    Sodium 140 10/29/2020 09:31 AM    Potassium 4.6 10/29/2020 09:31 AM    Chloride 107 10/29/2020 09:31 AM    CO2 26 10/29/2020 09:31 AM    Anion gap 7 10/29/2020 09:31 AM    Glucose 98 10/29/2020 09:31 AM    BUN 18 10/29/2020 09:31 AM    Creatinine 1.24 10/29/2020 09:31 AM    BUN/Creatinine ratio 15 10/29/2020 09:31 AM    GFR est AA >60 10/29/2020 09:31 AM    GFR est non-AA 57 (L) 10/29/2020 09:31 AM    Calcium 9.7 10/29/2020 09:31 AM    Bilirubin, total 0.7 10/29/2020 09:31 AM    Alk. phosphatase 59 10/29/2020 09:31 AM    Protein, total 6.8 10/29/2020 09:31 AM    Albumin 3.9 10/29/2020 09:31 AM    Globulin 2.9 10/29/2020 09:31 AM    A-G Ratio 1.3 10/29/2020 09:31 AM    ALT (SGPT) 32 10/29/2020 09:31 AM           ASSESSMENT      1. Premature atrial contractions              A. Palpitations              B. Nonconducted at times with compensatory pause  2. Hypertension  3. Dyslipidemia  4. PVCs       PLAN     Continue current medical therapy, patient declined monitoring. Will work on increasing hydration. ICD-10-CM ICD-9-CM    1. Premature atrial contractions  I49.1 427.61    2. Paroxysmal atrial fibrillation (HCC)  I48.0 427.31    3. Essential hypertension  I10 401.9    4. Palpitations  R00.2 785.1      No orders of the defined types were placed in this encounter. FOLLOW-UP   1 year as planned    Thank you, Jordi Lacy MD for allowing me to participate in the care of this extraordinarily pleasant male. Please do not hesitate to contact me for further questions/concerns.      Nikki [place], NP    [facility] [address]  [address] [place] [address]  [phone] / [phone] Fax   [phone] / [phone] Fax

## 2021-01-17 DIAGNOSIS — I10 ESSENTIAL HYPERTENSION: ICD-10-CM

## 2021-01-17 RX ORDER — METOPROLOL SUCCINATE 50 MG/1
TABLET, EXTENDED RELEASE ORAL
Qty: 90 TAB | Refills: 1 | Status: SHIPPED | OUTPATIENT
Start: 2021-01-17 | End: 2021-06-27

## 2021-02-13 DIAGNOSIS — I10 ESSENTIAL HYPERTENSION: ICD-10-CM

## 2021-02-13 RX ORDER — AMLODIPINE BESYLATE 10 MG/1
TABLET ORAL
Qty: 90 TAB | Refills: 0 | Status: SHIPPED | OUTPATIENT
Start: 2021-02-13 | End: 2021-04-29

## 2021-03-19 RX ORDER — ATORVASTATIN CALCIUM 10 MG/1
TABLET, FILM COATED ORAL
Qty: 90 TAB | Refills: 1 | Status: SHIPPED | OUTPATIENT
Start: 2021-03-19 | End: 2021-08-02

## 2021-04-01 ENCOUNTER — OFFICE VISIT (OUTPATIENT)
Dept: CARDIOLOGY CLINIC | Age: 81
End: 2021-04-01
Payer: MEDICARE

## 2021-04-01 VITALS
SYSTOLIC BLOOD PRESSURE: 142 MMHG | HEIGHT: 71 IN | BODY MASS INDEX: 26.74 KG/M2 | HEART RATE: 68 BPM | DIASTOLIC BLOOD PRESSURE: 80 MMHG | WEIGHT: 191 LBS | OXYGEN SATURATION: 93 %

## 2021-04-01 DIAGNOSIS — R00.2 PALPITATIONS: ICD-10-CM

## 2021-04-01 DIAGNOSIS — I10 ESSENTIAL HYPERTENSION: Primary | ICD-10-CM

## 2021-04-01 DIAGNOSIS — I49.1 PAC (PREMATURE ATRIAL CONTRACTION): ICD-10-CM

## 2021-04-01 PROCEDURE — 99213 OFFICE O/P EST LOW 20 MIN: CPT | Performed by: INTERNAL MEDICINE

## 2021-04-01 PROCEDURE — 93000 ELECTROCARDIOGRAM COMPLETE: CPT | Performed by: INTERNAL MEDICINE

## 2021-04-01 NOTE — PROGRESS NOTES
Office Follow-up    NAME: Sharifa Cervantes   :  1940  MRM:  [id number]    Date:  2021            Assessment:     Problem List  Date Reviewed: 2020          Codes Class Noted    Chest pain ICD-10-CM: R07.9  ICD-9-CM: 786.50  2020        Colitis ICD-10-CM: K52.9  ICD-9-CM: 558.9  2020        Increased frequency of urination ICD-10-CM: R35.0  ICD-9-CM: 788.41  2020        Nonspecific ST-T wave electrocardiographic changes ICD-10-CM: R94.31  ICD-9-CM: 794.31  2020        Palpitations ICD-10-CM: R00.2  ICD-9-CM: 785.1  2020        Anxiety ICD-10-CM: F41.9  ICD-9-CM: 300.00  2018        Tinnitus of left ear ICD-10-CM: H93.12  ICD-9-CM: 388.30  2018        Essential hypertension ICD-10-CM: I10  ICD-9-CM: 401.9  2018        Pure hypercholesterolemia ICD-10-CM: E78.00  ICD-9-CM: 272.0  2018                 Plan:     1. Hypertension: BP mostly staying normal.  Watch sodium intake. Increase mobility. 2. Dyslipidemia: Continue statins. 3. Palpitations: He has known history of PACs. These are controlled with metoprolol. No new recommendations. Follow-up with me in 1 year. ATTENTION:   This medical record was transcribed using an electronic medical records/speech recognition system. Although proofread, it may and can contain electronic, spelling and other errors. Corrections may be executed at a later time. Please feel free to contact us for any clarifications as needed. Subjective:     Sharifa Cervantes, a 68y.o. year-old who presents for followup. He has prior history of hypertension, frequent PACs, compensated pauses, dyslipidemia. He is here for 1 year follow-up. Denies any symptoms of chest pain, shortness of breath, lightheadedness or dizziness. EKG today demonstrated normal sinus rhythm, first-degree AV block, normal axis, normal intervals, normal ST segment.       Exam:     Physical Exam:  Visit Vitals  BP (!) 142/80 (BP 1 Location: Right arm, BP Patient Position: Sitting)   Pulse 68   Ht 5' 11\" (1.803 m)   Wt 191 lb (86.6 kg)   SpO2 93%   BMI 26.64 kg/m²     General appearance - alert, well appearing, and in no distress  Mental status - affect appropriate to mood  Eyes - sclera anicteric, moist mucous membranes  Neck - supple, no significant adenopathy  Chest - clear to auscultation, no wheezes, rales or rhonchi  Heart - normal rate, regular rhythm, normal S1, S2, no murmurs, rubs, clicks or gallops  Abdomen - soft, nontender, nondistended, no masses or organomegaly  Extremities - peripheral pulses normal, no pedal edema  Skin - normal coloration  no rashes    Medications:     Current Outpatient Medications   Medication Sig    atorvastatin (LIPITOR) 10 mg tablet TAKE 1 TABLET BY MOUTH EVERY DAY    amLODIPine (NORVASC) 10 mg tablet TAKE 1 TABLET BY MOUTH EVERY DAY    metoprolol succinate (TOPROL-XL) 50 mg XL tablet TAKE 1 TABLET BY MOUTH EVERY DAY    diclofenac EC (VOLTAREN) 50 mg EC tablet TAKE 1 TAB BY MOUTH TWO (2) TIMES DAILY AS NEEDED.  aspirin delayed-release 81 mg tablet Take 81 mg by mouth daily.  lisinopriL (PRINIVIL, ZESTRIL) 20 mg tablet TAKE 2 TABLETS DAILY (Patient taking differently: 20 mg two (2) times a day. TAKE 2 TABLETS DAILY)    famotidine (PEPCID) 20 mg tablet TAKE 1 TABLET BY MOUTH TWICE A DAY (Patient taking differently: as needed.)    busPIRone (BUSPAR) 5 mg tablet TAKE 1 TABLET BY MOUTH TWICE A DAY (Patient taking differently: as needed.)    ALPRAZolam (XANAX) 0.5 mg tablet Take 0.5 mg by mouth nightly as needed.  multivitamin, tx-iron-ca-min (THERA-M W/ IRON) 9 mg iron-400 mcg tab tablet Take 1 Tab by mouth daily. No current facility-administered medications for this visit. Diagnostic Data Review:       No specialty comments available.       Lab Review:     Lab Results   Component Value Date/Time    Cholesterol, total 115 10/29/2020 09:31 AM    HDL Cholesterol 45 10/29/2020 09:31 AM    LDL, calculated 45.6 10/29/2020 09:31 AM    Triglyceride 122 10/29/2020 09:31 AM    CHOL/HDL Ratio 2.6 10/29/2020 09:31 AM     Lab Results   Component Value Date/Time    Creatinine (POC) 1.2 12/03/2017 10:40 AM    Creatinine 1.24 10/29/2020 09:31 AM     Lab Results   Component Value Date/Time    BUN 18 10/29/2020 09:31 AM    BUN (POC) 12 12/03/2017 10:40 AM     Lab Results   Component Value Date/Time    Potassium 4.6 10/29/2020 09:31 AM     Lab Results   Component Value Date/Time    Hemoglobin A1c 5.1 10/29/2020 09:31 AM    Hemoglobin A1c, External 5.0 06/06/2018     Lab Results   Component Value Date/Time    Hemoglobin (POC) 15.6 12/03/2017 10:40 AM    HGB 12.5 10/29/2020 09:31 AM     Lab Results   Component Value Date/Time    PLATELET 065 12/00/6631 09:31 AM     No results for input(s): CPK, CKMB, TROIQ in the last 72 hours. No lab exists for component: CKQMB, CPKMB             ___________________________________________________    Sophie Gaytan.  Je Pringle MD, [facility]

## 2021-04-01 NOTE — PROGRESS NOTES
Lauri Mayberry is a 68 y.o. male    Chief Complaint   Patient presents with    Hypertension    Palpitations    Follow-up     PAF, PAC       Chest pain No    SOB No    Dizziness No    Swelling No    Refills No    Visit Vitals  BP (!) 142/80 (BP 1 Location: Right arm, BP Patient Position: Sitting)   Pulse 68   Ht 5' 11\" (1.803 m)   Wt 191 lb (86.6 kg)   SpO2 93%   BMI 26.64 kg/m²     Vitals:    04/01/21 1009 04/01/21 1024   BP: (!) 148/74 (!) 142/80   BP 1 Location: Left upper arm Right arm   BP Patient Position: Sitting Sitting   Pulse: 68    SpO2: 93%    Weight: 191 lb (86.6 kg)    Height: 5' 11\" (1.803 m)        1. Have you been to the ER, urgent care clinic since your last visit? Hospitalized since your last visit? no    2. Have you seen or consulted any other health care providers outside of the [address] [place] [address] since your last visit? Include any pap smears or colon screening.   no

## 2021-04-17 ENCOUNTER — TRANSCRIBE ORDER (OUTPATIENT)
Dept: INTERNAL MEDICINE CLINIC | Age: 81
End: 2021-04-17

## 2021-04-21 ENCOUNTER — OFFICE VISIT (OUTPATIENT)
Dept: FAMILY MEDICINE CLINIC | Age: 81
End: 2021-04-21
Payer: MEDICARE

## 2021-04-21 VITALS
DIASTOLIC BLOOD PRESSURE: 64 MMHG | BODY MASS INDEX: 27.3 KG/M2 | TEMPERATURE: 98.8 F | OXYGEN SATURATION: 96 % | SYSTOLIC BLOOD PRESSURE: 124 MMHG | RESPIRATION RATE: 20 BRPM | WEIGHT: 195 LBS | HEIGHT: 71 IN | HEART RATE: 56 BPM

## 2021-04-21 DIAGNOSIS — R73.09 ELEVATED GLUCOSE: ICD-10-CM

## 2021-04-21 DIAGNOSIS — Z12.5 SCREENING FOR PROSTATE CANCER: ICD-10-CM

## 2021-04-21 DIAGNOSIS — I10 ESSENTIAL HYPERTENSION: Primary | ICD-10-CM

## 2021-04-21 DIAGNOSIS — E55.9 VITAMIN D DEFICIENCY: ICD-10-CM

## 2021-04-21 DIAGNOSIS — E78.00 PURE HYPERCHOLESTEROLEMIA: ICD-10-CM

## 2021-04-21 PROBLEM — M16.11 OSTEOARTHRITIS OF RIGHT HIP: Status: ACTIVE | Noted: 2021-04-21

## 2021-04-21 LAB
25(OH)D3 SERPL-MCNC: 33.8 NG/ML (ref 30–100)
ALBUMIN SERPL-MCNC: 3.8 G/DL (ref 3.5–5)
ALBUMIN/GLOB SERPL: 1.4 {RATIO} (ref 1.1–2.2)
ALP SERPL-CCNC: 70 U/L (ref 45–117)
ALT SERPL-CCNC: 36 U/L (ref 12–78)
ANION GAP SERPL CALC-SCNC: 7 MMOL/L (ref 5–15)
AST SERPL-CCNC: 24 U/L (ref 15–37)
BASOPHILS # BLD: 0 K/UL (ref 0–0.1)
BASOPHILS NFR BLD: 1 % (ref 0–1)
BILIRUB SERPL-MCNC: 0.6 MG/DL (ref 0.2–1)
BUN SERPL-MCNC: 16 MG/DL (ref 6–20)
BUN/CREAT SERPL: 14 (ref 12–20)
CALCIUM SERPL-MCNC: 9.3 MG/DL (ref 8.5–10.1)
CHLORIDE SERPL-SCNC: 111 MMOL/L (ref 97–108)
CHOLEST SERPL-MCNC: 111 MG/DL
CO2 SERPL-SCNC: 23 MMOL/L (ref 21–32)
CREAT SERPL-MCNC: 1.17 MG/DL (ref 0.7–1.3)
DIFFERENTIAL METHOD BLD: ABNORMAL
EOSINOPHIL # BLD: 0.3 K/UL (ref 0–0.4)
EOSINOPHIL NFR BLD: 7 % (ref 0–7)
ERYTHROCYTE [DISTWIDTH] IN BLOOD BY AUTOMATED COUNT: 12.2 % (ref 11.5–14.5)
EST. AVERAGE GLUCOSE BLD GHB EST-MCNC: 100 MG/DL
GLOBULIN SER CALC-MCNC: 2.8 G/DL (ref 2–4)
GLUCOSE SERPL-MCNC: 94 MG/DL (ref 65–100)
HBA1C MFR BLD: 5.1 % (ref 4–5.6)
HCT VFR BLD AUTO: 39 % (ref 36.6–50.3)
HDLC SERPL-MCNC: 45 MG/DL
HDLC SERPL: 2.5 {RATIO} (ref 0–5)
HGB BLD-MCNC: 12.5 G/DL (ref 12.1–17)
IMM GRANULOCYTES # BLD AUTO: 0 K/UL (ref 0–0.04)
IMM GRANULOCYTES NFR BLD AUTO: 1 % (ref 0–0.5)
LDLC SERPL CALC-MCNC: 42 MG/DL (ref 0–100)
LIPID PROFILE,FLP: NORMAL
LYMPHOCYTES # BLD: 1.3 K/UL (ref 0.8–3.5)
LYMPHOCYTES NFR BLD: 33 % (ref 12–49)
MCH RBC QN AUTO: 31.5 PG (ref 26–34)
MCHC RBC AUTO-ENTMCNC: 32.1 G/DL (ref 30–36.5)
MCV RBC AUTO: 98.2 FL (ref 80–99)
MONOCYTES # BLD: 0.5 K/UL (ref 0–1)
MONOCYTES NFR BLD: 12 % (ref 5–13)
NEUTS SEG # BLD: 1.8 K/UL (ref 1.8–8)
NEUTS SEG NFR BLD: 46 % (ref 32–75)
NRBC # BLD: 0 K/UL (ref 0–0.01)
NRBC BLD-RTO: 0 PER 100 WBC
PLATELET # BLD AUTO: 168 K/UL (ref 150–400)
PMV BLD AUTO: 11 FL (ref 8.9–12.9)
POTASSIUM SERPL-SCNC: 4.6 MMOL/L (ref 3.5–5.1)
PROT SERPL-MCNC: 6.6 G/DL (ref 6.4–8.2)
PSA SERPL-MCNC: 3.4 NG/ML (ref 0.01–4)
RBC # BLD AUTO: 3.97 M/UL (ref 4.1–5.7)
SODIUM SERPL-SCNC: 141 MMOL/L (ref 136–145)
TRIGL SERPL-MCNC: 120 MG/DL (ref ?–150)
VLDLC SERPL CALC-MCNC: 24 MG/DL
WBC # BLD AUTO: 3.8 K/UL (ref 4.1–11.1)

## 2021-04-21 PROCEDURE — 99213 OFFICE O/P EST LOW 20 MIN: CPT | Performed by: INTERNAL MEDICINE

## 2021-04-21 NOTE — PATIENT INSTRUCTIONS
Allergies: Care Instructions Your Care Instructions Allergies occur when your body's defense system (immune system) overreacts to certain substances. The immune system treats a harmless substance as if it were a harmful germ or virus. Many things can make this happen. These include pollens, medicine, food, dust, animal dander, and mold. Allergies can be mild or severe. Mild allergies can be managed with home treatment. But medicine may be needed to prevent problems. Managing your allergies is an important part of staying healthy. Your doctor may suggest that you have allergy testing to help find out what is causing your allergies. Severe allergies can cause reactions that affect your whole body (anaphylactic reactions). Your doctor may prescribe a shot of epinephrine to carry with you in case you have a severe reaction. Learn how to give yourself the shot and keep it with you at all times. Make sure it is not . Follow-up care is a key part of your treatment and safety. Be sure to make and go to all appointments, and call your doctor if you are having problems. It's also a good idea to know your test results and keep a list of the medicines you take. How can you care for yourself at home? · If you have been told by your doctor that dust or dust mites are causing your allergy, decrease the dust around your bed: 
? Wash sheets, pillowcases, and other bedding in hot water every week. ? Use dust-proof covers for pillows, duvets, and mattresses. Avoid plastic covers because they tear easily and do not \"breathe. \" Wash as instructed on the label. ? Do not use any blankets and pillows that you do not need. ? Use blankets that you can wash in your washing machine. ? Consider removing drapes and carpets, which attract and hold dust, from your bedroom. · If you are allergic to house dust and mites, do not use home humidifiers. Your doctor can suggest ways you can control dust and mites.  
· Look for signs of cockroaches. Cockroaches cause allergic reactions. Use cockroach baits to get rid of them. Then, clean your home well. Cockroaches like areas where grocery bags, newspapers, empty bottles, or cardboard boxes are stored. Do not keep these inside your home, and keep trash and food containers sealed. Seal off any spots where cockroaches might enter your home. · If you are allergic to mold, get rid of furniture, rugs, and drapes that smell musty. Check for mold in the bathroom. · If you are allergic to outdoor pollen or mold spores, use air-conditioning. Change or clean all filters every month. Keep windows closed. · If you are allergic to pollen, stay inside when pollen counts are high. Use a vacuum  with a HEPA filter or a double-thickness filter at least two times each week. · Stay inside when air pollution is bad. Avoid paint fumes, perfumes, and other strong odors. · Avoid conditions that make your allergies worse. Stay away from smoke. Do not smoke or let anyone else smoke in your house. Do not use fireplaces or wood-burning stoves. · If you are allergic to your pets, change the air filter in your furnace every month. Use high-efficiency filters. · If you are allergic to pet dander, keep pets outside or out of your bedroom. Old carpet and cloth furniture can hold a lot of animal dander. You may need to replace them. When should you call for help? Give an epinephrine shot if: 
  · You think you are having a severe allergic reaction.  
  · You have symptoms in more than one body area, such as mild nausea and an itchy mouth. After giving an epinephrine shot call 911, even if you feel better. Call 911 if: 
  · You have symptoms of a severe allergic reaction. These may include: 
? Sudden raised, red areas (hives) all over your body. ? Swelling of the throat, mouth, lips, or tongue. ? Trouble breathing. ? Passing out (losing consciousness).  Or you may feel very lightheaded or suddenly feel weak, confused, or restless.  
  · You have been given an epinephrine shot, even if you feel better. Call your doctor now or seek immediate medical care if: 
  · You have symptoms of an allergic reaction, such as: ? A rash or hives (raised, red areas on the skin). ? Itching. ? Swelling. ? Belly pain, nausea, or vomiting. Watch closely for changes in your health, and be sure to contact your doctor if: 
  · You do not get better as expected. Where can you learn more? Go to http://www.gray.com/ Enter J512 in the search box to learn more about \"Allergies: Care Instructions. \" Current as of: November 6, 2020               Content Version: 12.8 © 2006-2021 [employer]wise, [employer]. Care instructions adapted under license by [employer] (which disclaims liability or warranty for this information). If you have questions about a medical condition or this instruction, always ask your healthcare professional. [address] any warranty or liability for your use of this information.

## 2021-04-21 NOTE — PROGRESS NOTES
Identified pt with two pt identifiers(name and ). Chief Complaint   Patient presents with    Labs     fasting    Hypertension    Diabetes    Cholesterol Problem    Allergies     medication recomindation        Health Maintenance Due   Topic    Hepatitis C Screening     COVID-19 Vaccine (1)    DTaP/Tdap/Td series (1 - Tdap)    Shingrix Vaccine Age 50> (1 of 2)       Wt Readings from Last 3 Encounters:   21 195 lb (88.5 kg)   21 191 lb (86.6 kg)   21 190 lb (86.2 kg)     Temp Readings from Last 3 Encounters:   21 98.8 °F (37.1 °C) (Temporal)   10/28/20 99.4 °F (37.4 °C) (Oral)   20 98.5 °F (36.9 °C) (Oral)     BP Readings from Last 3 Encounters:   21 124/64   21 (!) 142/80   21 122/70     Pulse Readings from Last 3 Encounters:   21 (!) 56   21 68   21 64         Learning Assessment:  :     Learning Assessment 2018   PRIMARY LEARNER Patient   BARRIERS PRIMARY LEARNER VISUAL   CO-LEARNER CAREGIVER No   PRIMARY LANGUAGE ENGLISH   LEARNER PREFERENCE PRIMARY DEMONSTRATION     LISTENING     READING   ANSWERED BY patient   RELATIONSHIP SELF       Depression Screening:  :     3 most recent PHQ Screens 2020   Little interest or pleasure in doing things Not at all   Feeling down, depressed, irritable, or hopeless Not at all   Total Score PHQ 2 0       Fall Risk Assessment:  :     Fall Risk Assessment, last 12 mths 2020   Able to walk? Yes   Fall in past 12 months? -       Abuse Screening:  :     Abuse Screening Questionnaire 2020 3/21/2019 2018   Do you ever feel afraid of your partner? N N N   Are you in a relationship with someone who physically or mentally threatens you? N N N   Is it safe for you to go home?  Y Y Y       Coordination of Care Questionnaire:  :     1) Have you been to an emergency room, urgent care clinic since your last visit? no   Hospitalized since your last visit? no             2) Have you seen or consulted any other health care providers outside of [address] [place] [address] since your last visit? no  (Include any pap smears or colon screenings in this section.)    3) Do you have an Advance Directive on file? no  Are you interested in receiving information about Advance Directives? no    Reviewed record in preparation for visit and have obtained necessary documentation.

## 2021-04-29 DIAGNOSIS — I10 ESSENTIAL HYPERTENSION: ICD-10-CM

## 2021-04-29 RX ORDER — AMLODIPINE BESYLATE 10 MG/1
TABLET ORAL
Qty: 90 TAB | Refills: 0 | Status: SHIPPED | OUTPATIENT
Start: 2021-04-29 | End: 2021-06-27

## 2021-04-29 NOTE — TELEPHONE ENCOUNTER
----- Message from Meg Smiley sent at 4/29/2021 11:06 AM EDT -----  Regarding: Dr. Jessica Schwab / refill  Medication Refill    Caller (if not patient): pt      Relationship of caller (if not patient):pt      Best contact number(s):[phone]      Name of medication and dosage if known: \"Amlodipine 10mg\"      Is patient out of this medication (yes/no):yes t2o      Pharmacy name: [id number]    Pharmacy listed in chart? (yes/no): N/A    Pharmacy phone number: [phone]      Details to clarify the request: pt is almost out of medication and would like to pick it up from [facility] pharmacy at this location.        Meg Smiley

## 2021-04-29 NOTE — TELEPHONE ENCOUNTER
----- Message from Giovanna Mota sent at 4/29/2021 11:13 AM EDT -----  Regarding: Dr. Leana James / telephone  General Message/Vendor Calls    Caller's first and last name: Mr. Stacey Valle      Reason for call: wants to go over lab results from last week      Callback required yes/no and why: yes to discuss      Best contact number(s):[phone]      Details to clarify the request:na'      Giovanna Mota

## 2021-05-03 ENCOUNTER — TELEPHONE (OUTPATIENT)
Dept: FAMILY MEDICINE CLINIC | Age: 81
End: 2021-05-03

## 2021-05-03 NOTE — TELEPHONE ENCOUNTER
----- Message from Celia Crandall sent at 5/3/2021 12:51 PM EDT -----  Regarding: Dr. Virginia Aguilar: [phone]  General Message/Vendor Calls    Caller's first and last name: Pt.       Reason for call: Has called several times to get lab work results, has not heard back. Callback required yes/no and why: Yes, would like test results. Best contact number(s): [phone]      Details to clarify the request: N/a.       Celia Crandall

## 2021-05-03 NOTE — TELEPHONE ENCOUNTER
Spoke to pt and relayed lab results and let him know letter with results was in the mail to him. He understood.

## 2021-05-14 RX ORDER — LISINOPRIL 40 MG/1
TABLET ORAL
Qty: 90 TAB | Refills: 0 | Status: SHIPPED | OUTPATIENT
Start: 2021-05-14 | End: 2021-06-27

## 2021-05-26 DIAGNOSIS — I10 ESSENTIAL HYPERTENSION: ICD-10-CM

## 2021-05-26 RX ORDER — LISINOPRIL 20 MG/1
TABLET ORAL
Qty: 180 TABLET | Refills: 1 | Status: SHIPPED | OUTPATIENT
Start: 2021-05-26 | End: 2021-06-27 | Stop reason: SDUPTHER

## 2021-06-27 DIAGNOSIS — I10 ESSENTIAL HYPERTENSION: ICD-10-CM

## 2021-06-27 RX ORDER — METOPROLOL SUCCINATE 50 MG/1
TABLET, EXTENDED RELEASE ORAL
Qty: 90 TABLET | Refills: 0 | Status: SHIPPED | OUTPATIENT
Start: 2021-06-27 | End: 2021-07-26

## 2021-06-27 RX ORDER — LISINOPRIL 40 MG/1
TABLET ORAL
Qty: 90 TABLET | Refills: 0 | Status: SHIPPED | OUTPATIENT
Start: 2021-06-27 | End: 2021-08-02

## 2021-06-27 RX ORDER — AMLODIPINE BESYLATE 10 MG/1
TABLET ORAL
Qty: 90 TABLET | Refills: 0 | Status: SHIPPED | OUTPATIENT
Start: 2021-06-27 | End: 2021-07-26

## 2021-07-26 DIAGNOSIS — I10 ESSENTIAL HYPERTENSION: ICD-10-CM

## 2021-07-26 RX ORDER — METOPROLOL SUCCINATE 50 MG/1
TABLET, EXTENDED RELEASE ORAL
Qty: 90 TABLET | Refills: 0 | Status: SHIPPED | OUTPATIENT
Start: 2021-07-26

## 2021-07-26 RX ORDER — AMLODIPINE BESYLATE 10 MG/1
TABLET ORAL
Qty: 90 TABLET | Refills: 0 | Status: SHIPPED | OUTPATIENT
Start: 2021-07-26

## 2021-08-02 RX ORDER — ATORVASTATIN CALCIUM 10 MG/1
TABLET, FILM COATED ORAL
Qty: 90 TABLET | Refills: 1 | Status: SHIPPED | OUTPATIENT
Start: 2021-08-02

## 2021-08-02 RX ORDER — LISINOPRIL 40 MG/1
TABLET ORAL
Qty: 90 TABLET | Refills: 0 | Status: SHIPPED | OUTPATIENT
Start: 2021-08-02

## 2022-03-18 PROBLEM — M16.11 OSTEOARTHRITIS OF RIGHT HIP: Status: ACTIVE | Noted: 2021-04-21

## 2022-03-19 PROBLEM — R07.9 CHEST PAIN: Status: ACTIVE | Noted: 2020-09-08

## 2022-03-19 PROBLEM — F41.9 ANXIETY: Status: ACTIVE | Noted: 2018-09-05

## 2022-03-19 PROBLEM — H93.12 TINNITUS OF LEFT EAR: Status: ACTIVE | Noted: 2018-01-30

## 2022-03-19 PROBLEM — K52.9 COLITIS: Status: ACTIVE | Noted: 2020-09-08

## 2022-03-19 PROBLEM — R94.31 NONSPECIFIC ST-T WAVE ELECTROCARDIOGRAPHIC CHANGES: Status: ACTIVE | Noted: 2020-09-08

## 2022-03-19 PROBLEM — R00.2 PALPITATIONS: Status: ACTIVE | Noted: 2020-09-08

## 2022-03-19 PROBLEM — I10 ESSENTIAL HYPERTENSION: Status: ACTIVE | Noted: 2018-01-30

## 2022-03-20 PROBLEM — E78.00 PURE HYPERCHOLESTEROLEMIA: Status: ACTIVE | Noted: 2018-01-30

## 2022-03-20 PROBLEM — R35.0 INCREASED FREQUENCY OF URINATION: Status: ACTIVE | Noted: 2020-09-08

## 2023-10-29 NOTE — ED TRIAGE NOTES
Pt reports ringing in the ear and lower neck pain in the back that is tight.   The ringing has been intermittent over the past month and has worsened over the last two days No

## (undated) DEVICE — KENDALL RADIOLUCENT FOAM MONITORING ELECTRODE -RECTANGULAR SHAPE: Brand: KENDALL

## (undated) DEVICE — BAG BELONG PT PERS CLEAR HANDL

## (undated) DEVICE — KIT COLON W/ 1.1OZ LUB AND 2 END

## (undated) DEVICE — SOLIDIFIER MEDC 1200ML -- CONVERT TO 356117

## (undated) DEVICE — ADULT SPO2 SENSOR: Brand: NELLCOR

## (undated) DEVICE — SIMPLICITY FLUFF UNDERPAD 23X36, MODERATE: Brand: SIMPLICITY

## (undated) DEVICE — 3M™ CUROS™ DISINFECTING CAP FOR NEEDLELESS CONNECTORS 270/CARTON 20 CARTONS/CASE CFF1-270: Brand: CUROS™

## (undated) DEVICE — (D)CUP DENT 7.5OZ PLAS DSTY -- DISC BY MFR USE ITEM 341725

## (undated) DEVICE — CANN NASAL O2 CAPNOGRAPHY AD -- FILTERLINE

## (undated) DEVICE — 1200 GUARD II KIT W/5MM TUBE W/O VAC TUBE: Brand: GUARDIAN

## (undated) DEVICE — Device

## (undated) DEVICE — BITEBLOCK ENDOSCP 60FR MAXI WHT POLYETH STURDY W/ VELC WVN

## (undated) DEVICE — CATH IV AUTOGRD BC PNK 20GA 25 -- INSYTE

## (undated) DEVICE — SET GRAV CK VLV NEEDLESS ST 3 GANGED 4WAY STPCOCK HI FLO 10